# Patient Record
Sex: FEMALE | Race: WHITE | NOT HISPANIC OR LATINO | Employment: OTHER | URBAN - METROPOLITAN AREA
[De-identification: names, ages, dates, MRNs, and addresses within clinical notes are randomized per-mention and may not be internally consistent; named-entity substitution may affect disease eponyms.]

---

## 2019-06-19 ENCOUNTER — HOSPITAL ENCOUNTER (OUTPATIENT)
Facility: MEDICAL CENTER | Age: 84
End: 2019-06-19

## 2019-06-20 ENCOUNTER — HOSPITAL ENCOUNTER (INPATIENT)
Facility: MEDICAL CENTER | Age: 84
LOS: 29 days | DRG: 243 | End: 2019-07-19
Attending: FAMILY MEDICINE | Admitting: FAMILY MEDICINE
Payer: MEDICARE

## 2019-06-20 ENCOUNTER — HOSPITAL ENCOUNTER (OUTPATIENT)
Facility: MEDICAL CENTER | Age: 84
DRG: 243 | End: 2019-06-20
Admitting: FAMILY MEDICINE
Payer: MEDICARE

## 2019-06-20 ENCOUNTER — HOSPITAL ENCOUNTER (OUTPATIENT)
Dept: RADIOLOGY | Facility: MEDICAL CENTER | Age: 84
End: 2019-06-20

## 2019-06-20 VITALS — BODY MASS INDEX: 24.67 KG/M2 | HEIGHT: 62 IN | WEIGHT: 134.04 LBS

## 2019-06-20 DIAGNOSIS — R53.1 GENERALIZED WEAKNESS: ICD-10-CM

## 2019-06-20 DIAGNOSIS — Z95.0 S/P PLACEMENT OF CARDIAC PACEMAKER: ICD-10-CM

## 2019-06-20 DIAGNOSIS — R55 SYNCOPE, UNSPECIFIED SYNCOPE TYPE: ICD-10-CM

## 2019-06-20 PROBLEM — I44.30 AV BLOCK: Status: ACTIVE | Noted: 2019-06-20

## 2019-06-20 PROBLEM — E03.9 HYPOTHYROIDISM: Status: ACTIVE | Noted: 2019-06-20

## 2019-06-20 PROBLEM — N18.30 STAGE 3 CHRONIC KIDNEY DISEASE: Status: ACTIVE | Noted: 2019-06-20

## 2019-06-20 LAB
ALBUMIN SERPL BCP-MCNC: 3.4 G/DL (ref 3.2–4.9)
ALBUMIN/GLOB SERPL: 1.3 G/DL
ALP SERPL-CCNC: 70 U/L (ref 30–99)
ALT SERPL-CCNC: 15 U/L (ref 2–50)
ANION GAP SERPL CALC-SCNC: 9 MMOL/L (ref 0–11.9)
AST SERPL-CCNC: 20 U/L (ref 12–45)
BASOPHILS # BLD AUTO: 1.3 % (ref 0–1.8)
BASOPHILS # BLD: 0.08 K/UL (ref 0–0.12)
BILIRUB SERPL-MCNC: 0.6 MG/DL (ref 0.1–1.5)
BUN SERPL-MCNC: 18 MG/DL (ref 8–22)
CALCIUM SERPL-MCNC: 9.1 MG/DL (ref 8.5–10.5)
CHLORIDE SERPL-SCNC: 103 MMOL/L (ref 96–112)
CO2 SERPL-SCNC: 26 MMOL/L (ref 20–33)
CREAT SERPL-MCNC: 1.46 MG/DL (ref 0.5–1.4)
EOSINOPHIL # BLD AUTO: 0.06 K/UL (ref 0–0.51)
EOSINOPHIL NFR BLD: 1 % (ref 0–6.9)
ERYTHROCYTE [DISTWIDTH] IN BLOOD BY AUTOMATED COUNT: 45 FL (ref 35.9–50)
GLOBULIN SER CALC-MCNC: 2.7 G/DL (ref 1.9–3.5)
GLUCOSE SERPL-MCNC: 88 MG/DL (ref 65–99)
HCT VFR BLD AUTO: 39.6 % (ref 37–47)
HGB BLD-MCNC: 13.1 G/DL (ref 12–16)
IMM GRANULOCYTES # BLD AUTO: 0.02 K/UL (ref 0–0.11)
IMM GRANULOCYTES NFR BLD AUTO: 0.3 % (ref 0–0.9)
INR PPP: 1.01 (ref 0.87–1.13)
LYMPHOCYTES # BLD AUTO: 1.19 K/UL (ref 1–4.8)
LYMPHOCYTES NFR BLD: 19.2 % (ref 22–41)
MAGNESIUM SERPL-MCNC: 2 MG/DL (ref 1.5–2.5)
MCH RBC QN AUTO: 30.5 PG (ref 27–33)
MCHC RBC AUTO-ENTMCNC: 33.1 G/DL (ref 33.6–35)
MCV RBC AUTO: 92.1 FL (ref 81.4–97.8)
MONOCYTES # BLD AUTO: 0.83 K/UL (ref 0–0.85)
MONOCYTES NFR BLD AUTO: 13.4 % (ref 0–13.4)
NEUTROPHILS # BLD AUTO: 4.03 K/UL (ref 2–7.15)
NEUTROPHILS NFR BLD: 64.8 % (ref 44–72)
NRBC # BLD AUTO: 0 K/UL
NRBC BLD-RTO: 0 /100 WBC
PLATELET # BLD AUTO: 267 K/UL (ref 164–446)
PMV BLD AUTO: 9.2 FL (ref 9–12.9)
POTASSIUM SERPL-SCNC: 4 MMOL/L (ref 3.6–5.5)
PROT SERPL-MCNC: 6.1 G/DL (ref 6–8.2)
PROTHROMBIN TIME: 13.5 SEC (ref 12–14.6)
RBC # BLD AUTO: 4.3 M/UL (ref 4.2–5.4)
SODIUM SERPL-SCNC: 138 MMOL/L (ref 135–145)
TSH SERPL DL<=0.005 MIU/L-ACNC: 0.13 UIU/ML (ref 0.38–5.33)
WBC # BLD AUTO: 6.2 K/UL (ref 4.8–10.8)

## 2019-06-20 PROCEDURE — 85610 PROTHROMBIN TIME: CPT

## 2019-06-20 PROCEDURE — 85025 COMPLETE CBC W/AUTO DIFF WBC: CPT

## 2019-06-20 PROCEDURE — 80053 COMPREHEN METABOLIC PANEL: CPT

## 2019-06-20 PROCEDURE — 36415 COLL VENOUS BLD VENIPUNCTURE: CPT

## 2019-06-20 PROCEDURE — 99223 1ST HOSP IP/OBS HIGH 75: CPT | Mod: AI | Performed by: INTERNAL MEDICINE

## 2019-06-20 PROCEDURE — 84443 ASSAY THYROID STIM HORMONE: CPT

## 2019-06-20 PROCEDURE — 93005 ELECTROCARDIOGRAM TRACING: CPT | Performed by: NURSE PRACTITIONER

## 2019-06-20 PROCEDURE — 700105 HCHG RX REV CODE 258: Performed by: INTERNAL MEDICINE

## 2019-06-20 PROCEDURE — 83735 ASSAY OF MAGNESIUM: CPT

## 2019-06-20 PROCEDURE — 770020 HCHG ROOM/CARE - TELE (206)

## 2019-06-20 PROCEDURE — 99221 1ST HOSP IP/OBS SF/LOW 40: CPT | Performed by: INTERNAL MEDICINE

## 2019-06-20 RX ORDER — CITALOPRAM 20 MG/1
20 TABLET ORAL DAILY
Status: DISCONTINUED | OUTPATIENT
Start: 2019-06-21 | End: 2019-07-19 | Stop reason: HOSPADM

## 2019-06-20 RX ORDER — HEPARIN SODIUM 5000 [USP'U]/ML
5000 INJECTION, SOLUTION INTRAVENOUS; SUBCUTANEOUS EVERY 8 HOURS
Status: DISCONTINUED | OUTPATIENT
Start: 2019-06-21 | End: 2019-07-19 | Stop reason: HOSPADM

## 2019-06-20 RX ORDER — AMOXICILLIN 250 MG
2 CAPSULE ORAL 2 TIMES DAILY
Status: DISCONTINUED | OUTPATIENT
Start: 2019-06-20 | End: 2019-07-19 | Stop reason: HOSPADM

## 2019-06-20 RX ORDER — SODIUM CHLORIDE 9 MG/ML
1000 INJECTION, SOLUTION INTRAVENOUS CONTINUOUS
Status: DISCONTINUED | OUTPATIENT
Start: 2019-06-20 | End: 2019-06-24

## 2019-06-20 RX ORDER — POLYETHYLENE GLYCOL 3350 17 G/17G
1 POWDER, FOR SOLUTION ORAL
Status: DISCONTINUED | OUTPATIENT
Start: 2019-06-20 | End: 2019-07-19 | Stop reason: HOSPADM

## 2019-06-20 RX ORDER — ASPIRIN 81 MG/1
81 TABLET, CHEWABLE ORAL DAILY
Status: DISCONTINUED | OUTPATIENT
Start: 2019-06-21 | End: 2019-07-19 | Stop reason: HOSPADM

## 2019-06-20 RX ORDER — RISPERIDONE 0.5 MG/1
0.25 TABLET ORAL 2 TIMES DAILY
Status: DISCONTINUED | OUTPATIENT
Start: 2019-06-20 | End: 2019-06-20

## 2019-06-20 RX ORDER — TRAZODONE HYDROCHLORIDE 50 MG/1
50 TABLET ORAL NIGHTLY
Status: ON HOLD | COMMUNITY
End: 2019-07-19

## 2019-06-20 RX ORDER — RISPERIDONE 0.5 MG/1
0.25 TABLET ORAL EVERY EVENING
Status: DISCONTINUED | OUTPATIENT
Start: 2019-06-21 | End: 2019-06-23

## 2019-06-20 RX ORDER — ACETAMINOPHEN 325 MG/1
650 TABLET ORAL EVERY 6 HOURS PRN
Status: DISCONTINUED | OUTPATIENT
Start: 2019-06-20 | End: 2019-07-19 | Stop reason: HOSPADM

## 2019-06-20 RX ORDER — LEVOTHYROXINE SODIUM 175 UG/1
175 TABLET ORAL
Status: ON HOLD | COMMUNITY
End: 2019-07-19

## 2019-06-20 RX ORDER — CITALOPRAM 20 MG/1
20 TABLET ORAL DAILY
COMMUNITY
End: 2019-07-30

## 2019-06-20 RX ORDER — BISACODYL 10 MG
10 SUPPOSITORY, RECTAL RECTAL
Status: DISCONTINUED | OUTPATIENT
Start: 2019-06-20 | End: 2019-07-19 | Stop reason: HOSPADM

## 2019-06-20 RX ORDER — TRAZODONE HYDROCHLORIDE 50 MG/1
50 TABLET ORAL NIGHTLY
Status: DISCONTINUED | OUTPATIENT
Start: 2019-06-20 | End: 2019-06-20

## 2019-06-20 RX ORDER — TRAZODONE HYDROCHLORIDE 50 MG/1
50 TABLET ORAL NIGHTLY PRN
Status: DISCONTINUED | OUTPATIENT
Start: 2019-06-20 | End: 2019-07-19 | Stop reason: HOSPADM

## 2019-06-20 RX ORDER — ASPIRIN 81 MG/1
81 TABLET, CHEWABLE ORAL DAILY
COMMUNITY

## 2019-06-20 RX ORDER — RISPERIDONE 0.5 MG/1
0.25 TABLET ORAL DAILY
Status: ON HOLD | COMMUNITY
End: 2019-07-19

## 2019-06-20 RX ADMIN — SODIUM CHLORIDE 1000 ML: 9 INJECTION, SOLUTION INTRAVENOUS at 23:46

## 2019-06-20 ASSESSMENT — COPD QUESTIONNAIRES
HAVE YOU SMOKED AT LEAST 100 CIGARETTES IN YOUR ENTIRE LIFE: YES
COPD SCREENING SCORE: 7
DO YOU EVER COUGH UP ANY MUCUS OR PHLEGM?: YES, A FEW DAYS A WEEK OR MONTH
IN THE PAST 12 MONTHS DO YOU DO LESS THAN YOU USED TO BECAUSE OF YOUR BREATHING PROBLEMS: AGREE
DURING THE PAST 4 WEEKS HOW MUCH DID YOU FEEL SHORT OF BREATH: SOME OF THE TIME

## 2019-06-20 ASSESSMENT — COGNITIVE AND FUNCTIONAL STATUS - GENERAL
STANDING UP FROM CHAIR USING ARMS: A LITTLE
MOBILITY SCORE: 17
SUGGESTED CMS G CODE MODIFIER DAILY ACTIVITY: CK
DRESSING REGULAR LOWER BODY CLOTHING: A LITTLE
HELP NEEDED FOR BATHING: A LITTLE
MOVING TO AND FROM BED TO CHAIR: A LITTLE
DAILY ACTIVITIY SCORE: 18
PERSONAL GROOMING: A LITTLE
TOILETING: A LITTLE
EATING MEALS: A LITTLE
DRESSING REGULAR UPPER BODY CLOTHING: A LITTLE
WALKING IN HOSPITAL ROOM: A LITTLE
CLIMB 3 TO 5 STEPS WITH RAILING: A LITTLE
TURNING FROM BACK TO SIDE WHILE IN FLAT BAD: A LITTLE
MOVING FROM LYING ON BACK TO SITTING ON SIDE OF FLAT BED: A LOT
SUGGESTED CMS G CODE MODIFIER MOBILITY: CK

## 2019-06-20 ASSESSMENT — LIFESTYLE VARIABLES
ALCOHOL_USE: YES
ON A TYPICAL DAY WHEN YOU DRINK ALCOHOL HOW MANY DRINKS DO YOU HAVE: 0
AVERAGE NUMBER OF DAYS PER WEEK YOU HAVE A DRINK CONTAINING ALCOHOL: 0
HOW MANY TIMES IN THE PAST YEAR HAVE YOU HAD 5 OR MORE DRINKS IN A DAY: 0
EVER_SMOKED: YES
EVER FELT BAD OR GUILTY ABOUT YOUR DRINKING: NO
TOTAL SCORE: 0
TOTAL SCORE: 0
HAVE PEOPLE ANNOYED YOU BY CRITICIZING YOUR DRINKING: NO
TOTAL SCORE: 0
HAVE YOU EVER FELT YOU SHOULD CUT DOWN ON YOUR DRINKING: NO
EVER HAD A DRINK FIRST THING IN THE MORNING TO STEADY YOUR NERVES TO GET RID OF A HANGOVER: NO
CONSUMPTION TOTAL: NEGATIVE

## 2019-06-20 ASSESSMENT — PATIENT HEALTH QUESTIONNAIRE - PHQ9
2. FEELING DOWN, DEPRESSED, IRRITABLE, OR HOPELESS: NOT AT ALL
SUM OF ALL RESPONSES TO PHQ9 QUESTIONS 1 AND 2: 0
1. LITTLE INTEREST OR PLEASURE IN DOING THINGS: NOT AT ALL

## 2019-06-20 NOTE — CONSULTS
DATE OF SERVICE:  06/20/2019    CHIEF COMPLAINT:  Syncope.    HISTORY OF PRESENT ILLNESS:  The patient is a very pleasant 89-year-old female   seen in consultation at the request of Dr. Scott, hospitalist at [facility] ([facility]  The patient lives in extended care facility and yesterday   was found on the floor.  She had a syncopal event and suffered a small scalp   laceration, which was sutured in the ER.  On the monitor, she has had   persistent 2:1 heart block with effective ventricular rate of 40 beats per   minute.  The QRS is narrow.  It is not clear if this is Mobitz 1 or Mobitz II   based on the tracings.    The patient is extremely hard of hearing.  History is obtained primarily from   her granddaughter who is by her side.  The patient has never had a stroke.    She has chronic kidney disease.  No history of heart attack or chest pain.  No   history of breathing difficulties.  She has had periodic edema.  The   granddaughter states her quality of life is quite good.  They would like to   pursue placing a pacemaker.  This was also discussed by phone between the   granddaughter and the patient's son, who is the patient's power of .    The patient is a DNR and this was discussed with family and reiterated.    ALLERGIES:  SULFONAMIDES.    ILLNESSES:  History of hypothyroidism, history of chronic kidney disease.    FAMILY HISTORY:  Not obtainable due to her deafness.  Interestingly,   granddaughter has a history of alpha 1 antitrypsin deficiency.    SOCIAL HISTORY:  Patient is a nonsmoker and lives in an extended care   facility.    MEDICATIONS ON ADMISSION:  Trazodone; Aldactone daily; Risperdal, dose   unknown; Synthroid, dose unknown; furosemide, dose unknown; Advair inhaler;   amlodipine, and aspirin.    REVIEW OF SYSTEMS:  Not obtainable because of her deafness.    PHYSICAL EXAMINATION:  GENERAL:  Reveals a pleasant and alert nonagenarian.  VITAL SIGNS:  On the monitor, she is in sinus rhythm  at 80 with 2:1 heart   block.  HEENT:  Pupils are equal, gaze conjugate, sclerae nonicteric.  NECK:  There is no JVD.  I do not hear any carotid bruit.  LUNGS:  Clear to auscultation.  BACK:  Without significant deformity.  HEART:  Regular rate and rhythm, with slow rate and I/VI systolic murmur.  ABDOMEN:  Mildly protuberant and soft to palpation.  No masses palpable.  EXTREMITIES:  There is trace edema.  Posterior tibial pulses are not palpable.  SKIN:  Warm and dry.    PERTINENT LABORATORY:  Hemoglobin is 11.8, hematocrit is 36%, and platelet   count is 248,000.  BUN and creatinine are 17 and 1.4, respectively.  GFR is   32.  Calcium is 8.3.  Potassium is 4.1.  TSH is 0.14, free T4 is 2.06.    IMPRESSION:  1. Syncope.  The patient had episode of syncope and has had persistent 2:1   heart block with an effective ventricular response rate of 40 beats per   minute.  This may be a Mobitz type 1, but difficult to tell because of a 2:1   conduction.  In any event, she has symptomatic bradycardia and has a class 1   indication for a permanent pacemaker.  The situation was discussed with the   granddaughter who, as noted above previously, discussed the situation with her   uncle (the patient's son), who has power of  and he concurs that the   pacemaker should be placed.  She will therefore be transferred to [facility] today   for placement of permanent pacemaker.  2. History of chronic kidney disease, followed by Dr. Capps in [place].  3. History of hypothyroidism, which may be overtreated based on lab.  4. Severe deafness.         ____________________________________     ANA TORRES MD    RPS / NTS    DD:  06/20/2019 09:43:17  DT:  06/20/2019 10:43:05    D#:  [id number]  Job#:  [id number]

## 2019-06-20 NOTE — PROGRESS NOTES
Transfer from [place], admitted for syncope, found to have second-degree AV block, seen by cardiology - Judy, recommended transfer for pacemaker placement, cardiology - Brien aware of transfer.    written material

## 2019-06-20 NOTE — PROGRESS NOTES
Direct admit from [facility], Dr. Scott, [phone].  Accepted by Dr. Champion for Type 2 Second Degree AVB.  Dr. Robles of Cardiology to consult.  Pt coming by ground EMS.

## 2019-06-20 NOTE — PROGRESS NOTES
Medical records received from [facility]:  H&P, ER report, Labs, EKG, Radiology reports, and Demographics.  Scanned into Media tab.

## 2019-06-21 ENCOUNTER — ANESTHESIA (OUTPATIENT)
Dept: CARDIOLOGY | Facility: MEDICAL CENTER | Age: 84
DRG: 243 | End: 2019-06-21
Payer: MEDICARE

## 2019-06-21 ENCOUNTER — APPOINTMENT (OUTPATIENT)
Dept: CARDIOLOGY | Facility: MEDICAL CENTER | Age: 84
DRG: 243 | End: 2019-06-21
Attending: NURSE PRACTITIONER
Payer: MEDICARE

## 2019-06-21 ENCOUNTER — ANESTHESIA EVENT (OUTPATIENT)
Dept: CARDIOLOGY | Facility: MEDICAL CENTER | Age: 84
DRG: 243 | End: 2019-06-21
Payer: MEDICARE

## 2019-06-21 ENCOUNTER — APPOINTMENT (OUTPATIENT)
Dept: RADIOLOGY | Facility: MEDICAL CENTER | Age: 84
DRG: 243 | End: 2019-06-21
Attending: INTERNAL MEDICINE
Payer: MEDICARE

## 2019-06-21 PROBLEM — D64.9 NORMOCYTIC ANEMIA: Status: ACTIVE | Noted: 2019-06-21

## 2019-06-21 LAB
ALBUMIN SERPL BCP-MCNC: 2.9 G/DL (ref 3.2–4.9)
ALBUMIN/GLOB SERPL: 1.3 G/DL
ALP SERPL-CCNC: 59 U/L (ref 30–99)
ALT SERPL-CCNC: 13 U/L (ref 2–50)
ANION GAP SERPL CALC-SCNC: 6 MMOL/L (ref 0–11.9)
AST SERPL-CCNC: 17 U/L (ref 12–45)
BASOPHILS # BLD AUTO: 1.1 % (ref 0–1.8)
BASOPHILS # BLD: 0.07 K/UL (ref 0–0.12)
BILIRUB SERPL-MCNC: 0.5 MG/DL (ref 0.1–1.5)
BUN SERPL-MCNC: 17 MG/DL (ref 8–22)
CALCIUM SERPL-MCNC: 8.5 MG/DL (ref 8.5–10.5)
CHLORIDE SERPL-SCNC: 105 MMOL/L (ref 96–112)
CO2 SERPL-SCNC: 27 MMOL/L (ref 20–33)
CREAT SERPL-MCNC: 1.23 MG/DL (ref 0.5–1.4)
EKG IMPRESSION: NORMAL
EKG IMPRESSION: NORMAL
EOSINOPHIL # BLD AUTO: 0.05 K/UL (ref 0–0.51)
EOSINOPHIL NFR BLD: 0.8 % (ref 0–6.9)
ERYTHROCYTE [DISTWIDTH] IN BLOOD BY AUTOMATED COUNT: 45.3 FL (ref 35.9–50)
GLOBULIN SER CALC-MCNC: 2.2 G/DL (ref 1.9–3.5)
GLUCOSE SERPL-MCNC: 86 MG/DL (ref 65–99)
HCT VFR BLD AUTO: 36.8 % (ref 37–47)
HGB BLD-MCNC: 11.9 G/DL (ref 12–16)
IMM GRANULOCYTES # BLD AUTO: 0.03 K/UL (ref 0–0.11)
IMM GRANULOCYTES NFR BLD AUTO: 0.5 % (ref 0–0.9)
LV EJECT FRACT  99904: 65
LV EJECT FRACT MOD 2C 99903: 73.62
LV EJECT FRACT MOD 4C 99902: 74.07
LV EJECT FRACT MOD BP 99901: 73.68
LYMPHOCYTES # BLD AUTO: 1.1 K/UL (ref 1–4.8)
LYMPHOCYTES NFR BLD: 17.1 % (ref 22–41)
MAGNESIUM SERPL-MCNC: 2.2 MG/DL (ref 1.5–2.5)
MCH RBC QN AUTO: 30.4 PG (ref 27–33)
MCHC RBC AUTO-ENTMCNC: 32.3 G/DL (ref 33.6–35)
MCV RBC AUTO: 93.9 FL (ref 81.4–97.8)
MONOCYTES # BLD AUTO: 0.77 K/UL (ref 0–0.85)
MONOCYTES NFR BLD AUTO: 12 % (ref 0–13.4)
NEUTROPHILS # BLD AUTO: 4.4 K/UL (ref 2–7.15)
NEUTROPHILS NFR BLD: 68.5 % (ref 44–72)
NRBC # BLD AUTO: 0 K/UL
NRBC BLD-RTO: 0 /100 WBC
PLATELET # BLD AUTO: 252 K/UL (ref 164–446)
PMV BLD AUTO: 9.4 FL (ref 9–12.9)
POTASSIUM SERPL-SCNC: 3.9 MMOL/L (ref 3.6–5.5)
PROT SERPL-MCNC: 5.1 G/DL (ref 6–8.2)
RBC # BLD AUTO: 3.92 M/UL (ref 4.2–5.4)
SODIUM SERPL-SCNC: 138 MMOL/L (ref 135–145)
T4 FREE SERPL-MCNC: 2.1 NG/DL (ref 0.53–1.43)
WBC # BLD AUTO: 6.4 K/UL (ref 4.8–10.8)

## 2019-06-21 PROCEDURE — 770020 HCHG ROOM/CARE - TELE (206)

## 2019-06-21 PROCEDURE — 93306 TTE W/DOPPLER COMPLETE: CPT | Mod: 26 | Performed by: INTERNAL MEDICINE

## 2019-06-21 PROCEDURE — 93005 ELECTROCARDIOGRAM TRACING: CPT | Performed by: INTERNAL MEDICINE

## 2019-06-21 PROCEDURE — 700101 HCHG RX REV CODE 250: Performed by: ANESTHESIOLOGY

## 2019-06-21 PROCEDURE — 0JH604Z INSERTION OF PACEMAKER, SINGLE CHAMBER INTO CHEST SUBCUTANEOUS TISSUE AND FASCIA, OPEN APPROACH: ICD-10-PCS | Performed by: INTERNAL MEDICINE

## 2019-06-21 PROCEDURE — 160002 HCHG RECOVERY MINUTES (STAT)

## 2019-06-21 PROCEDURE — 84439 ASSAY OF FREE THYROXINE: CPT

## 2019-06-21 PROCEDURE — 93010 ELECTROCARDIOGRAM REPORT: CPT | Mod: 77 | Performed by: INTERNAL MEDICINE

## 2019-06-21 PROCEDURE — 71045 X-RAY EXAM CHEST 1 VIEW: CPT

## 2019-06-21 PROCEDURE — 85025 COMPLETE CBC W/AUTO DIFF WBC: CPT

## 2019-06-21 PROCEDURE — 36415 COLL VENOUS BLD VENIPUNCTURE: CPT

## 2019-06-21 PROCEDURE — 93010 ELECTROCARDIOGRAM REPORT: CPT | Performed by: INTERNAL MEDICINE

## 2019-06-21 PROCEDURE — 700111 HCHG RX REV CODE 636 W/ 250 OVERRIDE (IP): Performed by: ANESTHESIOLOGY

## 2019-06-21 PROCEDURE — 02HK3JZ INSERTION OF PACEMAKER LEAD INTO RIGHT VENTRICLE, PERCUTANEOUS APPROACH: ICD-10-PCS | Performed by: INTERNAL MEDICINE

## 2019-06-21 PROCEDURE — 700102 HCHG RX REV CODE 250 W/ 637 OVERRIDE(OP): Performed by: INTERNAL MEDICINE

## 2019-06-21 PROCEDURE — B214YZZ FLUOROSCOPY OF RIGHT HEART USING OTHER CONTRAST: ICD-10-PCS | Performed by: INTERNAL MEDICINE

## 2019-06-21 PROCEDURE — 700105 HCHG RX REV CODE 258: Performed by: ANESTHESIOLOGY

## 2019-06-21 PROCEDURE — 305387 CL-PERMANENT PACEMAKER INSERTION

## 2019-06-21 PROCEDURE — 700111 HCHG RX REV CODE 636 W/ 250 OVERRIDE (IP): Performed by: INTERNAL MEDICINE

## 2019-06-21 PROCEDURE — 33207 INSERT HEART PM VENTRICULAR: CPT | Mod: KX | Performed by: INTERNAL MEDICINE

## 2019-06-21 PROCEDURE — 99233 SBSQ HOSP IP/OBS HIGH 50: CPT | Performed by: INTERNAL MEDICINE

## 2019-06-21 PROCEDURE — 83735 ASSAY OF MAGNESIUM: CPT

## 2019-06-21 PROCEDURE — 80053 COMPREHEN METABOLIC PANEL: CPT

## 2019-06-21 PROCEDURE — A9270 NON-COVERED ITEM OR SERVICE: HCPCS | Performed by: INTERNAL MEDICINE

## 2019-06-21 PROCEDURE — 93306 TTE W/DOPPLER COMPLETE: CPT

## 2019-06-21 PROCEDURE — 99232 SBSQ HOSP IP/OBS MODERATE 35: CPT | Performed by: INTERNAL MEDICINE

## 2019-06-21 RX ORDER — HALOPERIDOL 5 MG/ML
1 INJECTION INTRAMUSCULAR
Status: DISCONTINUED | OUTPATIENT
Start: 2019-06-21 | End: 2019-06-21 | Stop reason: HOSPADM

## 2019-06-21 RX ORDER — LEVOTHYROXINE SODIUM 0.07 MG/1
150 TABLET ORAL
Status: DISCONTINUED | OUTPATIENT
Start: 2019-06-22 | End: 2019-07-19 | Stop reason: HOSPADM

## 2019-06-21 RX ORDER — LIDOCAINE HYDROCHLORIDE 20 MG/ML
INJECTION, SOLUTION INFILTRATION; PERINEURAL
Status: DISPENSED
Start: 2019-06-21 | End: 2019-06-22

## 2019-06-21 RX ORDER — DIPHENHYDRAMINE HYDROCHLORIDE 50 MG/ML
12.5 INJECTION INTRAMUSCULAR; INTRAVENOUS
Status: DISCONTINUED | OUTPATIENT
Start: 2019-06-21 | End: 2019-06-21 | Stop reason: HOSPADM

## 2019-06-21 RX ORDER — SODIUM CHLORIDE, SODIUM LACTATE, POTASSIUM CHLORIDE, CALCIUM CHLORIDE 600; 310; 30; 20 MG/100ML; MG/100ML; MG/100ML; MG/100ML
INJECTION, SOLUTION INTRAVENOUS CONTINUOUS
Status: DISCONTINUED | OUTPATIENT
Start: 2019-06-21 | End: 2019-06-24

## 2019-06-21 RX ORDER — LABETALOL HYDROCHLORIDE 5 MG/ML
5 INJECTION, SOLUTION INTRAVENOUS
Status: DISCONTINUED | OUTPATIENT
Start: 2019-06-21 | End: 2019-06-21 | Stop reason: HOSPADM

## 2019-06-21 RX ORDER — SODIUM CHLORIDE, SODIUM LACTATE, POTASSIUM CHLORIDE, CALCIUM CHLORIDE 600; 310; 30; 20 MG/100ML; MG/100ML; MG/100ML; MG/100ML
INJECTION, SOLUTION INTRAVENOUS
Status: DISCONTINUED | OUTPATIENT
Start: 2019-06-21 | End: 2019-06-21 | Stop reason: SURG

## 2019-06-21 RX ORDER — VANCOMYCIN HYDROCHLORIDE 1 G/20ML
INJECTION, POWDER, LYOPHILIZED, FOR SOLUTION INTRAVENOUS
Status: DISPENSED
Start: 2019-06-21 | End: 2019-06-22

## 2019-06-21 RX ORDER — ONDANSETRON 2 MG/ML
INJECTION INTRAMUSCULAR; INTRAVENOUS PRN
Status: DISCONTINUED | OUTPATIENT
Start: 2019-06-21 | End: 2019-06-21 | Stop reason: SURG

## 2019-06-21 RX ORDER — HYDRALAZINE HYDROCHLORIDE 20 MG/ML
5 INJECTION INTRAMUSCULAR; INTRAVENOUS
Status: DISCONTINUED | OUTPATIENT
Start: 2019-06-21 | End: 2019-06-21 | Stop reason: HOSPADM

## 2019-06-21 RX ADMIN — SENNOSIDES AND DOCUSATE SODIUM 2 TABLET: 8.6; 5 TABLET ORAL at 06:15

## 2019-06-21 RX ADMIN — SENNOSIDES AND DOCUSATE SODIUM 2 TABLET: 8.6; 5 TABLET ORAL at 19:23

## 2019-06-21 RX ADMIN — ONDANSETRON 4 MG: 2 INJECTION INTRAMUSCULAR; INTRAVENOUS at 16:31

## 2019-06-21 RX ADMIN — ROCURONIUM BROMIDE 30 MG: 10 INJECTION, SOLUTION INTRAVENOUS at 16:20

## 2019-06-21 RX ADMIN — SODIUM CHLORIDE, POTASSIUM CHLORIDE, SODIUM LACTATE AND CALCIUM CHLORIDE: 600; 310; 30; 20 INJECTION, SOLUTION INTRAVENOUS at 16:02

## 2019-06-21 RX ADMIN — CITALOPRAM HYDROBROMIDE 20 MG: 20 TABLET ORAL at 06:15

## 2019-06-21 RX ADMIN — RISPERIDONE 0.25 MG: 0.5 TABLET ORAL at 19:23

## 2019-06-21 RX ADMIN — SUGAMMADEX 120 MG: 100 INJECTION, SOLUTION INTRAVENOUS at 16:55

## 2019-06-21 RX ADMIN — TRAZODONE HYDROCHLORIDE 50 MG: 50 TABLET ORAL at 21:34

## 2019-06-21 RX ADMIN — ASPIRIN 81 MG 81 MG: 81 TABLET ORAL at 06:14

## 2019-06-21 RX ADMIN — VANCOMYCIN HYDROCHLORIDE 1 G: 1 INJECTION, POWDER, LYOPHILIZED, FOR SOLUTION INTRAVENOUS at 16:02

## 2019-06-21 RX ADMIN — FENTANYL CITRATE 50 MCG: 50 INJECTION, SOLUTION INTRAMUSCULAR; INTRAVENOUS at 16:20

## 2019-06-21 RX ADMIN — PROPOFOL 80 MG: 10 INJECTION, EMULSION INTRAVENOUS at 16:20

## 2019-06-21 RX ADMIN — LEVOTHYROXINE SODIUM 175 MCG: 125 TABLET ORAL at 06:14

## 2019-06-21 RX ADMIN — HEPARIN SODIUM 5000 UNITS: 5000 INJECTION, SOLUTION INTRAVENOUS; SUBCUTANEOUS at 21:34

## 2019-06-21 ASSESSMENT — ENCOUNTER SYMPTOMS
HEADACHES: 0
MYALGIAS: 0
DIZZINESS: 0
SHORTNESS OF BREATH: 0
PALPITATIONS: 0
CHEST TIGHTNESS: 0

## 2019-06-21 ASSESSMENT — PAIN SCALES - GENERAL: PAIN_LEVEL: 0

## 2019-06-21 NOTE — ANESTHESIA PREPROCEDURE EVALUATION
Relevant Problems   (+) Hypothyroidism       Physical Exam    Airway   Mallampati: II  TM distance: >3 FB  Neck ROM: full       Cardiovascular - normal exam  Rhythm: regular  Rate: normal  (-) murmur     Dental - normal exam         Pulmonary - normal exam  Breath sounds clear to auscultation     Abdominal    Neurological - normal exam                 Anesthesia Plan    ASA 3   ASA physical status 3 criteria: implanted pacemaker    Plan - general       Airway plan will be LMA        Induction: intravenous    Postoperative Plan: Postoperative administration of opioids is intended.    Pertinent diagnostic labs and testing reviewed    Informed Consent:    Anesthetic plan and risks discussed with patient.    Use of blood products discussed with: patient whom consented to blood products.

## 2019-06-21 NOTE — PROGRESS NOTES
Hospital Medicine Daily Progress Note    Date of Service  6/21/2019    Chief Complaint  89 y.o. 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 Cardiology consulted. Echocardiogram unremarkable. Discussion was done with her son who is power of  and agreeable for pacemaker placement.        Interval Problem Update  No event. Pt sleepy. Hard of hearing. She denies pain when I asked for pain.     Consultants/Specialty  Cardiology     Code Status  Full code     Disposition  inpatient     Review of Systems  Review of Systems   Unable to perform ROS: Dementia        Physical Exam  Temp:  [36.2 °C (97.1 °F)-36.8 °C (98.2 °F)] 36.3 °C (97.4 °F)  Pulse:  [39-53] 39  Resp:  [16-18] 16  BP: (124-154)/(46-66) 146/59  SpO2:  [90 %-98 %] 98 %    Physical Exam   Constitutional: She is oriented to person, place, and time.   Frail, hard of hearing.    HENT:   Head: Normocephalic and atraumatic.   Neck: Normal range of motion.   Cardiovascular: Normal rate and regular rhythm.    Murmur heard.   Systolic murmur is present with a grade of 3/6   Pulmonary/Chest: Effort normal. No respiratory distress. She has no wheezes. She has no rales.   Abdominal: Soft. Bowel sounds are normal. She exhibits no distension. There is no tenderness. There is no rebound.   Musculoskeletal: Normal range of motion. She exhibits no edema.   Lymphadenopathy:     She has no cervical adenopathy.   Neurological: She is alert and oriented to person, place, and time.   Hard of hearing. Cranial nerve grossly intact    Skin: Skin is warm.   Multiple bruises       Fluids    Intake/Output Summary (Last 24 hours) at 06/21/19 1503  Last data filed at 06/21/19 1300   Gross per 24 hour   Intake                0 ml   Output                 0 ml   Net                0 ml       Laboratory  Recent Labs      06/20/19   1658  06/21/19   0221   WBC  6.2  6.4   RBC  4.30  3.92*   HEMOGLOBIN  13.1  11.9*   HEMATOCRIT  39.6  36.8*   MCV  92.1  93.9   MCH  30.5  30.4   MCHC  33.1*  32.3*   RDW  45.0  45.3   PLATELETCT  267  252   MPV  9.2  9.4     Recent Labs      06/20/19   1711  06/21/19   0221   SODIUM  138  138   POTASSIUM  4.0  3.9   CHLORIDE  103  105   CO2  26  27   GLUCOSE  88  86   BUN  18  17   CREATININE  1.46*  1.23   CALCIUM  9.1  8.5     Recent Labs      06/20/19   1658   INR  1.01               Imaging  EC-ECHOCARDIOGRAM COMPLETE W/O CONT   Final Result      DX-CHEST-PORTABLE (1 VIEW)   Final Result      No acute cardiac or pulmonary abnormality is noted.      CL-PERMANENT PACEMAKER INSERTION    (Results Pending)   CL-PERMANENT PACEMAKER INSERTION    (Results Pending)   CL-PERMANENT PACEMAKER INSERTION    (Results Pending)        Assessment/Plan  AV block   Assessment & Plan    She will have pacemaker placement today   Echo unremarkable   Syncope on admission        Normocytic anemia   Assessment & Plan    No acute bleeding  Iron panel/b12/ferritin/folic acid to follow      Syncopal episodes   Assessment & Plan    Seems to cardiogenic reason. Continue tele monitoring at this time      Stage 3 chronic kidney disease (HCC)   Assessment & Plan    She has history of chronic kidney disease.  Avoid nephrotoxins per  Medication doses as per renal function.  Continue to monitor       Hypothyroidism   Assessment & Plan    TSH low/free t4 elevated  I did decrease levothyroxine 175---> 150 mcg daily. High risk of over correction of her thyroid on her age   Continue to monitor           VTE prophylaxis: heparin

## 2019-06-21 NOTE — RESPIRATORY CARE
COPD EDUCATION by COPD CLINICAL EDUCATOR  6/21/2019 at 7:59 AM by Rissa Ellis     Patient's chart reviewed by COPD education team. Patient has dementia, therefore she is not able to complete the COPD program.

## 2019-06-21 NOTE — H&P
Hospital Medicine History & Physical Note    Date of Service  6/20/2019    Primary Care Physician  No primary care provider on file.    Consultants  Cardiology    Code Status  DNR/DNI (per patient's granddaughter)    Chief Complaint  Transfer from outside facility for evaluation of 2: 1 AV block    History of Presenting Illness    I obtained below history from chart review and talking to RN at the bedside.  Patient is unable to provide history at this time.    89 y.o. female with past medical history of hypothyroidism hypertension and dementia who presented to the hospital 6/20/2019 as a transfer from outside facility for evaluation of 2:1 AV block.  Patient currently living in extended care facility and she found to have a fall yesterday.  For her syncopal episode she presented to the outside facility and on monitor she found to have persistent 2:1 AV block.  I evaluated and examined this patient at bedside she denies any acute symptoms and she is very hard of hearing.      I reviewed outside medical record which include CT cervical spine on June 19, 2019 which showed moderate cervical spondylosis without evidence of fracture.    Her CT head without contrast on June 19,019 did not show any acute intracranial abnormality at the outside facility.    History is limited as patient has history of dementia and she is unable to provide information at this time.      Review of Systems  Review of Systems   Unable to perform ROS: Dementia       Past Medical History   has a past medical history of Arrhythmia; COPD (chronic obstructive pulmonary disease) (HCC); Dementia; Disorder of thyroid; Emphysema lung (HCC); and Glaucoma.    Surgical History   has a past surgical history that includes hysterectomy laparoscopy; appendectomy; general lung surgery (1972); thyroidectomy; and appendectomy.     Family History  family history includes Diabetes in her daughter, mother, son, and son; Hypertension in her daughter, son, and son;  Lung Disease in her father; Paranoid behavior in her son.     Social History   reports that she quit smoking about 47 years ago. She started smoking about 69 years ago. She has never used smokeless tobacco. She reports that she drinks alcohol. She reports that she does not use drugs.    Allergies  Allergies   Allergen Reactions   • Sulfa Drugs Itching       Medications  Prior to Admission Medications   Prescriptions Last Dose Informant Patient Reported? Taking?   aspirin (ASA) 81 MG Chew Tab chewable tablet 6/20/2019 at 0834  Yes Yes   Sig: Take 81 mg by mouth every day.   citalopram (CELEXA) 20 MG Tab 6/20/2019 at 0834  Yes Yes   Sig: Take 20 mg by mouth every day.   enoxaparin (LOVENOX) 30 MG/0.3ML Solution inj 6/19/2019 at 2031  Yes Yes   Sig: Inject 1 Syringe as instructed every 24 hours.   levothyroxine (SYNTHROID) 175 MCG Tab 6/20/2019 at 0605  Yes Yes   Sig: Take 175 mcg by mouth Every morning on an empty stomach.   risperiDONE (RISPERDAL) 0.5 MG Tab 6/20/2019 at 0834  Yes Yes   Sig: Take 0.25 mg by mouth every day.   traZODone (DESYREL) 50 MG Tab 6/19/2019 at 2031  Yes Yes   Sig: Take 50 mg by mouth every evening.      Facility-Administered Medications: None       Physical Exam  Temp:  [36.8 °C (98.2 °F)] 36.8 °C (98.2 °F)  Pulse:  [48] 48  Resp:  [18] 18  BP: (154)/(54) 154/54  SpO2:  [90 %] 90 %    Physical Exam   Constitutional:   She is laying in bed without any acute distress.   HENT:   Head: Normocephalic and atraumatic.   Eyes: Pupils are equal, round, and reactive to light. Right eye exhibits no discharge. Left eye exhibits no discharge.   Neck: Normal range of motion. Neck supple.   Cardiovascular: Regular rhythm and normal heart sounds.  Exam reveals no friction rub.    No murmur heard.  Bradycardia   Pulmonary/Chest: Effort normal and breath sounds normal. No respiratory distress. She has no wheezes. She has no rales.   Abdominal: Soft. Bowel sounds are normal. She exhibits no distension. There is  no tenderness. There is no rebound.   Musculoskeletal: Normal range of motion. She exhibits no edema or tenderness.   Neurological: She is alert. No cranial nerve deficit.   Skin: Skin is warm and dry. No rash noted. She is not diaphoretic. No erythema.   She has multiple bruises on her extremities  Laceration on her right side of scalp       Laboratory:  Recent Labs      06/20/19   1658   WBC  6.2   RBC  4.30   HEMOGLOBIN  13.1   HEMATOCRIT  39.6   MCV  92.1   MCH  30.5   MCHC  33.1*   RDW  45.0   PLATELETCT  267   MPV  9.2     Recent Labs      06/20/19   1711   SODIUM  138   POTASSIUM  4.0   CHLORIDE  103   CO2  26   GLUCOSE  88   BUN  18   CREATININE  1.46*   CALCIUM  9.1     Recent Labs      06/20/19   1711   ALTSGPT  15   ASTSGOT  20   ALKPHOSPHAT  70   TBILIRUBIN  0.6   GLUCOSE  88     Recent Labs      06/20/19   1658   INR  1.01             No results for input(s): TROPONINI in the last 72 hours.    Urinalysis:    No results found     Imaging:  EC-ECHOCARDIOGRAM COMPLETE W/O CONT    (Results Pending)         Assessment/Plan:  I anticipate this patient will require at least two midnights for appropriate medical management, necessitating inpatient admission.    AV block   Assessment & Plan    Patient found to have syncopal episode and on monitoring outside facility she found to have AV block.  She transferred to [facility] for further medical management.  Admitted to telemetry for close monitoring.  Cardiology consulted and made recommendations per  I made her n.p.o. from midnight for possible pacemaker placement.  Appreciate cardiology recommendations.  Echocardiogram ordered  Continue to monitor electrolytes and replace as needed.       Syncopal episodes   Assessment & Plan    She presented to the outside facility with syncopal episode.  Syncopal episode could be related to AV block  Admitted to telemetry for close monitoring.  Cardiology consulted.  Appreciate recommendations.     Stage 3 chronic kidney disease  (HCC)   Assessment & Plan    She has history of chronic kidney disease.  Avoid nephrotoxins per  Medication doses as per renal function.  Continue to monitor       Hypothyroidism   Assessment & Plan    As outpatient.  Continue levothyroxine at this time.  I ordered free T4 for tomorrow morning to evaluate further for her hypothyroidism before make any changes in her outpatient medications.         VTE prophylaxis: Heparin

## 2019-06-21 NOTE — ASSESSMENT & PLAN NOTE
-TSH low/free t4 elevated  -synthroid decreased from 175 to 150 on 6/22  -TSH in 4-6 weeks, outpatient follow up

## 2019-06-21 NOTE — PROGRESS NOTES
Patient NPO for permanent pacemaker for Second degree HB Type I and Type II with GLF . She did strike her head. CT in [place] unremarkable.  She has dementia and is very [place]. Lives in ECF in [place].  I spoke with nurses they will get consent from POA this AM.  Labs reviewed.  Heparin has been held this AM.  Procedure briefly explained to patient.  She verbalized some understanding of procedure.  She has recently extracted her IV another will be placed in the left arm prior to procedure.  Anticipate home tomorrow after CXR and interrogation back to ECF in [place].  Consent on chart and orders placed for dual chamber pacemaker for 2nd degree HB.

## 2019-06-21 NOTE — PROGRESS NOTES
2 RN skin check completed at bedside with MARIBELL OSORIO  No wounds found and skin intact behind ears, back, sacrum and both heels.

## 2019-06-21 NOTE — DISCHARGE PLANNING
Care Transition Team Assessment    Information Source  Orientation : (P) Disoriented to Event, Disoriented to Person, Disoriented to Place, Disoriented to Time  Information Given By: Relative  Informant's Name: Sarah Davila         Elopement Risk  Legal Hold: (P) No  Ambulatory or Self Mobile in Wheelchair: (P) No-Not an Elopement Risk  Elopement Risk: (P) Not at Risk for Elopement    Interdisciplinary Discharge Planning  Does Admitting Nurse Feel This Could be a Complex Discharge?: Yes  Patient or legal guardian wants to designate a caregiver (see row info): No  Support Systems:  / , Family Member(s), Children  Housing / Facility: Assisted Living Residence  Name of Care Facility: [place]  Do You Take your Prescribed Medications Regularly: Yes  Able to Return to Previous ADL's: No  Mobility Issues: No  Assistance Needed: Yes  Durable Medical Equipment: Not Applicable    Discharge Preparedness  What is your plan after discharge?: Other (comment) ([facility])  What are your discharge supports?: Child         Finances  Financial Barriers to Discharge: Yes    Vision / Hearing Impairment  Vision Impairment : Yes  Right Eye Vision: (P) Impaired, Wears Glasses  Left Eye Vision: (P) Impaired, Wears Glasses  Hearing Impairment : Yes  Hearing Impairment: (P) Both Ears, Hearing Device(s) Available  Does Pt Need Special Equipment for the Hearing Impaired?: No              Domestic Abuse  Have you ever been the victim of abuse or violence?: No  Physical Abuse or Sexual Abuse: No  Verbal Abuse or Emotional Abuse: No  Possible Abuse Reported to:: Not Applicable              Anticipated Discharge Information  Anticipated discharge disposition:  (Back to [facility])

## 2019-06-21 NOTE — PROGRESS NOTES
Per patient's grand-daughter Sarah patient is a DNR/DNI and has paperwork. Writer requested that grand daughter or Uncle Jarrod please Fax that paperwork over to the floor. Along with [facility]  Paperwork which states that Uncle (Jarrod) is primary and grand-daughter (Sarah) secondary.  Pending all this paperwork, however Dr. Poe is aware.

## 2019-06-21 NOTE — PROGRESS NOTES
Cardiology Follow Up Progress Note    Date of Service  6/21/2019    Attending Physician  Nichole Heller M.D.    Chief Complaint   Fall    HPI  Charmaine Cordova is a 89 y.o. female admitted 6/20/2019 with a history of dementia, hypertension, congestive heart failure of   undetermined etiology, renal insufficiency, hypothyroidism, and impaired   hearing, who was transferred from [facility] to   [facility] for evaluation of a 2:1 AV block with consideration of a   need for a permanent pacemaker.    Interim Events  6/21: Denies any headache, chest pain or shortness of breath.  No events per RN.    Review of Systems  Review of Systems   HENT: Positive for hearing loss.    Respiratory: Negative for chest tightness and shortness of breath.    Cardiovascular: Negative for chest pain, palpitations and leg swelling.   Musculoskeletal: Negative for myalgias.   Neurological: Negative for dizziness and headaches.       Vital signs in last 24 hours  Temp:  [36.6 °C (97.8 °F)-36.8 °C (98.2 °F)] 36.6 °C (97.8 °F)  Pulse:  [48-53] 53  Resp:  [18] 18  BP: (144-154)/(54-66) 144/66  SpO2:  [90 %-93 %] 93 %    Physical Exam  Physical Exam   Constitutional: She is oriented to person, place, and time. No distress.   Petite.  Elderly.  No respiratory distress.   HENT:   Head: Normocephalic and atraumatic.   Eyes: EOM are normal. No scleral icterus.   Neck: No JVD present.       Cardiovascular: Normal rate, regular rhythm, S1 normal, S2 normal and intact distal pulses.  Exam reveals no gallop and no friction rub.    Murmur heard.  Pulmonary/Chest: Effort normal and breath sounds normal. She has no wheezes. She has no rhonchi. She has no rales.   Musculoskeletal: She exhibits no edema.   Neurological: She is alert and oriented to person, place, and time.   Skin: Skin is warm and dry.   Psychiatric: She has a normal mood and affect. Her behavior is normal.       Lab Review  Lab Results   Component Value  Date/Time    WBC 6.4 06/21/2019 02:21 AM    RBC 3.92 (L) 06/21/2019 02:21 AM    HEMOGLOBIN 11.9 (L) 06/21/2019 02:21 AM    HEMATOCRIT 36.8 (L) 06/21/2019 02:21 AM    MCV 93.9 06/21/2019 02:21 AM    MCH 30.4 06/21/2019 02:21 AM    MCHC 32.3 (L) 06/21/2019 02:21 AM    MPV 9.4 06/21/2019 02:21 AM      Lab Results   Component Value Date/Time    SODIUM 138 06/21/2019 02:21 AM    POTASSIUM 3.9 06/21/2019 02:21 AM    CHLORIDE 105 06/21/2019 02:21 AM    CO2 27 06/21/2019 02:21 AM    GLUCOSE 86 06/21/2019 02:21 AM    BUN 17 06/21/2019 02:21 AM    CREATININE 1.23 06/21/2019 02:21 AM    CREATININE 1.2 05/31/2005 10:52 AM      Lab Results   Component Value Date/Time    ASTSGOT 17 06/21/2019 02:21 AM    ALTSGPT 13 06/21/2019 02:21 AM     No results found for: CHOLSTRLTOT, LDL, HDL, TRIGLYCERIDE, TROPONINI          Cardiac Imaging and Procedures Review  EKG:  My personal interpretation of the EKG dated 6/20/2019 is AV block, rate 41, nonspecific ST-T wave abnormality    Imaging  Chest X-Ray: Pending.    Assessment/Plan  1.  Conduction abnormality with 2:1 atrioventricular block with narrow QRS.  2.  Ground-level fall mechanism unclear either mechanical from tripping or syncope.  3.  Hypertension - currently controlled.  4.  Hypothyroidism, on thyroid supplementation with slightly elevated free T4.  5.  Systolic murmur.  6.  Dementia.  7.  Reported history of congestive heart failure, type unknown.  8.  General debility.  9.  Dementia.  10.  Renal insufficiency.     Recommendation Discussion  1.  I spoke with the patient's son Jarrod who has DURABLE POWER OF  explaining him to him the nature of his mother's cardiac condition and the recommendation that she undergo permanent pacemaker explaining the benefits, risks and he was agreeable to having us proceed to perform a permanent pacemaker on her his mother.  2.  CXR.  3.  Echocardiogram pending to rule out valvular disease and assess right left ventricular function.  4.   Reviewed with bedside RN.    Thank you for allowing me to participate in the care of this patient.    Please contact me with any questions.    Jay Robles M.D.   Cardiologist, [facility]  [phone]

## 2019-06-21 NOTE — ANESTHESIA PROCEDURE NOTES
Airway  Date/Time: 6/21/2019 4:21 PM  Performed by: VALDEMAR CARTWRIGHT  Authorized by: VALDEMAR CARTWRIGHT     Location:  OR  Urgency:  Elective  Indications for Airway Management:  Anesthesia  Spontaneous Ventilation: absent    Sedation Level:  Deep  Preoxygenated: Yes    Final Airway Type:  Supraglottic airway  Final Supraglottic Airway:  Standard LMA  SGA Size:  4  Cuff Pressure (cm H2O):  30  Number of Attempts at Approach:  1

## 2019-06-21 NOTE — CONSULTS
"DATE OF SERVICE:  06/20/2019    REQUESTING PHYSICIAN:  Antony Champion MD    REASON FOR CONSULTATION:  AV block.    HISTORY OF PRESENT ILLNESS:  The patient is a 89-year-old  female   with a history of dementia, hypertension, congestive heart failure of   undetermined etiology, renal insufficiency, hypothyroidism, and impaired   hearing, who was transferred from [facility] to   [facility] for evaluation of a 2:1 AV block with consideration of a   need for a permanent pacemaker.    The patient has poor memory recall and cannot provide specific details as to   the events leading up to her hospitalization.  Medical information is obtained from   the medical records of her hospitalization at [facility].  The patient resides in a nursing home and was taken to [facility] in [place], [place] on 06/19/2019 after reportedly tripping and   suffering a ground level fall.  She sustained a head laceration to the occipital area.    She denied any loss of consciousness, chest pain or shortness of breath.  The   certainty of the patient's history is unclear, however and it was not known as to   whether or not the patient tripped or had a syncopal episode resulting in her fall.    Currently, the patient denies any chest pain, headache, shortness of breath.    She denies any knowledge of prior heart disease despite records indicating a   history of \"congestive heart failure, not otherwise specified.\"    ALLERGIES:  TO SULFA.    MEDICATIONS:  Prior to her initial admission included as listed in the medical record:  1.  Trazodone.  2.  Aldactone.  3.  Risperdal.  4.  Synthroid.  5.  Lasix.  6.  Advair Diskus.  7.  Escitalopram.  8.  Aspirin.  9.  Amlodipine.    CURRENT MEDICATIONS:  1.  Aspirin 81 mg daily.  2.  Citalopram 20 mg daily.  3.  Levothyroxine 175 mcg daily.  4.  Risperdal 0.25 mg each evening.  5.  Heparin 5000 units subcu every " 8 hours.    PAST MEDICAL HISTORY:  1.  Hypertension.  2.  Hypothyroidism.  3.  Congestive heart failure, not otherwise specified.  4.  Dementia.  5.  History of depression and/or anxiety.    FAMILY HISTORY:  Unclear to the patient, but no specified family history of   premature heart disease.    PAST SURGICAL HISTORY:  Unknown to the patient.    SOCIAL HISTORY:  No reported history of smoking or alcohol use.  She currently   resides in [facility].    REVIEW OF SYSTEMS:  Limited to that which is listed above due to the patient's   poor memory recall.    PHYSICAL EXAMINATION:  VITAL SIGNS:  Blood pressure 144/62, pulse 41, temperature afebrile.  GENERAL:  Pleasant elderly female who has some difficulty hearing.  No   respiratory distress.  HEENT:  No facial asymmetry.  Bilateral hearing aids.  NECK:  Elevated jugular venous pressure.  Carotid pulses not palpated.  No   appreciable bruits.  CHEST:  Increased AP diameter.  LUNGS:  Diminished breath sounds with no distinct wheezes, rales, or rhonchi.  CARDIAC:  Regular rate and rhythm with a faint harsh systolic murmur.  No   diastolic murmurs, gallops, or rubs appreciated.  ABDOMEN:  Protuberant, soft, nontender.  No guarding.  EXTREMITIES:  No clubbing, cyanosis, or edema.  Pulses 1+ radial, femoral, and   trace pedal pulses.  NEUROLOGIC:  Can move all extremities, follows commands.  Not fully oriented,   but alert.    EKG outside facility 06/19/2019:  2:1 AV block, narrow QRS 88 msec.  Rate 51.    Head CT scan 06/19/2019:  1.  No acute intracranial abnormalities identified.  2.  Moderate diffuse cerebral volume loss.  3.  Moderate nonspecific decreased attenuation of periventricular and   subcortical white matter, which is often secondary to microangiopathic   ischemia.    LABORATORY DATA:  06/20/2019, WBC 5900, hemoglobin 11.8, MCV 92, platelets   248,000.  Glucose 73, BUN 17, creatinine 1.4, sodium 141, potassium 4.1,   magnesium 2.1, albumin 3.2.   Urinalysis unremarkable.  TSH 0.143, which is   normal.  TSH slightly elevated 2.06.  Troponin level less than 0.02.    Cervical spine x-ray 06/19/2019, moderate cervical spondylosis.    ASSESSMENT:  1.  Conduction abnormality with 2:1 atrioventricular block with narrow QRS.  2.  Ground-level fall mechanism unclear either mechanical from tripping or syncope.  3.  Hypertension - currently controlled.  4.  Hypothyroidism, on thyroid supplementation with slightly elevated free T4.  5.  Systolic murmur.  6.  Dementia.  7.  Reported history of congestive heart failure, type unknown.  8.  General debility.  9.  Dementia.  10.  Renal insufficiency.    PLAN:  1.  Admit to telemetry.  2.  Repeat EKG.  3.  Continue telemetry monitoring.  4.  Echocardiogram to rule out significant valvular disease and to assess left   ventricular function.  5.  Laboratory evaluation.  6.  Chest x-ray.  7.  Continue preadmission medications.  8.  Based on the current EKG the level of the AV block cannot be determined as to   whether or not this is a Mobitz 1 at the level of the AV node or Mobitz 2 below the   His bundle, statistically most likely the former rather than the latter however in view   of the uncertainty of circumstances resulting in the patient's fall would probably air   on the side of safety and proceed with a permanent pacemaker.  We will review   these considerations with EP consult.  9.  Will try to contact any family members or the individual with durable power of    to obtain more medical information..    Thank you for allowing me to see this lady in consultation.       ____________________________________     MD MAURISIO MOSHER / ANUJA    DD:  06/20/2019 16:53:23  DT:  06/20/2019 17:55:24    D#:  [id number]  Job#:  [id number]

## 2019-06-22 ENCOUNTER — APPOINTMENT (OUTPATIENT)
Dept: RADIOLOGY | Facility: MEDICAL CENTER | Age: 84
DRG: 243 | End: 2019-06-22
Attending: INTERNAL MEDICINE
Payer: MEDICARE

## 2019-06-22 LAB
ALBUMIN SERPL BCP-MCNC: 3.3 G/DL (ref 3.2–4.9)
ALBUMIN/GLOB SERPL: 1.4 G/DL
ALP SERPL-CCNC: 69 U/L (ref 30–99)
ALT SERPL-CCNC: 12 U/L (ref 2–50)
ANION GAP SERPL CALC-SCNC: 10 MMOL/L (ref 0–11.9)
AST SERPL-CCNC: 19 U/L (ref 12–45)
BASOPHILS # BLD AUTO: 0.3 % (ref 0–1.8)
BASOPHILS # BLD: 0.02 K/UL (ref 0–0.12)
BILIRUB SERPL-MCNC: 0.6 MG/DL (ref 0.1–1.5)
BUN SERPL-MCNC: 16 MG/DL (ref 8–22)
CALCIUM SERPL-MCNC: 8.6 MG/DL (ref 8.5–10.5)
CHLORIDE SERPL-SCNC: 104 MMOL/L (ref 96–112)
CO2 SERPL-SCNC: 22 MMOL/L (ref 20–33)
CREAT SERPL-MCNC: 0.94 MG/DL (ref 0.5–1.4)
EKG IMPRESSION: NORMAL
EOSINOPHIL # BLD AUTO: 0 K/UL (ref 0–0.51)
EOSINOPHIL NFR BLD: 0 % (ref 0–6.9)
ERYTHROCYTE [DISTWIDTH] IN BLOOD BY AUTOMATED COUNT: 45.7 FL (ref 35.9–50)
FOLATE SERPL-MCNC: 19.6 NG/ML
GLOBULIN SER CALC-MCNC: 2.3 G/DL (ref 1.9–3.5)
GLUCOSE SERPL-MCNC: 100 MG/DL (ref 65–99)
HCT VFR BLD AUTO: 44.2 % (ref 37–47)
HGB BLD-MCNC: 13.5 G/DL (ref 12–16)
IMM GRANULOCYTES # BLD AUTO: 0.02 K/UL (ref 0–0.11)
IMM GRANULOCYTES NFR BLD AUTO: 0.3 % (ref 0–0.9)
IRON SATN MFR SERPL: 15 % (ref 15–55)
IRON SERPL-MCNC: 40 UG/DL (ref 40–170)
LYMPHOCYTES # BLD AUTO: 0.6 K/UL (ref 1–4.8)
LYMPHOCYTES NFR BLD: 10.5 % (ref 22–41)
MCH RBC QN AUTO: 29.5 PG (ref 27–33)
MCHC RBC AUTO-ENTMCNC: 30.5 G/DL (ref 33.6–35)
MCV RBC AUTO: 96.5 FL (ref 81.4–97.8)
MONOCYTES # BLD AUTO: 0.21 K/UL (ref 0–0.85)
MONOCYTES NFR BLD AUTO: 3.7 % (ref 0–13.4)
NEUTROPHILS # BLD AUTO: 4.89 K/UL (ref 2–7.15)
NEUTROPHILS NFR BLD: 85.2 % (ref 44–72)
NRBC # BLD AUTO: 0 K/UL
NRBC BLD-RTO: 0 /100 WBC
PLATELET # BLD AUTO: 208 K/UL (ref 164–446)
PMV BLD AUTO: 9.3 FL (ref 9–12.9)
POTASSIUM SERPL-SCNC: 4.9 MMOL/L (ref 3.6–5.5)
PROT SERPL-MCNC: 5.6 G/DL (ref 6–8.2)
RBC # BLD AUTO: 4.58 M/UL (ref 4.2–5.4)
SODIUM SERPL-SCNC: 136 MMOL/L (ref 135–145)
TIBC SERPL-MCNC: 263 UG/DL (ref 250–450)
VIT B12 SERPL-MCNC: 483 PG/ML (ref 211–911)
WBC # BLD AUTO: 5.7 K/UL (ref 4.8–10.8)

## 2019-06-22 PROCEDURE — 770020 HCHG ROOM/CARE - TELE (206)

## 2019-06-22 PROCEDURE — 83540 ASSAY OF IRON: CPT

## 2019-06-22 PROCEDURE — 83550 IRON BINDING TEST: CPT

## 2019-06-22 PROCEDURE — A9270 NON-COVERED ITEM OR SERVICE: HCPCS | Performed by: INTERNAL MEDICINE

## 2019-06-22 PROCEDURE — 99024 POSTOP FOLLOW-UP VISIT: CPT | Performed by: NURSE PRACTITIONER

## 2019-06-22 PROCEDURE — 80053 COMPREHEN METABOLIC PANEL: CPT

## 2019-06-22 PROCEDURE — 700111 HCHG RX REV CODE 636 W/ 250 OVERRIDE (IP): Performed by: INTERNAL MEDICINE

## 2019-06-22 PROCEDURE — 93005 ELECTROCARDIOGRAM TRACING: CPT | Performed by: INTERNAL MEDICINE

## 2019-06-22 PROCEDURE — 99024 POSTOP FOLLOW-UP VISIT: CPT | Performed by: INTERNAL MEDICINE

## 2019-06-22 PROCEDURE — 71045 X-RAY EXAM CHEST 1 VIEW: CPT

## 2019-06-22 PROCEDURE — 99232 SBSQ HOSP IP/OBS MODERATE 35: CPT | Performed by: INTERNAL MEDICINE

## 2019-06-22 PROCEDURE — 85025 COMPLETE CBC W/AUTO DIFF WBC: CPT

## 2019-06-22 PROCEDURE — 36415 COLL VENOUS BLD VENIPUNCTURE: CPT

## 2019-06-22 PROCEDURE — 700102 HCHG RX REV CODE 250 W/ 637 OVERRIDE(OP): Performed by: INTERNAL MEDICINE

## 2019-06-22 PROCEDURE — 82746 ASSAY OF FOLIC ACID SERUM: CPT

## 2019-06-22 PROCEDURE — 93010 ELECTROCARDIOGRAM REPORT: CPT | Performed by: INTERNAL MEDICINE

## 2019-06-22 PROCEDURE — 306565 RIGID MIT RESTRAINT(PAIR): Performed by: HOSPITALIST

## 2019-06-22 PROCEDURE — 82607 VITAMIN B-12: CPT

## 2019-06-22 PROCEDURE — 97165 OT EVAL LOW COMPLEX 30 MIN: CPT

## 2019-06-22 RX ADMIN — SENNOSIDES AND DOCUSATE SODIUM 2 TABLET: 8.6; 5 TABLET ORAL at 05:43

## 2019-06-22 RX ADMIN — LEVOTHYROXINE SODIUM 150 MCG: 75 TABLET ORAL at 05:43

## 2019-06-22 RX ADMIN — HEPARIN SODIUM 5000 UNITS: 5000 INJECTION, SOLUTION INTRAVENOUS; SUBCUTANEOUS at 15:16

## 2019-06-22 RX ADMIN — SENNOSIDES AND DOCUSATE SODIUM 2 TABLET: 8.6; 5 TABLET ORAL at 17:16

## 2019-06-22 RX ADMIN — ASPIRIN 81 MG 81 MG: 81 TABLET ORAL at 05:43

## 2019-06-22 RX ADMIN — RISPERIDONE 0.25 MG: 0.5 TABLET ORAL at 17:16

## 2019-06-22 RX ADMIN — ACETAMINOPHEN 650 MG: 325 TABLET, FILM COATED ORAL at 08:37

## 2019-06-22 RX ADMIN — ACETAMINOPHEN 650 MG: 325 TABLET, FILM COATED ORAL at 17:17

## 2019-06-22 RX ADMIN — TRAZODONE HYDROCHLORIDE 50 MG: 50 TABLET ORAL at 20:55

## 2019-06-22 RX ADMIN — HEPARIN SODIUM 5000 UNITS: 5000 INJECTION, SOLUTION INTRAVENOUS; SUBCUTANEOUS at 20:54

## 2019-06-22 RX ADMIN — HEPARIN SODIUM 5000 UNITS: 5000 INJECTION, SOLUTION INTRAVENOUS; SUBCUTANEOUS at 05:43

## 2019-06-22 RX ADMIN — CITALOPRAM HYDROBROMIDE 20 MG: 20 TABLET ORAL at 05:43

## 2019-06-22 ASSESSMENT — PAIN SCALES - PAIN ASSESSMENT IN ADVANCED DEMENTIA (PAINAD)
TOTALSCORE: 0
BREATHING: NORMAL
CONSOLABILITY: NO NEED TO CONSOLE
FACIALEXPRESSION: SMILING OR INEXPRESSIVE
CONSOLABILITY: NO NEED TO CONSOLE
TOTALSCORE: 0
FACIALEXPRESSION: SMILING OR INEXPRESSIVE
BODYLANGUAGE: RELAXED
BODYLANGUAGE: RELAXED
BREATHING: NORMAL

## 2019-06-22 ASSESSMENT — COGNITIVE AND FUNCTIONAL STATUS - GENERAL
EATING MEALS: A LITTLE
DRESSING REGULAR UPPER BODY CLOTHING: A LOT
TOILETING: A LOT
DAILY ACTIVITIY SCORE: 13
HELP NEEDED FOR BATHING: A LOT
DRESSING REGULAR LOWER BODY CLOTHING: A LOT
SUGGESTED CMS G CODE MODIFIER DAILY ACTIVITY: CL
PERSONAL GROOMING: A LOT

## 2019-06-22 ASSESSMENT — ACTIVITIES OF DAILY LIVING (ADL): TOILETING: REQUIRES ASSIST

## 2019-06-22 ASSESSMENT — ENCOUNTER SYMPTOMS
MYALGIAS: 0
HEADACHES: 0
PALPITATIONS: 0
CHEST TIGHTNESS: 0
DIZZINESS: 0
CONFUSION: 1
SHORTNESS OF BREATH: 0

## 2019-06-22 NOTE — PROGRESS NOTES
Patient pulled out her ultrasound guided IV, yelling out. Uncooperative. Patient refusing skin check and any movement at this time. Confused, agitated.

## 2019-06-22 NOTE — PROGRESS NOTES
2 RN skin check complete with Dacia RN  Devices in place Silicone NC, R wrist restraint, brace across chest, glasses, hearing aids. .  Skin assessed under devices yes.  Confirmed pressure ulcers found on n/a.  New potential pressure ulcers noted on n/a.   The following interventions in place: Q2 turns, O2 tubing offloaded, freq bed changes for incontinence, heels floated on a pillow, waffle mattress, moisturizer.    Head has a wound with 2 stables, c/d/i  BL ears pink/blanching  BL UE severe bruising  BL elbows pink/blanching  Sacrum pink/blanching  BL LE dry/flaky/dusky  BL heels red/slow to paty    Chest is very bruised/fragile/blisters. Pictures taken and uploaded. Incision under dressing is c/d/i. Changed by cardiology. Using cover-roll and dry gauze.

## 2019-06-22 NOTE — PROGRESS NOTES
2 RN skin check completed with Max, RN  Devices in place left arm sling, glasses, oxygen, right arm soft wrist restraint  Skin assessed under devices on  Confirmed pressure ulcers found on N/A.  New potential pressure ulcers noted on N/A.  The following interventions in place; turn Q 2, barrier cream, barrier wipes, float heels, waffle mattress.      Skin was assessed fpr abnormalities head to toe and noted in detail skin note.       FACE/NECK: intact     BACK of HEAD: 2 staples on right side of the crown. Well approximated. CDI.      EARS: pink and blanching     CHEST: scattered bruising. Large bruise mid to left chest. Pacemaker dressing intact.      ABDOMEN: scattered bruising     BACK: scattered bruising. Large bruise and small skin tear on left upper back    BUTTOCKS/SACRUM: red and blanching     ARMS: scattered bruising. Skin tear left arm below sling    LEGS: discolored, dry, flaking.      FEET: heels red and slow to paty.    This completes the 2-RN skin assessment.

## 2019-06-22 NOTE — PROGRESS NOTES
Hospital Medicine Daily Progress Note    Date of Service  6/22/2019    Chief Complaint  89 y.o. 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 Cardiology consulted. Echocardiogram unremarkable. Discussion was done with her son who is power of  and agreeable for pacemaker placement.        Interval Problem Update  6/22. p patient has been pleasantly confused.  Patient has dementia at baseline.  Patient overall has no significant acute event.  Remains stable and complains of general fatigue and body achiness. Patient's pain is general, 2-3/10, intermittent and does not radiate to other location, sharp and with some tingling. Can be controlled by pain meds.     Consultants/Specialty  Cardiology     Code Status  Full code     Disposition  Back to patient assisted living when bed available    Review of Systems  Review of Systems   Unable to perform ROS: Dementia        Physical Exam  Temp:  [36.2 °C (97.1 °F)-36.7 °C (98 °F)] 36.6 °C (97.8 °F)  Pulse:  [60-64] 61  Resp:  [14-20] 18  BP: (105-135)/(46-66) 110/46  SpO2:  [92 %-98 %] 94 %    Physical Exam   Constitutional: She appears well-nourished.   Frail, hard of hearing.    HENT:   Head: Normocephalic and atraumatic.   Nose: Nose normal.   Mouth/Throat: No oropharyngeal exudate.   Eyes: Conjunctivae and EOM are normal.   Neck: Normal range of motion. Neck supple. No JVD present. No thyromegaly present.   Cardiovascular: Normal rate and regular rhythm.  Exam reveals no gallop and no friction rub.    Murmur heard.   Systolic murmur is present with a grade of 3/6   Pulmonary/Chest: Effort normal and breath sounds normal. No respiratory distress. She has no wheezes. She has no rales. She exhibits no tenderness.   Abdominal: Soft.  Bowel sounds are normal. She exhibits no distension and no mass. There is no tenderness. There is no rebound and no guarding.   Musculoskeletal: Normal range of motion. She exhibits no edema or tenderness.   Lymphadenopathy:     She has no cervical adenopathy.   Neurological: She is alert. No cranial nerve deficit.   Hard of hearing. Cranial nerve grossly intact    Skin: Skin is warm. No rash noted.   Multiple bruises   Psychiatric: Her behavior is normal.       Fluids    Intake/Output Summary (Last 24 hours) at 06/22/19 1609  Last data filed at 06/22/19 0825   Gross per 24 hour   Intake             1150 ml   Output              705 ml   Net              445 ml       Laboratory  Recent Labs      06/20/19   1658  06/21/19   0221  06/22/19   0623   WBC  6.2  6.4  5.7   RBC  4.30  3.92*  4.58   HEMOGLOBIN  13.1  11.9*  13.5   HEMATOCRIT  39.6  36.8*  44.2   MCV  92.1  93.9  96.5   MCH  30.5  30.4  29.5   MCHC  33.1*  32.3*  30.5*   RDW  45.0  45.3  45.7   PLATELETCT  267  252  208   MPV  9.2  9.4  9.3     Recent Labs      06/20/19   1711  06/21/19   0221  06/22/19   0623   SODIUM  138  138  136   POTASSIUM  4.0  3.9  4.9   CHLORIDE  103  105  104   CO2  26  27  22   GLUCOSE  88  86  100*   BUN  18  17  16   CREATININE  1.46*  1.23  0.94   CALCIUM  9.1  8.5  8.6     Recent Labs      06/20/19   1658   INR  1.01               Imaging  DX-CHEST-PORTABLE (1 VIEW)   Final Result         1.  Pulmonary edema and/or infiltrates are identified, which appear somewhat increased since the prior exam.   2.  Atherosclerosis      DX-CHEST-PORTABLE (1 VIEW)   Final Result      Mild worsening reticular opacification may indicate edema      New left pacer with no evidence of pneumothorax      EC-ECHOCARDIOGRAM COMPLETE W/O CONT   Final Result      DX-CHEST-PORTABLE (1 VIEW)   Final Result      No acute cardiac or pulmonary abnormality is noted.      CL-PERMANENT PACEMAKER INSERTION    (Results Pending)        Assessment/Plan  AV block-  (present on admission)   Assessment & Plan    Pacer implantation 6/21  Echo unremarkable   Syncope on admission currently resolved  Follow-up as outpatient as needed in pacer clinic.     Normocytic anemia- (present on admission)   Assessment & Plan    No acute bleeding  Iron panel/b12/ferritin/folic acid to follow  HH stable      Syncopal episodes- (present on admission)   Assessment & Plan    Seems to cardiogenic reason. Continue tele monitoring at this time   resolved     Stage 3 chronic kidney disease (HCC)- (present on admission)   Assessment & Plan    She has history of chronic kidney disease.  Avoid nephrotoxins per  Medication doses as per renal function.  Continue to monitor  Patient's kidney function has been stable, creatinine 0.94   Hypothyroidism- (present on admission)   Assessment & Plan    TSH low/free t4 elevated  I did decrease levothyroxine 175---> 150 mcg daily. High risk of over correction of her thyroid on her age   Continue to monitor           VTE prophylaxis: heparin       Current Facility-Administered Medications:   •  levothyroxine (SYNTHROID) tablet 150 mcg, 150 mcg, Oral, AM ES, Nichole Heller M.D., 150 mcg at 06/22/19 0543  •  lactated ringers infusion, , Intravenous, Continuous, Aly Price D.YESICA, Stopped at 06/22/19 0500  •  iohexol (OMNIPAQUE) 350 mg/mL, 1-50 mL, Intravenous, Once, Kade Phelps M.D., Stopped at 06/21/19 1700  •  aspirin (ASA) chewable tab 81 mg, 81 mg, Oral, DAILY, Leanne Poe M.D., 81 mg at 06/22/19 0543  •  citalopram (CELEXA) tablet 20 mg, 20 mg, Oral, DAILY, Leanne Poe M.D., 20 mg at 06/22/19 0543  •  traZODone (DESYREL) tablet 50 mg, 50 mg, Oral, HS PRN, Leanne Poe M.D., 50 mg at 06/21/19 2134  •  risperiDONE (RISPERDAL) tablet 0.25 mg, 0.25 mg, Oral, Q EVENING, Leanne Poe M.D., 0.25 mg at 06/21/19 1923  •  senna-docusate (PERICOLACE or SENOKOT S) 8.6-50 MG per tablet 2 Tab, 2 Tab, Oral, BID, 2 Tab at 06/22/19 0511  **AND** polyethylene glycol/lytes (MIRALAX) PACKET 1 Packet, 1 Packet, Oral, QDAY PRN **AND** magnesium hydroxide (MILK OF MAGNESIA) suspension 30 mL, 30 mL, Oral, QDAY PRN **AND** bisacodyl (DULCOLAX) suppository 10 mg, 10 mg, Rectal, QDAY PRN, Leanne Poe M.D.  •  Respiratory Care per Protocol, , Nebulization, Continuous RT, Leanne Poe M.D.  •  NS infusion 1,000 mL, 1,000 mL, Intravenous, Continuous, Leanne Poe M.D., Last Rate: 75 mL/hr at 06/20/19 2346, 1,000 mL at 06/20/19 2346  •  heparin injection 5,000 Units, 5,000 Units, Subcutaneous, Q8HRS, Leanne Poe M.D., 5,000 Units at 06/22/19 1516  •  acetaminophen (TYLENOL) tablet 650 mg, 650 mg, Oral, Q6HRS PRN, Leanne Poe M.D., 650 mg at 06/22/19 0852

## 2019-06-22 NOTE — PROGRESS NOTES
Cardiology Follow Up Progress Note    Date of Service  6/22/2019    Attending Physician  Lizzy Carcamo M.D.    Chief Complaint   Fall     HPI  Charmaine Cordova is a 89 y.o. female admitted 6/20/2019 with fall.  Past medical history of dementia, hypertension, congestive heart failure, renal insufficiency, hypothyroidism, and impaired hearing.  Patient was noted to have 2-1 AV block and underwent successful pacemaker placement with Dr. jacques on 2 6/21/2019 with [employer] [place] model.    IMPLANTED DEVICE INFORMATION:  Pulse generator is a SJM model [id number]  Serial # [id number]     LEAD INFORMATION:  Right ventricular lead is a SJM model #[id number]/[id number] , serial #[id number] ,R wave 6.0 millivolts, threshold 0.75 Volts at 0.5 milliseconds, pacing impedance 990 Ohms.  Interim Events  6/22/19: patient resting in bed, [place]. Some confusion with time and situation but oriented to self and place. Paced overnight and this morning. Vitals sign     Review of Systems  Review of Systems   Unable to perform ROS: Dementia   HENT: Positive for hearing loss.    Respiratory: Negative for chest tightness and shortness of breath.    Neurological: Negative for dizziness.   Psychiatric/Behavioral: Positive for confusion.       Vital signs in last 24 hours  Temp:  [36.2 °C (97.1 °F)-36.6 °C (97.9 °F)] 36.6 °C (97.8 °F)  Pulse:  [39-64] 60  Resp:  [14-20] 18  BP: (105-146)/(46-66) 135/52  SpO2:  [92 %-98 %] 95 %    Physical Exam  Physical Exam   Constitutional: No distress.   Cardiovascular: Normal rate and regular rhythm.    Pacemaker present on the left upper chest with significant amount of bruising over the chest.    Pulmonary/Chest: Effort normal.   Musculoskeletal: She exhibits no edema.   Neurological: She is alert.   Skin: Ecchymosis noted. She is not diaphoretic. No erythema.            Lab Review  Lab Results   Component Value Date/Time    WBC 5.7 06/22/2019 06:23 AM    RBC 4.58 06/22/2019 06:23 AM    HEMOGLOBIN 13.5 06/22/2019 06:23 AM     HEMATOCRIT 44.2 06/22/2019 06:23 AM    MCV 96.5 06/22/2019 06:23 AM    MCH 29.5 06/22/2019 06:23 AM    MCHC 30.5 (L) 06/22/2019 06:23 AM    MPV 9.3 06/22/2019 06:23 AM      Lab Results   Component Value Date/Time    SODIUM 136 06/22/2019 06:23 AM    POTASSIUM 4.9 06/22/2019 06:23 AM    CHLORIDE 104 06/22/2019 06:23 AM    CO2 22 06/22/2019 06:23 AM    GLUCOSE 100 (H) 06/22/2019 06:23 AM    BUN 16 06/22/2019 06:23 AM    CREATININE 0.94 06/22/2019 06:23 AM    CREATININE 1.2 05/31/2005 10:52 AM      Lab Results   Component Value Date/Time    ASTSGOT 19 06/22/2019 06:23 AM    ALTSGPT 12 06/22/2019 06:23 AM     No results found for: CHOLSTRLTOT, LDL, HDL, TRIGLYCERIDE, TROPONINI          Cardiac Imaging and Procedures Review  EKG:    SINUS RHYTHM   NONSPECIFIC IVCD WITH LAD   LVH WITH SECONDARY REPOLARIZATION ABNORMALITY   INFERIOR INFARCT, ACUTE (RCA)   ANTERIOR INFARCT, OLD   LATERAL LEADS ARE ALSO INVOLVED   PROBABLE RV INVOLVEMENT, SUGGEST RECORDING RIGHT PRECORDIAL LEADS   ARTIFACT IN LEAD(S) III,aVL,V1,V2,V3,V4,V5,V6   Compared to ECG 06/21/2019 17:31:36   Intraventricular conduction delay now present   Left ventricular hypertrophy now present   Early repolarization now present   Myocardial infarct finding now present   Ventricular-paced complex(es) or rhythm no longer present       Imaging  Chest X-Ray:   6/22/19  1.  Pulmonary edema and/or infiltrates are identified, which appear somewhat increased since the prior exam.  2.  Atherosclerosis      Assessment/Plan  No new Assessment & Plan notes have been filed under this hospital service since the last note was generated.  Service: Cardiology    1. 2: 1 AV block:  - Right ventricular lead is a SJM PPM with normal function.  - Device site is uncomplicated.    - CXR without PTX.  - EP will sign off, cleared to discharge from our standpoint.  Follow up is arranged in device clinic. Please call with any questions.     Pacer restrictions reviewed with patient. Do not  raise affected arm above head or behind back for six weeks. May remove arm sling after 24 hrs, please wear if trouble remembering arm limitation and prn at night. No heavy lifting/pushing with L arm for six weeks. No driving for first week. No showers first week. Keep dressing on, clean, and dry until seen follow up. Wound care reviewed. Instructed to report s/s of infection such as warmth/redness/drainage/swelling at site or fever/chills.  Patient verbalizes understanding.    Future Appointments  Date Time Provider Department Center   6/28/2019 9:15 AM PACER CHECK-[facility] B 2 [facility] None         Please contact me with any questions.    PAUL Castro   [facility]

## 2019-06-22 NOTE — PROGRESS NOTES
Tear noted in patient's skin from surgical drape around pacemaker insertion site.  Dermabond used to seal skin tear

## 2019-06-22 NOTE — OR NURSING
1719: Patient arrived from cath lab s/p permanent pacemaker placement with anesthesiologist and RN. Left upper chest access site; clean, dry, and soft. Patient is arousable upon calling. Shoulder immobilizer applied per order.    1745: Criteria met to transfer patient to Telemetry. Handoff report called to MARIBELL Lara. All questions answered.    1805: Patient transported to Telemetry via hospital bed with RN under cardiac monitoring. Receiving RN at bedside. Pacer site dressing reviewed with RN. No signs of distress. Handoff complete to MARIBELL Lara.

## 2019-06-22 NOTE — ANESTHESIA QCDR
2019 [facility] Clinical Data Registry (for Quality Improvement)     Postoperative nausea/vomiting risk protocol (Adult = 18 yrs and Pediatric 3-17 yrs)- (430 and 463)  General inhalation anesthetic (NOT TIVA) with PONV risk factors: Yes  Provision of anti-emetic therapy with at least 2 different classes of agents: Yes   Patient DID NOT receive anti-emetic therapy and reason is documented in Medical Record:  N/A    Multimodal Pain Management- (AQI59)  Patient undergoing Elective Surgery (i.e. Outpatient, or ASC, or Prescheduled Surgery prior to Hospital Admission): No  Use of Multimodal Pain Management, two or more drugs and/or interventions, NOT including systemic opioids: N/A  Exception: Documented allergy to multiple classes of analgesics: N/A    PACU assessment of acute postoperative pain prior to Anesthesia Care End- Applies to Patients Age = 18- (ABG7)  Initial PACU pain score is which of the following: < 7/10  Patient unable to report pain score: N/A    Post-anesthetic transfer of care checklist/protocol to PACU/ICU- (426 and 427)  Upon conclusion of case, patient transferred to which of the following locations: PACU/Non-ICU  Use of transfer checklist/protocol: Yes  Exclusion: Service Performed in Patient Hospital Room (and thus did not require transfer): N/A    PACU Reintubation- (AQI31)  General anesthesia requiring endotracheal intubation (ETT) along with subsequent extubation in OR or PACU: No  Required reintubation in the PACU: N/A  Extubation was a planned trial documented in the medical record prior to removal of the original airway device: N/A    Unplanned admission to ICU related to anesthesia service up through end of PACU care- (MD51)  Unplanned admission to ICU (not initially anticipated at anesthesia start time): No

## 2019-06-22 NOTE — PROGRESS NOTES
Shoulder immobilizer was delivered to PPU.   For questions or assistance applying immobilizer contact traction at ext. [phone].

## 2019-06-22 NOTE — THERAPY
"Occupational Therapy Evaluation completed.   Functional Status:  Moderate assist with mobility to EOB with sling; assist with sitting balance at EOB; min to max assist with self-care today which could improve with decreasing sling over next few days   Plan of Care: Will benefit from Occupational Therapy 3 times per week  Discharge Recommendations:  Equipment: Will Continue to Assess for Equipment Needs. Post-acute therapy Will return to previous 24 hour care facility and would benefit from continued skilled occupational therapy services.    See \"Rehab Therapy-Acute\" Patient Summary Report for complete documentation.    "

## 2019-06-22 NOTE — CARE PLAN
Problem: Communication  Goal: The ability to communicate needs accurately and effectively will improve  Outcome: PROGRESSING SLOWER THAN EXPECTED  Patient remains able to verbalize basic wants and needs. Remains confused.     Problem: Safety  Goal: Will remain free from injury  Outcome: PROGRESSING AS EXPECTED  Remains free from injury. Soft wrist restraint in place on right wrist.       Problem: Knowledge Deficit  Goal: Knowledge of disease process/condition, treatment plan, diagnostic tests, and medications will improve  Outcome: PROGRESSING SLOWER THAN EXPECTED  Patient shows no evidence of learning. Will continue to reassess.

## 2019-06-22 NOTE — PROGRESS NOTES
Patient alert to person only, calls out, forgetful, unable to follow simple commands. Pulled out her IV, needed ultrasound guidance to place new line. IV fluids infusing as ordered. Denies pain. Voiding on bedpan. No BM today. NPO for pacemaker placement, patient on monitor. Using call bell approp, bed locked, frequent rounding from nursing staff for safetym alarms on.

## 2019-06-22 NOTE — PROGRESS NOTES
Patient back from PPU at this time, bedside report taken with PPU RN. Dressing to left chest wall C/D/I. Patient lethargic, awakens to voice then immediately falls back asleep. Unable to take PO at this time. On monitor, Paced at 60 bpm. No distress, bed alarm on.

## 2019-06-22 NOTE — PROGRESS NOTES
Assumed care of pt, she is alert and orientedx4/denies pain. Discussed OPC with NOC RN. She is complaining of itching on her head. Scratched and lotion applied. Her R hand is in a restraint and L is in sling from pacer placement. Discussed safety with pt. Bed is locked in lowest position and call light/personal belongings are within reach.

## 2019-06-22 NOTE — ANESTHESIA TIME REPORT
Anesthesia Start and Stop Event Times     Date Time Event    6/21/2019 1602 Anesthesia Start     1720 Anesthesia Stop        Responsible Staff  06/21/19    Name Role Begin End    Aly Price D.O. Anesth 1602 1720        Preop Diagnosis (Free Text):  Pre-op Diagnosis             Preop Diagnosis (Codes):    Post op Diagnosis  Heart block      Premium Reason  A. 3PM - 7AM    Comments:

## 2019-06-22 NOTE — DISCHARGE PLANNING
Medical Social Work    Per charge RN, pt medically clear. LSW called [facility].  states they need to check with their supervisor to see if they can accept pt back today. They typically like to wait until Monday when management team is in. Awaiting call back.

## 2019-06-22 NOTE — PROGRESS NOTES
Cardiology Follow Up Progress Note    Date of Service  6/22/2019    Attending Physician  Nichole Heller M.D.    Chief Complaint   Fall    HPI  Charmaine Cordova is a 89 y.o. female admitted 6/20/2019 with a history of dementia, hypertension, congestive heart failure of   undetermined etiology, renal insufficiency, hypothyroidism, and impaired   hearing, who was transferred from [facility] to   [facility] for evaluation of a 2:1 AV block with consideration of a   need for a permanent pacemaker.    Interim Events  6/21: Denies any headache, chest pain or shortness of breath.  No events per RN.    Review of Systems  Review of Systems   HENT: Positive for hearing loss.    Respiratory: Negative for chest tightness and shortness of breath.    Cardiovascular: Negative for chest pain, palpitations and leg swelling.   Musculoskeletal: Negative for myalgias.   Neurological: Negative for dizziness and headaches.       Vital signs in last 24 hours  Temp:  [36.2 °C (97.1 °F)-36.6 °C (97.9 °F)] 36.6 °C (97.8 °F)  Pulse:  [39-64] 60  Resp:  [14-20] 18  BP: (105-146)/(46-66) 135/52  SpO2:  [92 %-98 %] 95 %    Physical Exam  Physical Exam   Constitutional: She is oriented to person, place, and time. No distress.   Petite.  Elderly.  No respiratory distress.   Neck: No JVD present.       Cardiovascular: Normal rate, regular rhythm, S1 normal, S2 normal and intact distal pulses.  Exam reveals no gallop and no friction rub.    Murmur heard.  Pulmonary/Chest: Effort normal and breath sounds normal. She has no wheezes. She has no rhonchi. She has no rales.   Left subclavian pacemaker generator site without hematoma.  Mild ecchymosis.  Nontender.   Musculoskeletal: She exhibits no edema.   Neurological: She is alert and oriented to person, place, and time.   Skin: Skin is warm and dry.   Psychiatric: She has a normal mood and affect. Her behavior is normal.       Lab Review  Lab Results   Component Value  Date/Time    WBC 5.7 06/22/2019 06:23 AM    RBC 4.58 06/22/2019 06:23 AM    HEMOGLOBIN 13.5 06/22/2019 06:23 AM    HEMATOCRIT 44.2 06/22/2019 06:23 AM    MCV 96.5 06/22/2019 06:23 AM    MCH 29.5 06/22/2019 06:23 AM    MCHC 30.5 (L) 06/22/2019 06:23 AM    MPV 9.3 06/22/2019 06:23 AM      Lab Results   Component Value Date/Time    SODIUM 136 06/22/2019 06:23 AM    POTASSIUM 4.9 06/22/2019 06:23 AM    CHLORIDE 104 06/22/2019 06:23 AM    CO2 22 06/22/2019 06:23 AM    GLUCOSE 100 (H) 06/22/2019 06:23 AM    BUN 16 06/22/2019 06:23 AM    CREATININE 0.94 06/22/2019 06:23 AM    CREATININE 1.2 05/31/2005 10:52 AM      Lab Results   Component Value Date/Time    ASTSGOT 19 06/22/2019 06:23 AM    ALTSGPT 12 06/22/2019 06:23 AM     No results found for: CHOLSTRLTOT, LDL, HDL, TRIGLYCERIDE, TROPONINI          Cardiac Imaging and Procedures Review  EKG:  My personal interpretation of the EKG dated 6/20/2019 is AV block, rate 41, nonspecific ST-T wave abnormality    Rhythm: 6/22: Ventricular paced rhythm, rate 60, reviewed by myself.    Imaging  Chest X-Ray: 6/22: No evidence of pneumothorax, pulmonary edema, reviewed by myself    ECHOCARDIOGRAM 06/21/2019  Normal left ventricular systolic function.  Left ventricular ejection fraction is visually estimated to be 65%.  Mild aortic stenosis.  Vmax is 2.6 m/s. Transvalvular gradients are - Peak: 27 mmHg, Mean: 11   mmHg.  Mild tricuspid regurgitation.  Estimated right ventricular systolic pressure is 45 mmHg.    Assessment/Plan  1.    Permanent pacemaker, single-chamber, 6/21/2019 for conduction abnormality with 2:1 atrioventricular block with narrow QRS and possible syncope.  2.  Ground-level fall mechanism unclear either mechanical from tripping or syncope.  3.  Hypertension - currently controlled.  4.  Hypothyroidism, on thyroid supplementation with slightly elevated free T4.  5.    Mild aortic stenosis  6.  Dementia.  7.  Reported history of congestive heart failure, type  unknown.  8.  General debility.  9.  Dementia.  10.  Renal insufficiency.     Recommendation Discussion  1.  Normal functioning single-chamber ventricular pacemaker.  2.  No additional cardiac recommendations.  3.  Follow-up arrangements made for pacemaker clinic in EP clinic appointment  4.  Will sign off    Thank you for allowing me to participate in the care of this patient.    Please contact me with any questions.    Jay Robles M.D.   Cardiologist, [facility]  [phone]

## 2019-06-22 NOTE — PROGRESS NOTES
Patient woke up and pulled off her pacemaker dressing. New dry dressing placed and this RN spoke with MD Heller, patient placed in one soft wrist restraint on the right arm, as the left arm is in the sling. Post procedure vital signs underway

## 2019-06-22 NOTE — ANESTHESIA POSTPROCEDURE EVALUATION
Patient: Charmaine Cordova    Procedure Summary     Date:  06/21/19 Room / Location:  [facility] IMAGING - CATH LAB [facility]    Anesthesia Start:  1602 Anesthesia Stop:  1720    Procedure:  CL-PERMANENT PACEMAKER INSERTION Diagnosis:  (See Assoicated Dx)    Scheduled Providers:  Kade Phelps M.D.; Aly Price D.O. Responsible Provider:  Aly Price D.O.    Anesthesia Type:  general ASA Status:  3          Final Anesthesia Type: general  Last vitals  BP   Blood Pressure : 146/59    Temp   36.3 °C (97.4 °F)    Pulse   Pulse: (!) 39   Resp   16    SpO2   98 %      Anesthesia Post Evaluation    Patient location during evaluation: PACU  Patient participation: complete - patient participated  Level of consciousness: awake and alert  Pain score: 0    Airway patency: patent  Anesthetic complications: no  Cardiovascular status: hemodynamically stable  Respiratory status: acceptable  Hydration status: euvolemic    PONV: none           Nurse Pain Score: 0 (NPRS)

## 2019-06-22 NOTE — CARE PLAN
Problem: Knowledge Deficit  Goal: Knowledge of disease process/condition, treatment plan, diagnostic tests, and medications will improve  Outcome: NOT MET  Educated patient on pacemaker placement and procedure for today, patient has dementia and unable to comprehend

## 2019-06-22 NOTE — PROGRESS NOTES
MONITOR SUMMARY    SECOND DEGREE TYPE 2 HR 35-43     DOWN TO 29 NONSUSTAINING     BACK FROM CATH LAB AT 1810, PATIENT IS PACED HR 60     .20/.08/.48

## 2019-06-22 NOTE — PROGRESS NOTES
Assumed care and completed bedside report from MARIBELL Billy. Patient stable. Patient voices no complaints or needs. Telemetry box in place. Bed alarm on. 2/4 bed rails up. Bed in lowest position. Call light within reach.

## 2019-06-23 PROCEDURE — A9270 NON-COVERED ITEM OR SERVICE: HCPCS | Performed by: INTERNAL MEDICINE

## 2019-06-23 PROCEDURE — 700102 HCHG RX REV CODE 250 W/ 637 OVERRIDE(OP): Performed by: INTERNAL MEDICINE

## 2019-06-23 PROCEDURE — 770020 HCHG ROOM/CARE - TELE (206)

## 2019-06-23 PROCEDURE — 99232 SBSQ HOSP IP/OBS MODERATE 35: CPT | Performed by: INTERNAL MEDICINE

## 2019-06-23 PROCEDURE — 700111 HCHG RX REV CODE 636 W/ 250 OVERRIDE (IP): Performed by: INTERNAL MEDICINE

## 2019-06-23 RX ORDER — RISPERIDONE 0.5 MG/1
0.25 TABLET ORAL EVERY EVENING
Status: DISCONTINUED | OUTPATIENT
Start: 2019-06-23 | End: 2019-07-07

## 2019-06-23 RX ORDER — HALOPERIDOL 5 MG/ML
1 INJECTION INTRAMUSCULAR 2 TIMES DAILY PRN
Status: DISCONTINUED | OUTPATIENT
Start: 2019-06-23 | End: 2019-07-19 | Stop reason: HOSPADM

## 2019-06-23 RX ORDER — HALOPERIDOL 1 MG/1
1 TABLET ORAL ONCE
Status: DISCONTINUED | OUTPATIENT
Start: 2019-06-23 | End: 2019-06-23

## 2019-06-23 RX ORDER — HALOPERIDOL 5 MG/1
5 TABLET ORAL ONCE
Status: DISCONTINUED | OUTPATIENT
Start: 2019-06-23 | End: 2019-06-23

## 2019-06-23 RX ORDER — RISPERIDONE 0.5 MG/1
0.25 TABLET ORAL 2 TIMES DAILY
Status: DISCONTINUED | OUTPATIENT
Start: 2019-06-23 | End: 2019-06-23

## 2019-06-23 RX ORDER — HALOPERIDOL 2 MG/ML
1 SOLUTION ORAL ONCE
Status: DISCONTINUED | OUTPATIENT
Start: 2019-06-23 | End: 2019-06-23

## 2019-06-23 RX ADMIN — HEPARIN SODIUM 5000 UNITS: 5000 INJECTION, SOLUTION INTRAVENOUS; SUBCUTANEOUS at 14:18

## 2019-06-23 RX ADMIN — HEPARIN SODIUM 5000 UNITS: 5000 INJECTION, SOLUTION INTRAVENOUS; SUBCUTANEOUS at 05:45

## 2019-06-23 RX ADMIN — LEVOTHYROXINE SODIUM 150 MCG: 75 TABLET ORAL at 05:46

## 2019-06-23 RX ADMIN — ACETAMINOPHEN 650 MG: 325 TABLET, FILM COATED ORAL at 17:08

## 2019-06-23 RX ADMIN — TRAZODONE HYDROCHLORIDE 50 MG: 50 TABLET ORAL at 22:48

## 2019-06-23 RX ADMIN — SENNOSIDES AND DOCUSATE SODIUM 2 TABLET: 8.6; 5 TABLET ORAL at 05:46

## 2019-06-23 RX ADMIN — ACETAMINOPHEN 650 MG: 325 TABLET, FILM COATED ORAL at 22:48

## 2019-06-23 RX ADMIN — RISPERIDONE 0.25 MG: 0.5 TABLET ORAL at 17:09

## 2019-06-23 RX ADMIN — ACETAMINOPHEN 650 MG: 325 TABLET, FILM COATED ORAL at 11:15

## 2019-06-23 RX ADMIN — CITALOPRAM HYDROBROMIDE 20 MG: 20 TABLET ORAL at 05:45

## 2019-06-23 RX ADMIN — SENNOSIDES AND DOCUSATE SODIUM 2 TABLET: 8.6; 5 TABLET ORAL at 17:08

## 2019-06-23 RX ADMIN — ASPIRIN 81 MG 81 MG: 81 TABLET ORAL at 05:46

## 2019-06-23 RX ADMIN — HALOPERIDOL LACTATE 1 MG: 5 INJECTION, SOLUTION INTRAMUSCULAR at 18:13

## 2019-06-23 NOTE — PROGRESS NOTES
2 RN skin check completed with MARIBELL Merrill  Devices in place left arm sling, glasses, oxygen, right arm soft wrist restraint  Skin assessed under devices on  Confirmed pressure ulcers found on N/A.  New potential pressure ulcers noted on N/A.  The following interventions in place; turn Q 2, barrier cream, barrier wipes, float heels, waffle mattress.      Skin was assessed fpr abnormalities head to toe and noted in detail skin note.       FACE/NECK: intact     BACK of HEAD: 2 staples on right side of the crown. Well approximated. CDI.      EARS: pink and blanching      CHEST: scattered bruising. Large bruise mid to left chest. Pacemaker dressing intact. Skin irritated around incision due to Freq removal of dressing by patient     ABDOMEN: scattered bruising     BACK: scattered bruising. Large bruise and small skin tear on left upper back     BUTTOCKS/SACRUM: red and blanching     ARMS: scattered bruising. Skin tear left arm below sling     LEGS: discolored, dry, flaking.      FEET: heels red and slow to paty.

## 2019-06-23 NOTE — PROGRESS NOTES
Assumed care of pt, she is alert and orientedx4/denies pain. She is increasingly agitated. Discussed POC with NOC RN. BL hands in restraints and L is in sling from pacer placement. Discussed safety with pt. Bed is locked in lowest position and call light/personal belongings are within reach.

## 2019-06-23 NOTE — CARE PLAN
Problem: Urinary Elimination:  Goal: Ability to reestablish a normal urinary elimination pattern will improve  Outcome: PROGRESSING SLOWER THAN EXPECTED      Problem: Psychosocial Needs:  Goal: Level of anxiety will decrease  Outcome: PROGRESSING AS EXPECTED

## 2019-06-23 NOTE — PROGRESS NOTES
2 RN skin check complete with Piero RN  Devices in place Silicone NC, brace across chest, glasses, hearing aids, lap belt .  Skin assessed under devices yes.  Confirmed pressure ulcers found on n/a.  New potential pressure ulcers noted on n/a.   The following interventions in place: Q2 turns, O2 tubing offloaded, freq bed changes for incontinence, heels floated on a pillow, waffle mattress, moisturizer, foam dressing.     Head has a wound with 2 stables, c/d/i  BL ears pink/blanching  BL UE severe bruising/ 1 heeling skin tear on each  BL elbows pink/blanching  Sacrum pink/blanching  BL LE dry/flaky/dusky  BL heels red/slow to paty     Chest is very bruised/fragile/blisters. Pictures taken and uploaded 6/22/2019.      Using cover-roll and dry gauze.  Per NOC RN report, pt continued to take off dressing. As of today, no new dressing is needed.

## 2019-06-24 ENCOUNTER — APPOINTMENT (OUTPATIENT)
Dept: RADIOLOGY | Facility: MEDICAL CENTER | Age: 84
DRG: 243 | End: 2019-06-24
Attending: INTERNAL MEDICINE
Payer: MEDICARE

## 2019-06-24 ENCOUNTER — PATIENT OUTREACH (OUTPATIENT)
Dept: HEALTH INFORMATION MANAGEMENT | Facility: OTHER | Age: 84
End: 2019-06-24

## 2019-06-24 LAB
ANION GAP SERPL CALC-SCNC: 5 MMOL/L (ref 0–11.9)
BASOPHILS # BLD AUTO: 0.9 % (ref 0–1.8)
BASOPHILS # BLD: 0.05 K/UL (ref 0–0.12)
BUN SERPL-MCNC: 18 MG/DL (ref 8–22)
CALCIUM SERPL-MCNC: 8.9 MG/DL (ref 8.5–10.5)
CHLORIDE SERPL-SCNC: 103 MMOL/L (ref 96–112)
CO2 SERPL-SCNC: 28 MMOL/L (ref 20–33)
CREAT SERPL-MCNC: 0.84 MG/DL (ref 0.5–1.4)
EOSINOPHIL # BLD AUTO: 0.08 K/UL (ref 0–0.51)
EOSINOPHIL NFR BLD: 1.5 % (ref 0–6.9)
ERYTHROCYTE [DISTWIDTH] IN BLOOD BY AUTOMATED COUNT: 45 FL (ref 35.9–50)
GLUCOSE SERPL-MCNC: 94 MG/DL (ref 65–99)
HCT VFR BLD AUTO: 37.6 % (ref 37–47)
HGB BLD-MCNC: 12.1 G/DL (ref 12–16)
IMM GRANULOCYTES # BLD AUTO: 0.04 K/UL (ref 0–0.11)
IMM GRANULOCYTES NFR BLD AUTO: 0.8 % (ref 0–0.9)
LYMPHOCYTES # BLD AUTO: 1.01 K/UL (ref 1–4.8)
LYMPHOCYTES NFR BLD: 19.1 % (ref 22–41)
MCH RBC QN AUTO: 30 PG (ref 27–33)
MCHC RBC AUTO-ENTMCNC: 32.2 G/DL (ref 33.6–35)
MCV RBC AUTO: 93.1 FL (ref 81.4–97.8)
MONOCYTES # BLD AUTO: 0.66 K/UL (ref 0–0.85)
MONOCYTES NFR BLD AUTO: 12.5 % (ref 0–13.4)
NEUTROPHILS # BLD AUTO: 3.46 K/UL (ref 2–7.15)
NEUTROPHILS NFR BLD: 65.2 % (ref 44–72)
NRBC # BLD AUTO: 0 K/UL
NRBC BLD-RTO: 0 /100 WBC
PLATELET # BLD AUTO: 241 K/UL (ref 164–446)
PMV BLD AUTO: 9.7 FL (ref 9–12.9)
POTASSIUM SERPL-SCNC: 4.4 MMOL/L (ref 3.6–5.5)
RBC # BLD AUTO: 4.04 M/UL (ref 4.2–5.4)
SODIUM SERPL-SCNC: 136 MMOL/L (ref 135–145)
WBC # BLD AUTO: 5.3 K/UL (ref 4.8–10.8)

## 2019-06-24 PROCEDURE — 85025 COMPLETE CBC W/AUTO DIFF WBC: CPT

## 2019-06-24 PROCEDURE — 770020 HCHG ROOM/CARE - TELE (206)

## 2019-06-24 PROCEDURE — 700102 HCHG RX REV CODE 250 W/ 637 OVERRIDE(OP): Performed by: INTERNAL MEDICINE

## 2019-06-24 PROCEDURE — 97162 PT EVAL MOD COMPLEX 30 MIN: CPT

## 2019-06-24 PROCEDURE — 36415 COLL VENOUS BLD VENIPUNCTURE: CPT

## 2019-06-24 PROCEDURE — A9270 NON-COVERED ITEM OR SERVICE: HCPCS | Performed by: INTERNAL MEDICINE

## 2019-06-24 PROCEDURE — 700111 HCHG RX REV CODE 636 W/ 250 OVERRIDE (IP): Performed by: INTERNAL MEDICINE

## 2019-06-24 PROCEDURE — 71045 X-RAY EXAM CHEST 1 VIEW: CPT

## 2019-06-24 PROCEDURE — 92610 EVALUATE SWALLOWING FUNCTION: CPT

## 2019-06-24 PROCEDURE — 97535 SELF CARE MNGMENT TRAINING: CPT

## 2019-06-24 PROCEDURE — 80048 BASIC METABOLIC PNL TOTAL CA: CPT

## 2019-06-24 PROCEDURE — 99232 SBSQ HOSP IP/OBS MODERATE 35: CPT | Performed by: INTERNAL MEDICINE

## 2019-06-24 RX ADMIN — RISPERIDONE 0.25 MG: 0.5 TABLET ORAL at 18:00

## 2019-06-24 RX ADMIN — ACETAMINOPHEN 650 MG: 325 TABLET, FILM COATED ORAL at 05:44

## 2019-06-24 RX ADMIN — HALOPERIDOL LACTATE 1 MG: 5 INJECTION, SOLUTION INTRAMUSCULAR at 06:11

## 2019-06-24 RX ADMIN — HEPARIN SODIUM 5000 UNITS: 5000 INJECTION, SOLUTION INTRAVENOUS; SUBCUTANEOUS at 13:54

## 2019-06-24 RX ADMIN — SENNOSIDES AND DOCUSATE SODIUM 2 TABLET: 8.6; 5 TABLET ORAL at 18:01

## 2019-06-24 RX ADMIN — TRAZODONE HYDROCHLORIDE 50 MG: 50 TABLET ORAL at 22:57

## 2019-06-24 RX ADMIN — CITALOPRAM HYDROBROMIDE 20 MG: 20 TABLET ORAL at 05:41

## 2019-06-24 RX ADMIN — ACETAMINOPHEN 650 MG: 325 TABLET, FILM COATED ORAL at 12:23

## 2019-06-24 RX ADMIN — LEVOTHYROXINE SODIUM 150 MCG: 75 TABLET ORAL at 05:47

## 2019-06-24 RX ADMIN — ASPIRIN 81 MG 81 MG: 81 TABLET ORAL at 06:00

## 2019-06-24 ASSESSMENT — COGNITIVE AND FUNCTIONAL STATUS - GENERAL
MOVING FROM LYING ON BACK TO SITTING ON SIDE OF FLAT BED: A LITTLE
SUGGESTED CMS G CODE MODIFIER MOBILITY: CK
CLIMB 3 TO 5 STEPS WITH RAILING: A LITTLE
MOBILITY SCORE: 18
TURNING FROM BACK TO SIDE WHILE IN FLAT BAD: A LITTLE
MOVING TO AND FROM BED TO CHAIR: A LITTLE
WALKING IN HOSPITAL ROOM: A LITTLE
STANDING UP FROM CHAIR USING ARMS: A LITTLE

## 2019-06-24 ASSESSMENT — GAIT ASSESSMENTS
DISTANCE (FEET): 15
ASSISTIVE DEVICE: FRONT WHEEL WALKER
GAIT LEVEL OF ASSIST: CONTACT GUARD ASSIST
DEVIATION: BRADYKINETIC;SHUFFLED GAIT;DECREASED BASE OF SUPPORT

## 2019-06-24 NOTE — PROGRESS NOTES
Pt is increasingly confused. She has ripped off her dressing for her pacemaker causing a skin tear. Granddaughter is currently at bedside and pt is still agitated and confused. Paged Dr. Carcamo to initiate restraint orders. BL wrist restraints applied and discussed daughter at beside.

## 2019-06-24 NOTE — PROGRESS NOTES
.2 RN skin check complete Bianca RN.   Devices in place oxygen, bilat wrist restraints.  Skin assessed under devices yes.  Confirmed pressure ulcers found on N/A.  New potential pressure ulcers noted on N/A. Wound consult placed N/A.  The following interventions in place pt on waffle cushion, q2hr turns, foam pads on elbows, grey foam on oxygen tubing, heels floating on pillow    Staples to back of head, CDI  BL ears red and blanching, grey foam in place  Bruising to BL UE, skin tears to each  Bilat elbows red and blanching, foam applied  Sacrum pink and blanching  Bilat LE dry, flaky, dusky  Bilat heels red and slow to paty, heels floating on pillow  Large bruise to chest/neck, pacemaker put in place *

## 2019-06-24 NOTE — DISCHARGE PLANNING
"Spoke with Criss at [facility] [phone]. Criss stated that they are not comfortable accepting the patient back due to her fresh wound. Criss stated that the patient is a \"\" and they do not use restraints in their facility. MD notified.  "

## 2019-06-24 NOTE — PROGRESS NOTES
Assumed care of pt, she is alert and oriented to self, and place/denies pain. She is increasingly agitated. Discussed POC with NOC RN. BL hands in restraints and L is in sling from pacer placement. Discussed safety with pt. Bed is locked in lowest position and call light/personal belongings are within reach

## 2019-06-24 NOTE — PROGRESS NOTES
[facility] Medicine Daily Progress Note    Date of Service  6/24/2019    Chief Complaint  89 y.o. 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 Cardiology consulted. Echocardiogram unremarkable. Discussion was done with her son who is power of  and agreeable for pacemaker placement.        Interval Problem Update  6/22. p patient has been pleasantly confused.  Patient has dementia at baseline.  Patient overall has no significant acute event.  Remains stable and complains of general fatigue and body achiness. Patient's pain is general, 2-3/10, intermittent and does not radiate to other location, sharp and with some tingling. Can be controlled by pain meds.   6/23. Patient became more more agitated during the day.  Patient required Haldol and for agitation.  Patient otherwise is very confused and unable to finish review of system.  Patient has no fever, chills, nausea vomiting diarrhea observed overnight.  6/24. Patient is still very delirious.  Patient is still confused.  Speech evaluation showing patient is able to take p.o.  Patient complains of general body achiness. Patient's pain is general 2-3/10, intermittent and does not radiate to other location, sharp and with some tingling. Can be controlled by pain meds.     Consultants/Specialty  Cardiology     Code Status  Full code     Disposition  Back to patient assisted living when bed available    Review of Systems  Review of Systems   Unable to perform ROS: Dementia        Physical Exam  Temp:  [35.9 °C (96.6 °F)-36.5 °C (97.7 °F)] 36.5 °C (97.7 °F)  Pulse:  [60-61] 60  Resp:  [16-20] 20  BP: (126-143)/(50-84) 133/52  SpO2:  [95 %-98 %] 97 %    Physical Exam   Constitutional: No distress.   Frail, hard of hearing.    HENT:    Head: Normocephalic and atraumatic.   Nose: Nose normal.   Mouth/Throat: No oropharyngeal exudate.   Eyes: Conjunctivae and EOM are normal. Right eye exhibits no discharge. Left eye exhibits no discharge.   Neck: Normal range of motion. Neck supple. No JVD present. No thyromegaly present.   Cardiovascular: Normal rate and regular rhythm.  Exam reveals no gallop and no friction rub.    Murmur heard.   Systolic murmur is present with a grade of 3/6   Pulmonary/Chest: Effort normal and breath sounds normal. No respiratory distress. She has no wheezes. She exhibits no tenderness.   Abdominal: Soft. Bowel sounds are normal. She exhibits no distension and no mass. There is no tenderness. There is no guarding.   Musculoskeletal: Normal range of motion. She exhibits no edema or tenderness.   Lymphadenopathy:     She has no cervical adenopathy.   Neurological: She is alert. No cranial nerve deficit. Coordination normal.   Hard of hearing. Cranial nerve grossly intact    Skin: Skin is warm. No rash noted. She is not diaphoretic. No erythema.   Multiple bruises   Psychiatric: Her behavior is normal.       Fluids    Intake/Output Summary (Last 24 hours) at 06/24/19 1641  Last data filed at 06/24/19 0502   Gross per 24 hour   Intake                0 ml   Output              500 ml   Net             -500 ml       Laboratory  Recent Labs      06/22/19   0623  06/24/19   0014   WBC  5.7  5.3   RBC  4.58  4.04*   HEMOGLOBIN  13.5  12.1   HEMATOCRIT  44.2  37.6   MCV  96.5  93.1   MCH  29.5  30.0   MCHC  30.5*  32.2*   RDW  45.7  45.0   PLATELETCT  208  241   MPV  9.3  9.7     Recent Labs      06/22/19   0623  06/24/19   0014   SODIUM  136  136   POTASSIUM  4.9  4.4   CHLORIDE  104  103   CO2  22  28   GLUCOSE  100*  94   BUN  16  18   CREATININE  0.94  0.84   CALCIUM  8.6  8.9                   Imaging  DX-CHEST-LIMITED (1 VIEW)   Final Result      1.  There is slight improvement in changes of interstitial edema with trace amount  of pleural fluid.   2.  Pacemaker lead projects over the cardiac silhouette. There is no visible pneumothorax.      DX-CHEST-PORTABLE (1 VIEW)   Final Result         1.  Pulmonary edema and/or infiltrates are identified, which appear somewhat increased since the prior exam.   2.  Atherosclerosis      DX-CHEST-PORTABLE (1 VIEW)   Final Result      Mild worsening reticular opacification may indicate edema      New left pacer with no evidence of pneumothorax      EC-ECHOCARDIOGRAM COMPLETE W/O CONT   Final Result      DX-CHEST-PORTABLE (1 VIEW)   Final Result      No acute cardiac or pulmonary abnormality is noted.      CL-PERMANENT PACEMAKER INSERTION    (Results Pending)        Assessment/Plan  AV block- (present on admission)   Assessment & Plan    Pacer implantation 6/21  Echo unremarkable   Syncope on admission currently resolved  Follow-up as outpatient as needed in pacer clinic.  Patient wound showing ecchymosis, continue to do wound care     Normocytic anemia- (present on admission)   Assessment & Plan    No acute bleeding  Iron panel/b12/ferritin/folic acid to follow  HH stable      Syncopal episodes- (present on admission)   Assessment & Plan    Seems to cardiogenic reason. Continue tele monitoring at this time   resolved     Stage 3 chronic kidney disease (HCC)- (present on admission)   Assessment & Plan    She has history of chronic kidney disease.  Avoid nephrotoxins per  Medication doses as per renal function.  Continue to monitor  Patient's kidney function has been stable, creatinine 0.94  Close monitor able to take p.o.   Hypothyroidism- (present on admission)   Assessment & Plan    TSH low/free t4 elevated  I did decrease levothyroxine 175---> 150 mcg daily. High risk of over correction of her thyroid on her age   Continue to monitor      Dementia:  -Patient has severe underlying dementia  Patient become very delirious during the day.  Very agitated during the daytime, require Im Haldol 1 mg as  needed  One-to-one sitter  Difficult discharge,,  to help    VTE prophylaxis: heparin       Current Facility-Administered Medications:   •  haloperidol lactate (HALDOL) injection 1 mg, 1 mg, Intramuscular, BID PRN, Lizzy Carcamo M.D., 1 mg at 06/24/19 0611  •  risperiDONE (RISPERDAL) tablet 0.25 mg, 0.25 mg, Oral, Q EVENING, Lizzy Carcamo M.D., 0.25 mg at 06/23/19 1709  •  levothyroxine (SYNTHROID) tablet 150 mcg, 150 mcg, Oral, AM ES, Nichole Heller M.D., 150 mcg at 06/24/19 0547  •  aspirin (ASA) chewable tab 81 mg, 81 mg, Oral, DAILY, Leanne Poe M.D., 81 mg at 06/24/19 0600  •  citalopram (CELEXA) tablet 20 mg, 20 mg, Oral, DAILY, Leanne Poe M.D., 20 mg at 06/24/19 0541  •  traZODone (DESYREL) tablet 50 mg, 50 mg, Oral, HS PRN, Leanne Poe M.D., 50 mg at 06/23/19 2248  •  senna-docusate (PERICOLACE or SENOKOT S) 8.6-50 MG per tablet 2 Tab, 2 Tab, Oral, BID, 2 Tab at 06/23/19 1708 **AND** polyethylene glycol/lytes (MIRALAX) PACKET 1 Packet, 1 Packet, Oral, QDAY PRN **AND** magnesium hydroxide (MILK OF MAGNESIA) suspension 30 mL, 30 mL, Oral, QDAY PRN **AND** bisacodyl (DULCOLAX) suppository 10 mg, 10 mg, Rectal, QDAY PRN, Leanne Poe M.D.  •  Respiratory Care per Protocol, , Nebulization, Continuous RT, Leanne Poe M.D.  •  heparin injection 5,000 Units, 5,000 Units, Subcutaneous, Q8HRS, Leanne Poe M.D., 5,000 Units at 06/24/19 1354  •  acetaminophen (TYLENOL) tablet 650 mg, 650 mg, Oral, Q6HRS PRN, Leanne Poe M.D., 650 mg at 06/24/19 0919

## 2019-06-24 NOTE — THERAPY
"Speech Language Therapy Clinical Swallow Evaluation completed.    Functional Status: Patient was seen on this date for a clinical swallow evaluation. Per RN, patient tolerated medications whole with liquid wash without difficulty. Patient sitting out of bed in a chair. Patient very [place] and AAO to self only. Spoke with daughter earlier in the day who reported that patient generally eats without difficulty but will frequently display trouble with rice. Oral motor examination revealed no gross asymmetry or weakness. PO trials were administered and consisted of single ice chips, NTL, puree, yogurt, soft solids, and thin liquids. Swallow trigger timely and laryngeal elevation age appropriate. No overt s/sx of aspiration appreciated across all PO trials tested. Mastication slightly prolonged but appeared functional for soft textures. Patient required min A with feeding but moderate encouragement to consume PO trials.     Recommendations - Diet: At this time, patient appears at the level for a D2/thin liquid diet given direct supervision/assistance with feeding as needed. SLP following closely.                             Strategies: Direct supervision during meals and Head of Bed at 90 Degrees                            Medication Administration: Whole with Liquid Wash (or as tolerated)    Plan of Care: Will benefit from Speech Therapy 3 times per week    Post-Acute Therapy: Recommend inpatient transitional care services for continued speech therapy services.      See \"Rehab Therapy-Acute\" Patient Summary Report for complete documentation. Thank you for the consult.           "

## 2019-06-24 NOTE — PROGRESS NOTES
Hospital Medicine Daily Progress Note    Date of Service  6/23/2019    Chief Complaint  89 y.o. 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 Cardiology consulted. Echocardiogram unremarkable. Discussion was done with her son who is power of  and agreeable for pacemaker placement.        Interval Problem Update  6/22. p patient has been pleasantly confused.  Patient has dementia at baseline.  Patient overall has no significant acute event.  Remains stable and complains of general fatigue and body achiness. Patient's pain is general, 2-3/10, intermittent and does not radiate to other location, sharp and with some tingling. Can be controlled by pain meds.   6/23. Patient became more more agitated during the day.  Patient required Haldol and for agitation.  Patient otherwise is very confused and unable to finish review of system.  Patient has no fever, chills, nausea vomiting diarrhea observed overnight.    Consultants/Specialty  Cardiology     Code Status  Full code     Disposition  Back to patient assisted living when bed available    Review of Systems  Review of Systems   Unable to perform ROS: Dementia        Physical Exam  Temp:  [36.1 °C (97 °F)-36.4 °C (97.6 °F)] 36.4 °C (97.6 °F)  Pulse:  [60-62] 62  Resp:  [16-20] 16  BP: (100-148)/(53-68) 148/56  SpO2:  [95 %-98 %] 95 %    Physical Exam   Constitutional: She appears well-nourished.   Frail, hard of hearing.    HENT:   Head: Normocephalic and atraumatic.   Nose: Nose normal.   Mouth/Throat: No oropharyngeal exudate.   Eyes: Conjunctivae and EOM are normal. Right eye exhibits no discharge. Left eye exhibits no discharge.   Neck: Normal range of motion. Neck supple. No JVD present. No thyromegaly present.    Cardiovascular: Normal rate and regular rhythm.  Exam reveals no gallop and no friction rub.    Murmur heard.   Systolic murmur is present with a grade of 3/6   Pulmonary/Chest: Effort normal and breath sounds normal. No respiratory distress. She has no wheezes. She exhibits no tenderness.   Abdominal: Soft. Bowel sounds are normal. She exhibits no distension and no mass. There is no rebound and no guarding.   Musculoskeletal: Normal range of motion. She exhibits no edema or tenderness.   Lymphadenopathy:     She has no cervical adenopathy.   Neurological: She is alert. No cranial nerve deficit.   Hard of hearing. Cranial nerve grossly intact    Skin: Skin is warm. No rash noted.   Multiple bruises   Psychiatric: Her behavior is normal.       Fluids    Intake/Output Summary (Last 24 hours) at 06/23/19 1717  Last data filed at 06/23/19 1200   Gross per 24 hour   Intake                0 ml   Output              650 ml   Net             -650 ml       Laboratory  Recent Labs      06/21/19 0221 06/22/19   0623   WBC  6.4  5.7   RBC  3.92*  4.58   HEMOGLOBIN  11.9*  13.5   HEMATOCRIT  36.8*  44.2   MCV  93.9  96.5   MCH  30.4  29.5   MCHC  32.3*  30.5*   RDW  45.3  45.7   PLATELETCT  252  208   MPV  9.4  9.3     Recent Labs      06/21/19   0221 06/22/19   0623   SODIUM  138  136   POTASSIUM  3.9  4.9   CHLORIDE  105  104   CO2  27  22   GLUCOSE  86  100*   BUN  17  16   CREATININE  1.23  0.94   CALCIUM  8.5  8.6                   Imaging  DX-CHEST-PORTABLE (1 VIEW)   Final Result         1.  Pulmonary edema and/or infiltrates are identified, which appear somewhat increased since the prior exam.   2.  Atherosclerosis      DX-CHEST-PORTABLE (1 VIEW)   Final Result      Mild worsening reticular opacification may indicate edema      New left pacer with no evidence of pneumothorax      EC-ECHOCARDIOGRAM COMPLETE W/O CONT   Final Result      DX-CHEST-PORTABLE (1 VIEW)   Final Result      No acute cardiac or pulmonary  abnormality is noted.      CL-PERMANENT PACEMAKER INSERTION    (Results Pending)        Assessment/Plan  AV block- (present on admission)   Assessment & Plan    Pacer implantation 6/21  Echo unremarkable   Syncope on admission currently resolved  Follow-up as outpatient as needed in pacer clinic.     Normocytic anemia- (present on admission)   Assessment & Plan    No acute bleeding  Iron panel/b12/ferritin/folic acid to follow  HH stable      Syncopal episodes- (present on admission)   Assessment & Plan    Seems to cardiogenic reason. Continue tele monitoring at this time   resolved     Stage 3 chronic kidney disease (HCC)- (present on admission)   Assessment & Plan    She has history of chronic kidney disease.  Avoid nephrotoxins per  Medication doses as per renal function.  Continue to monitor  Patient's kidney function has been stable, creatinine 0.94   Hypothyroidism- (present on admission)   Assessment & Plan    TSH low/free t4 elevated  I did decrease levothyroxine 175---> 150 mcg daily. High risk of over correction of her thyroid on her age   Continue to monitor      Dementia:  -Patient has severe underlying dementia  Patient become very delirious during the day.  Very agitated during the daytime, require Im Haldol 1 mg as needed  One-to-one sitter    VTE prophylaxis: heparin       Current Facility-Administered Medications:   •  haloperidol lactate (HALDOL) injection 1 mg, 1 mg, Intramuscular, BID PRN, Lizzy Carcamo M.D.  •  risperiDONE (RISPERDAL) tablet 0.25 mg, 0.25 mg, Oral, Q EVENING, Lizzy Carcamo M.D., 0.25 mg at 06/23/19 1709  •  levothyroxine (SYNTHROID) tablet 150 mcg, 150 mcg, Oral, AM ES, Nichole Heller M.D., 150 mcg at 06/23/19 0546  •  lactated ringers infusion, , Intravenous, Continuous, Aly Price D.O., Stopped at 06/22/19 0500  •  aspirin (ASA) chewable tab 81 mg, 81 mg, Oral, DAILY, Leanne Poe M.D., 81 mg at 06/23/19 0546  •  citalopram (CELEXA) tablet 20 mg, 20 mg, Oral, DAILY,  Leanne Poe M.D., 20 mg at 06/23/19 0545  •  traZODone (DESYREL) tablet 50 mg, 50 mg, Oral, HS PRN, Leanne Poe M.D., 50 mg at 06/22/19 2055  •  senna-docusate (PERICOLACE or SENOKOT S) 8.6-50 MG per tablet 2 Tab, 2 Tab, Oral, BID, 2 Tab at 06/23/19 1708 **AND** polyethylene glycol/lytes (MIRALAX) PACKET 1 Packet, 1 Packet, Oral, QDAY PRN **AND** magnesium hydroxide (MILK OF MAGNESIA) suspension 30 mL, 30 mL, Oral, QDAY PRN **AND** bisacodyl (DULCOLAX) suppository 10 mg, 10 mg, Rectal, QDAY PRN, Leanne Poe M.D.  •  Respiratory Care per Protocol, , Nebulization, Continuous RT, Leanne Poe M.D.  •  NS infusion 1,000 mL, 1,000 mL, Intravenous, Continuous, Leanne Poe M.D., Last Rate: 75 mL/hr at 06/20/19 2346, 1,000 mL at 06/20/19 2346  •  heparin injection 5,000 Units, 5,000 Units, Subcutaneous, Q8HRS, Leanne Poe M.D., 5,000 Units at 06/23/19 1418  •  acetaminophen (TYLENOL) tablet 650 mg, 650 mg, Oral, Q6HRS PRN, Leanne Poe M.D., 650 mg at 06/23/19 1708

## 2019-06-24 NOTE — THERAPY
"Physical Therapy Evaluation completed.   Bed Mobility:  Supine to Sit:  (up in a chair)  Transfers: Sit to Stand: Contact Guard Assist  Gait: Level Of Assist: Contact Guard Assist with Front-Wheel Walker       Plan of Care: Will benefit from Physical Therapy 1 times per week  Discharge Recommendations: Equipment: No Equipment Needed.     See \"Rehab Therapy-Acute\" Patient Summary Report for complete documentation.       Pt is a 89 y/o female presenting to acute setting from outside hospital for A-V block questionable for Mobitz II for pacemaker placement. Pt with dementia at baseline and is extremely [place] despite devices. Pt had difficulty following single step commands due to both of these factors, and continued to cry out \"help\" throughout session. When asked what was wrong, she stated: \"nothing, im ok.\" Anticipate Pt is at or very close to her functional baseline. Per the EMR, she was residing at a 24-hour memory care based facility but that she did ambulate short distances with a FWW. PT to follow Pt on a monitor status only to determine Pts actual PLOF as she is unable to relay this info. Recommend Pt return to a 24/7 care setting   "

## 2019-06-25 LAB
ANION GAP SERPL CALC-SCNC: 4 MMOL/L (ref 0–11.9)
BASOPHILS # BLD AUTO: 0.9 % (ref 0–1.8)
BASOPHILS # BLD: 0.06 K/UL (ref 0–0.12)
BUN SERPL-MCNC: 13 MG/DL (ref 8–22)
CALCIUM SERPL-MCNC: 9.1 MG/DL (ref 8.5–10.5)
CHLORIDE SERPL-SCNC: 104 MMOL/L (ref 96–112)
CO2 SERPL-SCNC: 27 MMOL/L (ref 20–33)
CREAT SERPL-MCNC: 0.75 MG/DL (ref 0.5–1.4)
EOSINOPHIL # BLD AUTO: 0.03 K/UL (ref 0–0.51)
EOSINOPHIL NFR BLD: 0.5 % (ref 0–6.9)
ERYTHROCYTE [DISTWIDTH] IN BLOOD BY AUTOMATED COUNT: 45.4 FL (ref 35.9–50)
GLUCOSE SERPL-MCNC: 88 MG/DL (ref 65–99)
HCT VFR BLD AUTO: 40.7 % (ref 37–47)
HGB BLD-MCNC: 13 G/DL (ref 12–16)
IMM GRANULOCYTES # BLD AUTO: 0.04 K/UL (ref 0–0.11)
IMM GRANULOCYTES NFR BLD AUTO: 0.6 % (ref 0–0.9)
LYMPHOCYTES # BLD AUTO: 0.98 K/UL (ref 1–4.8)
LYMPHOCYTES NFR BLD: 14.8 % (ref 22–41)
MCH RBC QN AUTO: 30 PG (ref 27–33)
MCHC RBC AUTO-ENTMCNC: 31.9 G/DL (ref 33.6–35)
MCV RBC AUTO: 93.8 FL (ref 81.4–97.8)
MONOCYTES # BLD AUTO: 0.87 K/UL (ref 0–0.85)
MONOCYTES NFR BLD AUTO: 13.2 % (ref 0–13.4)
NEUTROPHILS # BLD AUTO: 4.62 K/UL (ref 2–7.15)
NEUTROPHILS NFR BLD: 70 % (ref 44–72)
NRBC # BLD AUTO: 0 K/UL
NRBC BLD-RTO: 0 /100 WBC
PLATELET # BLD AUTO: 236 K/UL (ref 164–446)
PMV BLD AUTO: 9.3 FL (ref 9–12.9)
POTASSIUM SERPL-SCNC: 4.1 MMOL/L (ref 3.6–5.5)
RBC # BLD AUTO: 4.34 M/UL (ref 4.2–5.4)
SODIUM SERPL-SCNC: 135 MMOL/L (ref 135–145)
WBC # BLD AUTO: 6.6 K/UL (ref 4.8–10.8)

## 2019-06-25 PROCEDURE — 99232 SBSQ HOSP IP/OBS MODERATE 35: CPT | Performed by: INTERNAL MEDICINE

## 2019-06-25 PROCEDURE — 700102 HCHG RX REV CODE 250 W/ 637 OVERRIDE(OP): Performed by: INTERNAL MEDICINE

## 2019-06-25 PROCEDURE — A9270 NON-COVERED ITEM OR SERVICE: HCPCS | Performed by: INTERNAL MEDICINE

## 2019-06-25 PROCEDURE — 85025 COMPLETE CBC W/AUTO DIFF WBC: CPT

## 2019-06-25 PROCEDURE — 700111 HCHG RX REV CODE 636 W/ 250 OVERRIDE (IP): Performed by: INTERNAL MEDICINE

## 2019-06-25 PROCEDURE — 770020 HCHG ROOM/CARE - TELE (206)

## 2019-06-25 PROCEDURE — 36415 COLL VENOUS BLD VENIPUNCTURE: CPT

## 2019-06-25 PROCEDURE — 80048 BASIC METABOLIC PNL TOTAL CA: CPT

## 2019-06-25 PROCEDURE — 92526 ORAL FUNCTION THERAPY: CPT

## 2019-06-25 RX ADMIN — ASPIRIN 81 MG 81 MG: 81 TABLET ORAL at 05:10

## 2019-06-25 RX ADMIN — RISPERIDONE 0.25 MG: 0.5 TABLET ORAL at 17:56

## 2019-06-25 RX ADMIN — SENNOSIDES AND DOCUSATE SODIUM 2 TABLET: 8.6; 5 TABLET ORAL at 05:10

## 2019-06-25 RX ADMIN — HEPARIN SODIUM 5000 UNITS: 5000 INJECTION, SOLUTION INTRAVENOUS; SUBCUTANEOUS at 05:13

## 2019-06-25 RX ADMIN — CITALOPRAM HYDROBROMIDE 20 MG: 20 TABLET ORAL at 05:08

## 2019-06-25 RX ADMIN — LEVOTHYROXINE SODIUM 150 MCG: 75 TABLET ORAL at 05:09

## 2019-06-25 ASSESSMENT — PAIN SCALES - PAIN ASSESSMENT IN ADVANCED DEMENTIA (PAINAD)
FACIALEXPRESSION: SMILING OR INEXPRESSIVE
BODYLANGUAGE: RELAXED
BREATHING: NORMAL
TOTALSCORE: 0
CONSOLABILITY: NO NEED TO CONSOLE

## 2019-06-25 NOTE — THERAPY
"Speech Language Therapy dysphagia treatment completed.     Functional Status: Patient was seen on this date for dysphagia treatment. Patient sleeping but awoke easily with verbal and tactile cue x1. Written communication was utilized given significant [place]. Patient sleepy this session and required intermittent verbal and tactile cues to keep eyes open. Patient consumed <50% of items from D2/thin liquid meal tray (scrambled eggs, cream of wheat, and 4 oz thin liquids). Swallow trigger achieved within 2-3 seconds and overt s/sx of aspiration appreciated throughout the course of the meal. Pt required moderate encouragement to consume PO.     Recommendations: continue a D2/thin liquid diet given 1:1 feeding     Plan of Care: Will benefit from Speech Therapy 3 times per week    Post-Acute Therapy: Recommend inpatient transitional care services for continued speech therapy services.      See \"Rehab Therapy-Acute\" Patient Summary Report for complete documentation.     "

## 2019-06-25 NOTE — PROGRESS NOTES
2 RN skin check complete with MARIBELL Valenzuela  Devices in place Silicone NC, BL wrist restraints, glasses, hearing aids  Skin assessed under devices yes.  Confirmed pressure ulcers found on n/a.  New potential pressure ulcers noted on n/a.   The following interventions in place: Q2 turns, O2 tubing offloaded, freq bed changes for incontinence, heels floated on a pillow, waffle mattress, moisturizer, dressings placed on all skin tears.     Head has a wound with 2 stables, c/d/i  Upper back with 1 skin tear  BL ears pink/blanching  BL UE severe bruising/  And scattered skin tears on each  BL elbows pink/blanching  Sacrum pink/blanching  BL LE dry/flaky/dusky  BL heels red/slow to paty     Chest is very bruised/fragile/blisters. Skin tear near pacer incision.   Pictures have been uploaded

## 2019-06-25 NOTE — PROGRESS NOTES
2 RN skin check complete with Jennifer RN  Devices in place Silicone NC, BL wrist restraints, glasses, hearing aids, lap belt .  Skin assessed under devices yes.  Confirmed pressure ulcers found on n/a.  New potential pressure ulcers noted on n/a.   The following interventions in place: Q2 turns, O2 tubing offloaded, freq bed changes for incontinence, heels floated on a pillow, waffle mattress, moisturizer, dressings placed on all skin tears.     Head has a wound with 2 stables, c/d/i  Upper back with 1 skin tear  BL ears pink/blanching  BL UE severe bruising/  And scattered skin tears on each  BL elbows pink/blanching  Sacrum pink/blanching  BL LE dry/flaky/dusky  BL heels red/slow to paty     Chest is very bruised/fragile/blisters. Skin tear near pacer incision.   New pictures taken and uploaded 6/24/2019.        Using cover-roll and dry gauze. Pt continues to get out of restraints and pick at dressing. It was changed on 6/24 by this RN.

## 2019-06-25 NOTE — PROGRESS NOTES
Patient care assumed, bedside report received, POC discussed, verbalizes understanding. Safety measures in place, call light in place. Pt in restraints bilat wrist restraints at this time due to pt continuing to pick at dressing

## 2019-06-26 LAB
ANION GAP SERPL CALC-SCNC: 7 MMOL/L (ref 0–11.9)
BASOPHILS # BLD AUTO: 0.8 % (ref 0–1.8)
BASOPHILS # BLD: 0.06 K/UL (ref 0–0.12)
BUN SERPL-MCNC: 14 MG/DL (ref 8–22)
CALCIUM SERPL-MCNC: 8.9 MG/DL (ref 8.5–10.5)
CHLORIDE SERPL-SCNC: 104 MMOL/L (ref 96–112)
CO2 SERPL-SCNC: 29 MMOL/L (ref 20–33)
CREAT SERPL-MCNC: 0.79 MG/DL (ref 0.5–1.4)
EOSINOPHIL # BLD AUTO: 0.03 K/UL (ref 0–0.51)
EOSINOPHIL NFR BLD: 0.4 % (ref 0–6.9)
ERYTHROCYTE [DISTWIDTH] IN BLOOD BY AUTOMATED COUNT: 44.5 FL (ref 35.9–50)
GLUCOSE SERPL-MCNC: 84 MG/DL (ref 65–99)
HCT VFR BLD AUTO: 39 % (ref 37–47)
HGB BLD-MCNC: 12.6 G/DL (ref 12–16)
IMM GRANULOCYTES # BLD AUTO: 0.05 K/UL (ref 0–0.11)
IMM GRANULOCYTES NFR BLD AUTO: 0.7 % (ref 0–0.9)
LYMPHOCYTES # BLD AUTO: 1.06 K/UL (ref 1–4.8)
LYMPHOCYTES NFR BLD: 14.3 % (ref 22–41)
MCH RBC QN AUTO: 29.7 PG (ref 27–33)
MCHC RBC AUTO-ENTMCNC: 32.3 G/DL (ref 33.6–35)
MCV RBC AUTO: 92 FL (ref 81.4–97.8)
MONOCYTES # BLD AUTO: 0.85 K/UL (ref 0–0.85)
MONOCYTES NFR BLD AUTO: 11.5 % (ref 0–13.4)
NEUTROPHILS # BLD AUTO: 5.37 K/UL (ref 2–7.15)
NEUTROPHILS NFR BLD: 72.3 % (ref 44–72)
NRBC # BLD AUTO: 0 K/UL
NRBC BLD-RTO: 0 /100 WBC
PLATELET # BLD AUTO: 234 K/UL (ref 164–446)
PMV BLD AUTO: 9.6 FL (ref 9–12.9)
POTASSIUM SERPL-SCNC: 4.3 MMOL/L (ref 3.6–5.5)
RBC # BLD AUTO: 4.24 M/UL (ref 4.2–5.4)
SODIUM SERPL-SCNC: 140 MMOL/L (ref 135–145)
WBC # BLD AUTO: 7.4 K/UL (ref 4.8–10.8)

## 2019-06-26 PROCEDURE — 85025 COMPLETE CBC W/AUTO DIFF WBC: CPT

## 2019-06-26 PROCEDURE — 700111 HCHG RX REV CODE 636 W/ 250 OVERRIDE (IP): Performed by: INTERNAL MEDICINE

## 2019-06-26 PROCEDURE — 700102 HCHG RX REV CODE 250 W/ 637 OVERRIDE(OP): Performed by: INTERNAL MEDICINE

## 2019-06-26 PROCEDURE — 99232 SBSQ HOSP IP/OBS MODERATE 35: CPT | Performed by: INTERNAL MEDICINE

## 2019-06-26 PROCEDURE — 80048 BASIC METABOLIC PNL TOTAL CA: CPT

## 2019-06-26 PROCEDURE — 770020 HCHG ROOM/CARE - TELE (206)

## 2019-06-26 PROCEDURE — A9270 NON-COVERED ITEM OR SERVICE: HCPCS | Performed by: INTERNAL MEDICINE

## 2019-06-26 PROCEDURE — 36415 COLL VENOUS BLD VENIPUNCTURE: CPT

## 2019-06-26 RX ADMIN — LEVOTHYROXINE SODIUM 150 MCG: 75 TABLET ORAL at 05:48

## 2019-06-26 RX ADMIN — HEPARIN SODIUM 5000 UNITS: 5000 INJECTION, SOLUTION INTRAVENOUS; SUBCUTANEOUS at 21:22

## 2019-06-26 RX ADMIN — TRAZODONE HYDROCHLORIDE 50 MG: 50 TABLET ORAL at 23:31

## 2019-06-26 RX ADMIN — RISPERIDONE 0.25 MG: 0.5 TABLET ORAL at 16:25

## 2019-06-26 RX ADMIN — ASPIRIN 81 MG 81 MG: 81 TABLET ORAL at 05:49

## 2019-06-26 RX ADMIN — HEPARIN SODIUM 5000 UNITS: 5000 INJECTION, SOLUTION INTRAVENOUS; SUBCUTANEOUS at 13:39

## 2019-06-26 RX ADMIN — HALOPERIDOL LACTATE 1 MG: 5 INJECTION, SOLUTION INTRAMUSCULAR at 13:40

## 2019-06-26 RX ADMIN — CITALOPRAM HYDROBROMIDE 20 MG: 20 TABLET ORAL at 05:49

## 2019-06-26 RX ADMIN — HEPARIN SODIUM 5000 UNITS: 5000 INJECTION, SOLUTION INTRAVENOUS; SUBCUTANEOUS at 05:51

## 2019-06-26 NOTE — CARE PLAN
Problem: Communication  Goal: The ability to communicate needs accurately and effectively will improve  Outcome: PROGRESSING SLOWER THAN EXPECTED      Problem: Safety  Goal: Will remain free from injury  Outcome: PROGRESSING AS EXPECTED      Problem: Infection  Goal: Will remain free from infection  Outcome: PROGRESSING AS EXPECTED

## 2019-06-26 NOTE — PROGRESS NOTES
2 RN skin check complete with MARIBELL Rosas  Devices in place Silicone NC, BL wrist restraints, glasses, hearing aids  Skin assessed under devices yes.  Confirmed pressure ulcers found on n/a.  New potential pressure ulcers noted on n/a.   The following interventions in place: Q2 turns, O2 tubing offloaded, freq bed changes for incontinence, heels floated on a pillow, waffle mattress, moisturizer, dressings placed on all skin tears.     Head has a wound with 2 stables, c/d/i  Upper back with 1 skin tear  BL ears pink/blanching  BL UE severe bruising/  And scattered skin tears on each  BL elbows pink/blanching  Sacrum pink/blanching jarred cream applied  BL LE dry/flaky/dusky  BL heels red/slow to paty     Chest is very bruised/fragile/blisters. Skin tear near pacer incision.   Pictures have been uploaded

## 2019-06-26 NOTE — CARE PLAN
Problem: Infection  Goal: Will remain free from infection  Outcome: PROGRESSING SLOWER THAN EXPECTED  Educated patient on the importance of good hand hygiene for self and staff,no evidence of understanding.

## 2019-06-26 NOTE — PROGRESS NOTES
[facility] Medicine Daily Progress Note    Date of Service  6/25/2019    Chief Complaint  89 y.o. 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 Cardiology consulted. Echocardiogram unremarkable. Discussion was done with her son who is power of  and agreeable for pacemaker placement.        Interval Problem Update  6/22. p patient has been pleasantly confused.  Patient has dementia at baseline.  Patient overall has no significant acute event.  Remains stable and complains of general fatigue and body achiness. Patient's pain is general, 2-3/10, intermittent and does not radiate to other location, sharp and with some tingling. Can be controlled by pain meds.   6/23. Patient became more more agitated during the day.  Patient required Haldol and for agitation.  Patient otherwise is very confused and unable to finish review of system.  Patient has no fever, chills, nausea vomiting diarrhea observed overnight.  6/24. Patient is still very delirious.  Patient is still confused.  Speech evaluation showing patient is able to take p.o.  Patient complains of general body achiness. Patient's pain is general 2-3/10, intermittent and does not radiate to other location, sharp and with some tingling. Can be controlled by pain meds.   6/25.  Patient still confused and patient's left chest pacer wound still not healing well enough.  Patient like to undress the wound by herself without notifying nursing staff.  Concern of wound infection if discharged to assisted living prematurely. Patient otherwise denies fever, chills, nausea, vomiting, adb pain, SOB, CP, headache, constipation, diarrhea, cough, or sputum.      Consultants/Specialty  Cardiology     Code Status  Full code     Disposition  Back  to patient assisted living when pacer wound started healing better    Review of Systems  Review of Systems   Unable to perform ROS: Dementia        Physical Exam  Temp:  [36.1 °C (96.9 °F)-36.7 °C (98 °F)] 36.7 °C (98 °F)  Pulse:  [60-66] 60  Resp:  [16-20] 20  BP: (120-172)/(53-65) 172/57  SpO2:  [95 %-100 %] 95 %    Physical Exam   Constitutional: She appears well-developed. No distress.   Frail, hard of hearing.    HENT:   Head: Normocephalic and atraumatic.   Right Ear: External ear normal.   Left Ear: External ear normal.   Nose: Nose normal.   Mouth/Throat: No oropharyngeal exudate.   Eyes: Conjunctivae and EOM are normal. Right eye exhibits no discharge. Left eye exhibits no discharge.   Neck: Normal range of motion. Neck supple. No thyromegaly present.   Cardiovascular: Normal rate and regular rhythm.  Exam reveals no gallop.    Murmur heard.   Systolic murmur is present with a grade of 3/6   Pulmonary/Chest: Effort normal and breath sounds normal. She has no wheezes. She exhibits no tenderness.   Abdominal: Soft. Bowel sounds are normal. She exhibits no distension and no mass. There is no tenderness. There is no guarding.   Musculoskeletal: Normal range of motion. She exhibits no edema or tenderness.   Lymphadenopathy:     She has no cervical adenopathy.   Neurological: She is alert. Coordination normal.   Hard of hearing. Cranial nerve grossly intact    Skin: Skin is warm. No rash noted. No erythema.   Multiple bruises   Psychiatric: Her behavior is normal.       Fluids  No intake or output data in the 24 hours ending 06/25/19 1722    Laboratory  Recent Labs      06/24/19 0014 06/25/19 0043   WBC  5.3  6.6   RBC  4.04*  4.34   HEMOGLOBIN  12.1  13.0   HEMATOCRIT  37.6  40.7   MCV  93.1  93.8   MCH  30.0  30.0   MCHC  32.2*  31.9*   RDW  45.0  45.4   PLATELETCT  241  236   MPV  9.7  9.3     Recent Labs      06/24/19   0014  06/25/19   0043   SODIUM  136  135   POTASSIUM  4.4  4.1   CHLORIDE  103  104    CO2  28  27   GLUCOSE  94  88   BUN  18  13   CREATININE  0.84  0.75   CALCIUM  8.9  9.1                   Imaging  DX-CHEST-LIMITED (1 VIEW)   Final Result      1.  There is slight improvement in changes of interstitial edema with trace amount of pleural fluid.   2.  Pacemaker lead projects over the cardiac silhouette. There is no visible pneumothorax.      DX-CHEST-PORTABLE (1 VIEW)   Final Result         1.  Pulmonary edema and/or infiltrates are identified, which appear somewhat increased since the prior exam.   2.  Atherosclerosis      DX-CHEST-PORTABLE (1 VIEW)   Final Result      Mild worsening reticular opacification may indicate edema      New left pacer with no evidence of pneumothorax      EC-ECHOCARDIOGRAM COMPLETE W/O CONT   Final Result      DX-CHEST-PORTABLE (1 VIEW)   Final Result      No acute cardiac or pulmonary abnormality is noted.      CL-PERMANENT PACEMAKER INSERTION    (Results Pending)        Assessment/Plan  AV block- (present on admission)   Assessment & Plan    Pacer implantation 6/21  Echo unremarkable   Syncope on admission currently resolved  Follow-up as outpatient as needed in pacer clinic.  Patient wound showing ecchymosis, continue to do wound care  Patient need to be on restrain for further time to prevent self injury to the wound.  Patient may need to stay in the hospital for couple more days with restraint     Normocytic anemia- (present on admission)   Assessment & Plan    No acute bleeding  Iron panel/b12/ferritin/folic acid to follow  HH stable      Syncopal episodes- (present on admission)   Assessment & Plan    Seems to cardiogenic reason. Continue tele monitoring at this time   Resolved  Pacer in place and working appropriately     Stage 3 chronic kidney disease (HCC)- (present on admission)   Assessment & Plan    She has history of chronic kidney disease.  Avoid nephrotoxins per  Medication doses as per renal function.  Continue to monitor  Patient's kidney function has  been stable, creatinine 0.94  Close monitor able to take p.o.     Hypothyroidism- (present on admission)   Assessment & Plan    TSH low/free t4 elevated  I did decrease levothyroxine 175---> 150 mcg daily. High risk of over correction of her thyroid on her age   Continue to monitor      Dementia:  -Patient has severe underlying dementia  Patient become very delirious during the day.  Very agitated during the daytime, require Im Haldol 1 mg as needed  One-to-one sitter  Difficult discharge,,  to help  Patient remains on restrain to prevent wound infection.    VTE prophylaxis: heparin       Current Facility-Administered Medications:   •  haloperidol lactate (HALDOL) injection 1 mg, 1 mg, Intramuscular, BID PRN, Lizzy Carcamo M.D., 1 mg at 06/24/19 0611  •  risperiDONE (RISPERDAL) tablet 0.25 mg, 0.25 mg, Oral, Q EVENING, Lizzy Carcamo M.D., 0.25 mg at 06/24/19 1800  •  levothyroxine (SYNTHROID) tablet 150 mcg, 150 mcg, Oral, AM ES, Nichole Heller M.D., 150 mcg at 06/25/19 0509  •  aspirin (ASA) chewable tab 81 mg, 81 mg, Oral, DAILY, Leanne Poe M.D., 81 mg at 06/25/19 0510  •  citalopram (CELEXA) tablet 20 mg, 20 mg, Oral, DAILY, Leanne Poe M.D., 20 mg at 06/25/19 0508  •  traZODone (DESYREL) tablet 50 mg, 50 mg, Oral, HS PRN, Leanne Poe M.D., 50 mg at 06/24/19 2257  •  senna-docusate (PERICOLACE or SENOKOT S) 8.6-50 MG per tablet 2 Tab, 2 Tab, Oral, BID, Stopped at 06/25/19 1800 **AND** polyethylene glycol/lytes (MIRALAX) PACKET 1 Packet, 1 Packet, Oral, QDAY PRN **AND** magnesium hydroxide (MILK OF MAGNESIA) suspension 30 mL, 30 mL, Oral, QDAY PRN **AND** bisacodyl (DULCOLAX) suppository 10 mg, 10 mg, Rectal, QDAY PRN, Leanne Poe M.D.  •  Respiratory Care per Protocol, , Nebulization, Continuous RT, Leanne Poe M.D.  •  heparin injection 5,000 Units, 5,000 Units, Subcutaneous, Q8HRS, Leanne Poe M.D., Stopped at 06/25/19 1400  •  acetaminophen  (TYLENOL) tablet 650 mg, 650 mg, Oral, Q6HRS PRN, Leanne Poe M.D., 650 mg at 06/24/19 6552

## 2019-06-26 NOTE — PROGRESS NOTES
[facility] Medicine Daily Progress Note    Date of Service  6/26/2019    Chief Complaint  89 y.o. 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 Cardiology consulted. Echocardiogram unremarkable. Discussion was done with her son who is power of  and agreeable for pacemaker placement.        Interval Problem Update  6/22. p patient has been pleasantly confused.  Patient has dementia at baseline.  Patient overall has no significant acute event.  Remains stable and complains of general fatigue and body achiness. Patient's pain is general, 2-3/10, intermittent and does not radiate to other location, sharp and with some tingling. Can be controlled by pain meds.   6/23. Patient became more more agitated during the day.  Patient required Haldol and for agitation.  Patient otherwise is very confused and unable to finish review of system.  Patient has no fever, chills, nausea vomiting diarrhea observed overnight.  6/24. Patient is still very delirious.  Patient is still confused.  Speech evaluation showing patient is able to take p.o.  Patient complains of general body achiness. Patient's pain is general 2-3/10, intermittent and does not radiate to other location, sharp and with some tingling. Can be controlled by pain meds.   6/25.  Patient still confused and patient's left chest pacer wound still not healing well enough.  Patient like to undress the wound by herself without notifying nursing staff.  Concern of wound infection if discharged to assisted living prematurely. Patient otherwise denies fever, chills, nausea, vomiting, adb pain, SOB, CP, headache, constipation, diarrhea, cough, or sputum.  6/26. Patient still very confused and still require restraint.. Patient remained  afebrile and no signs of wound infection.  Patient still complains of general fatigue and body achiness.  Patient has left chest wound and no significant pain. Patient's pain is general, 2-3/10, intermittent and does not radiate to other location, sharp and with some tingling. Can be controlled by pain meds. Dressing in place.        Consultants/Specialty  Cardiology     Code Status  Full code     Disposition  Back to patient assisted living when pacer wound started healing better    Review of Systems  Review of Systems   Unable to perform ROS: Dementia        Physical Exam  Temp:  [36.2 °C (97.2 °F)-36.8 °C (98.2 °F)] 36.6 °C (97.8 °F)  Pulse:  [60-68] 64  Resp:  [16-20] 20  BP: (128-172)/(54-73) 151/73  SpO2:  [95 %-99 %] 96 %    Physical Exam   Constitutional: No distress.   Frail, hard of hearing.    HENT:   Head: Normocephalic and atraumatic.   Nose: Nose normal.   Mouth/Throat: No oropharyngeal exudate.   Eyes: Conjunctivae and EOM are normal. Right eye exhibits no discharge. Left eye exhibits no discharge.   Neck: Normal range of motion. Neck supple. No tracheal deviation present. No thyromegaly present.   Cardiovascular: Normal rate and regular rhythm.  Exam reveals no gallop.    Murmur heard.   Systolic murmur is present with a grade of 3/6   Pulmonary/Chest: Effort normal and breath sounds normal. She has no wheezes. She has no rales. She exhibits no tenderness.   Abdominal: Soft. Bowel sounds are normal. She exhibits no distension and no mass. There is no tenderness.   Musculoskeletal: Normal range of motion. She exhibits no edema or tenderness.   Lymphadenopathy:     She has no cervical adenopathy.   Neurological: She is alert. Coordination normal.   Hard of hearing. Cranial nerve grossly intact    Skin: Skin is warm. No rash noted. She is not diaphoretic. No erythema.   Multiple bruises   Psychiatric: Her behavior is normal.       Fluids    Intake/Output Summary (Last 24 hours) at 06/26/19 0305  Last  data filed at 06/26/19 0847   Gross per 24 hour   Intake              120 ml   Output               50 ml   Net               70 ml       Laboratory  Recent Labs      06/24/19   0014  06/25/19   0043  06/26/19   0156   WBC  5.3  6.6  7.4   RBC  4.04*  4.34  4.24   HEMOGLOBIN  12.1  13.0  12.6   HEMATOCRIT  37.6  40.7  39.0   MCV  93.1  93.8  92.0   MCH  30.0  30.0  29.7   MCHC  32.2*  31.9*  32.3*   RDW  45.0  45.4  44.5   PLATELETCT  241  236  234   MPV  9.7  9.3  9.6     Recent Labs      06/24/19   0014  06/25/19   0043  06/26/19   0156   SODIUM  136  135  140   POTASSIUM  4.4  4.1  4.3   CHLORIDE  103  104  104   CO2  28  27  29   GLUCOSE  94  88  84   BUN  18  13  14   CREATININE  0.84  0.75  0.79   CALCIUM  8.9  9.1  8.9                   Imaging  DX-CHEST-LIMITED (1 VIEW)   Final Result      1.  There is slight improvement in changes of interstitial edema with trace amount of pleural fluid.   2.  Pacemaker lead projects over the cardiac silhouette. There is no visible pneumothorax.      DX-CHEST-PORTABLE (1 VIEW)   Final Result         1.  Pulmonary edema and/or infiltrates are identified, which appear somewhat increased since the prior exam.   2.  Atherosclerosis      DX-CHEST-PORTABLE (1 VIEW)   Final Result      Mild worsening reticular opacification may indicate edema      New left pacer with no evidence of pneumothorax      EC-ECHOCARDIOGRAM COMPLETE W/O CONT   Final Result      DX-CHEST-PORTABLE (1 VIEW)   Final Result      No acute cardiac or pulmonary abnormality is noted.      CL-PERMANENT PACEMAKER INSERTION    (Results Pending)        Assessment/Plan  AV block- (present on admission)   Assessment & Plan    Pacer implantation 6/21  Echo unremarkable   Syncope on admission currently resolved  Follow-up as outpatient as needed in pacer clinic.  Patient wound showing ecchymosis, continue to do wound care  Patient need to be on restrain for further time to prevent self injury to the wound.  Patient may  need to stay in the hospital for couple more days with restraint  Patient currently has no significant signs of pacer pocket infection or wound infection     Normocytic anemia- (present on admission)   Assessment & Plan    No acute bleeding  Iron panel/b12/ferritin/folic acid to follow  HH stable      Syncopal episodes- (present on admission)   Assessment & Plan    Seems to cardiogenic reason. Continue tele monitoring at this time   Resolved  Pacer in place and working appropriately     Stage 3 chronic kidney disease (HCC)- (present on admission)   Assessment & Plan    She has history of chronic kidney disease.  Avoid nephrotoxins per  Medication doses as per renal function.  Continue to monitor  Close monitor able to take p.o.  Kidney function remained relatively stable     Hypothyroidism- (present on admission)   Assessment & Plan    TSH low/free t4 elevated  I did decrease levothyroxine 175---> 150 mcg daily. High risk of over correction of her thyroid on her age   Continue to monitor      Dementia:  -Patient has severe underlying dementia  Patient become very delirious during the day.  Very agitated during the daytime, require Im Haldol 1 mg as needed  One-to-one sitter  Difficult discharge,,  to help  Patient remains on restrain to prevent wound infection.  Currently cannot be accepted back to assisted living facility due to being on restrain    VTE prophylaxis: heparin       Current Facility-Administered Medications:   •  haloperidol lactate (HALDOL) injection 1 mg, 1 mg, Intramuscular, BID PRN, Lizzy Carcamo M.D., 1 mg at 06/26/19 1340  •  risperiDONE (RISPERDAL) tablet 0.25 mg, 0.25 mg, Oral, Q EVENING, Lizzy Carcamo M.D., 0.25 mg at 06/25/19 1756  •  levothyroxine (SYNTHROID) tablet 150 mcg, 150 mcg, Oral, AM ES, Nichole Heller M.D., 150 mcg at 06/26/19 0548  •  aspirin (ASA) chewable tab 81 mg, 81 mg, Oral, DAILY, Leanne Poe M.D., 81 mg at 06/26/19 0549  •  citalopram (CELEXA) tablet 20 mg,  20 mg, Oral, DAILY, Leanne Poe M.D., 20 mg at 06/26/19 0529  •  traZODone (DESYREL) tablet 50 mg, 50 mg, Oral, HS PRN, Leanne Poe M.D., 50 mg at 06/24/19 2777  •  senna-docusate (PERICOLACE or SENOKOT S) 8.6-50 MG per tablet 2 Tab, 2 Tab, Oral, BID, Stopped at 06/25/19 1800 **AND** polyethylene glycol/lytes (MIRALAX) PACKET 1 Packet, 1 Packet, Oral, QDAY PRN **AND** magnesium hydroxide (MILK OF MAGNESIA) suspension 30 mL, 30 mL, Oral, QDAY PRN **AND** bisacodyl (DULCOLAX) suppository 10 mg, 10 mg, Rectal, QDAY PRN, Leanne Poe M.D.  •  Respiratory Care per Protocol, , Nebulization, Continuous RT, Leanne Poe M.D.  •  heparin injection 5,000 Units, 5,000 Units, Subcutaneous, Q8HRS, Leanne Poe M.D., 5,000 Units at 06/26/19 1339  •  acetaminophen (TYLENOL) tablet 650 mg, 650 mg, Oral, Q6HRS PRN, Leanne Poe M.D., 650 mg at 06/24/19 1223

## 2019-06-27 PROCEDURE — 700102 HCHG RX REV CODE 250 W/ 637 OVERRIDE(OP): Performed by: INTERNAL MEDICINE

## 2019-06-27 PROCEDURE — 99232 SBSQ HOSP IP/OBS MODERATE 35: CPT | Performed by: INTERNAL MEDICINE

## 2019-06-27 PROCEDURE — 700111 HCHG RX REV CODE 636 W/ 250 OVERRIDE (IP): Performed by: INTERNAL MEDICINE

## 2019-06-27 PROCEDURE — 770020 HCHG ROOM/CARE - TELE (206)

## 2019-06-27 PROCEDURE — A9270 NON-COVERED ITEM OR SERVICE: HCPCS | Performed by: INTERNAL MEDICINE

## 2019-06-27 PROCEDURE — 97535 SELF CARE MNGMENT TRAINING: CPT

## 2019-06-27 PROCEDURE — 97530 THERAPEUTIC ACTIVITIES: CPT

## 2019-06-27 RX ADMIN — HEPARIN SODIUM 5000 UNITS: 5000 INJECTION, SOLUTION INTRAVENOUS; SUBCUTANEOUS at 16:44

## 2019-06-27 RX ADMIN — RISPERIDONE 0.25 MG: 0.5 TABLET ORAL at 16:44

## 2019-06-27 RX ADMIN — TRAZODONE HYDROCHLORIDE 50 MG: 50 TABLET ORAL at 21:39

## 2019-06-27 RX ADMIN — SENNOSIDES AND DOCUSATE SODIUM 2 TABLET: 8.6; 5 TABLET ORAL at 05:09

## 2019-06-27 RX ADMIN — CITALOPRAM HYDROBROMIDE 20 MG: 20 TABLET ORAL at 05:07

## 2019-06-27 RX ADMIN — HEPARIN SODIUM 5000 UNITS: 5000 INJECTION, SOLUTION INTRAVENOUS; SUBCUTANEOUS at 05:10

## 2019-06-27 RX ADMIN — ASPIRIN 81 MG 81 MG: 81 TABLET ORAL at 05:09

## 2019-06-27 RX ADMIN — HEPARIN SODIUM 5000 UNITS: 5000 INJECTION, SOLUTION INTRAVENOUS; SUBCUTANEOUS at 21:39

## 2019-06-27 RX ADMIN — LEVOTHYROXINE SODIUM 150 MCG: 75 TABLET ORAL at 05:08

## 2019-06-27 ASSESSMENT — COGNITIVE AND FUNCTIONAL STATUS - GENERAL
EATING MEALS: A LITTLE
DRESSING REGULAR UPPER BODY CLOTHING: A LITTLE
SUGGESTED CMS G CODE MODIFIER DAILY ACTIVITY: CK
PERSONAL GROOMING: A LOT
DAILY ACTIVITIY SCORE: 14
TOILETING: A LOT
DRESSING REGULAR LOWER BODY CLOTHING: A LOT
HELP NEEDED FOR BATHING: A LOT

## 2019-06-27 NOTE — PROGRESS NOTES
Bedside report received. POC discussed with pt; Pt is confused, but is resting in bed at this time.

## 2019-06-27 NOTE — PROGRESS NOTES
Patient managed to get out of her restraints for a second time tonight. Pt tore off her heart monitor and tore off her wound dressings and continued to pick at her pacemaker. Pt remained safe in bed and was put back in her restraints.

## 2019-06-27 NOTE — CARE PLAN
Problem: Safety  Goal: Will remain free from falls    Intervention: Assess risk factors for falls  Fall precautions in place. Bed in lowest position. Non-skid socks in place. Personal possessions within reach. Mobility sign on door. Bed-alarm on. Call light within reach. Pt educated regarding fall prevention, needs learning reinforcement.      Problem: Knowledge Deficit  Goal: Knowledge of disease process/condition, treatment plan, diagnostic tests, and medications will improve    Intervention: Explain information regarding disease process/condition, treatment plan, diagnostic tests, and medications and document in education  Pt educated regarding plan of care and medications. All questions answered.

## 2019-06-27 NOTE — PROGRESS NOTES
[facility] Medicine Daily Progress Note    Date of Service  6/27/2019    Chief Complaint  89 y.o. 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 Cardiology consulted. Echocardiogram unremarkable. Discussion was done with her son who is power of  and agreeable for pacemaker placement.        Interval Problem Update  6/22. p patient has been pleasantly confused.  Patient has dementia at baseline.  Patient overall has no significant acute event.  Remains stable and complains of general fatigue and body achiness. Patient's pain is general, 2-3/10, intermittent and does not radiate to other location, sharp and with some tingling. Can be controlled by pain meds.   6/23. Patient became more more agitated during the day.  Patient required Haldol and for agitation.  Patient otherwise is very confused and unable to finish review of system.  Patient has no fever, chills, nausea vomiting diarrhea observed overnight.  6/24. Patient is still very delirious.  Patient is still confused.  Speech evaluation showing patient is able to take p.o.  Patient complains of general body achiness. Patient's pain is general 2-3/10, intermittent and does not radiate to other location, sharp and with some tingling. Can be controlled by pain meds.   6/25.  Patient still confused and patient's left chest pacer wound still not healing well enough.  Patient like to undress the wound by herself without notifying nursing staff.  Concern of wound infection if discharged to assisted living prematurely. Patient otherwise denies fever, chills, nausea, vomiting, adb pain, SOB, CP, headache, constipation, diarrhea, cough, or sputum.  6/26. Patient still very confused and still require restraint.. Patient remained  afebrile and no signs of wound infection.  Patient still complains of general fatigue and body achiness.  Patient has left chest wound and no significant pain. Patient's pain is general, 2-3/10, intermittent and does not radiate to other location, sharp and with some tingling. Can be controlled by pain meds. Dressing in place.  6/27.  Patient is calmer than yesterday but still require restraint to prevent damage her own pacer pocket incision.  Patient still very confused. Patient otherwise denies fever, chills, nausea, vomiting, adb pain, SOB, CP, headache, constipation, diarrhea, cough, or sputum.    Consultants/Specialty  Cardiology     Code Status  Full code     Disposition  Back to patient assisted living when pacer wound started healing better    Review of Systems  Review of Systems   Unable to perform ROS: Dementia        Physical Exam  Temp:  [35.9 °C (96.7 °F)-36.6 °C (97.8 °F)] 36.2 °C (97.2 °F)  Pulse:  [60-65] 65  Resp:  [18] 18  BP: (128-160)/(58-78) 128/72  SpO2:  [96 %-98 %] 97 %    Physical Exam   Constitutional: She appears well-developed and well-nourished.   Frail, hard of hearing.    HENT:   Head: Normocephalic and atraumatic.   Nose: Nose normal.   Mouth/Throat: No oropharyngeal exudate.   Eyes: Pupils are equal, round, and reactive to light. EOM are normal.   Neck: Normal range of motion. Neck supple. No tracheal deviation present.   Cardiovascular: Normal rate and regular rhythm.  Exam reveals no gallop and no friction rub.    Murmur heard.   Systolic murmur is present with a grade of 3/6   Pulmonary/Chest: Effort normal and breath sounds normal. She has no wheezes. She has no rales. She exhibits no tenderness.   Abdominal: Soft. Bowel sounds are normal. She exhibits no distension and no mass. There is no tenderness.   Musculoskeletal: Normal range of motion. She exhibits no edema or tenderness.   Lymphadenopathy:     She has no cervical adenopathy.   Neurological: She is alert. Coordination  normal.   Hard of hearing. Cranial nerve grossly intact    Skin: Skin is warm. No rash noted. No erythema.   Multiple bruises   Psychiatric: Her behavior is normal.       Fluids    Intake/Output Summary (Last 24 hours) at 06/27/19 1652  Last data filed at 06/26/19 2000   Gross per 24 hour   Intake               50 ml   Output                0 ml   Net               50 ml       Laboratory  Recent Labs      06/25/19   0043  06/26/19   0156   WBC  6.6  7.4   RBC  4.34  4.24   HEMOGLOBIN  13.0  12.6   HEMATOCRIT  40.7  39.0   MCV  93.8  92.0   MCH  30.0  29.7   MCHC  31.9*  32.3*   RDW  45.4  44.5   PLATELETCT  236  234   MPV  9.3  9.6     Recent Labs      06/25/19   0043  06/26/19   0156   SODIUM  135  140   POTASSIUM  4.1  4.3   CHLORIDE  104  104   CO2  27  29   GLUCOSE  88  84   BUN  13  14   CREATININE  0.75  0.79   CALCIUM  9.1  8.9                   Imaging  DX-CHEST-LIMITED (1 VIEW)   Final Result      1.  There is slight improvement in changes of interstitial edema with trace amount of pleural fluid.   2.  Pacemaker lead projects over the cardiac silhouette. There is no visible pneumothorax.      DX-CHEST-PORTABLE (1 VIEW)   Final Result         1.  Pulmonary edema and/or infiltrates are identified, which appear somewhat increased since the prior exam.   2.  Atherosclerosis      DX-CHEST-PORTABLE (1 VIEW)   Final Result      Mild worsening reticular opacification may indicate edema      New left pacer with no evidence of pneumothorax      EC-ECHOCARDIOGRAM COMPLETE W/O CONT   Final Result      DX-CHEST-PORTABLE (1 VIEW)   Final Result      No acute cardiac or pulmonary abnormality is noted.      CL-PERMANENT PACEMAKER INSERTION    (Results Pending)        Assessment/Plan  AV block- (present on admission)   Assessment & Plan    Pacer implantation 6/21  Echo unremarkable   Syncope on admission currently resolved  Follow-up as outpatient as needed in pacer clinic.  Patient wound showing ecchymosis, continue to do  wound care  Patient need to be on restrain for further time to prevent self injury to the wound.  Patient may need to stay in the hospital for couple more days with restraint  Patient currently has no significant signs of pacer pocket infection or wound infection , still require restraint in place to prevent self injury     Normocytic anemia- (present on admission)   Assessment & Plan    No acute bleeding  Iron panel/b12/ferritin/folic acid to follow  HH stable      Syncopal episodes- (present on admission)   Assessment & Plan    Seems to cardiogenic reason. Continue tele monitoring at this time   Resolved  Pacer in place and working appropriately     Stage 3 chronic kidney disease (HCC)- (present on admission)   Assessment & Plan    She has history of chronic kidney disease.  Avoid nephrotoxins per  Medication doses as per renal function.  Continue to monitor  Close monitor able to take p.o.  Kidney function remained relatively stable     Hypothyroidism- (present on admission)   Assessment & Plan    TSH low/free t4 elevated  I did decrease levothyroxine 175---> 150 mcg daily. High risk of over correction of her thyroid on her age   Continue to monitor      Dementia:  -Patient has severe underlying dementia  Patient become very delirious during the day.  Very agitated during the daytime, require Im Haldol 1 mg as needed  One-to-one sitter  Difficult discharge,  to help, pending acceptance by assisted-living  Patient remains on restrain to prevent wound infection.  Currently cannot be accepted back to assisted living facility due to being on restrain    VTE prophylaxis: heparin       Current Facility-Administered Medications:   •  haloperidol lactate (HALDOL) injection 1 mg, 1 mg, Intramuscular, BID PRN, Lizzy Carcamo M.D., 1 mg at 06/26/19 1340  •  risperiDONE (RISPERDAL) tablet 0.25 mg, 0.25 mg, Oral, Q EVENING, Lizzy Carcamo M.D., 0.25 mg at 06/27/19 1644  •  levothyroxine (SYNTHROID) tablet 150 mcg, 150 mcg,  Oral, AM ES, Nichole Heller M.D., 150 mcg at 06/27/19 0508  •  aspirin (ASA) chewable tab 81 mg, 81 mg, Oral, DAILY, Leanne Poe M.D., 81 mg at 06/27/19 0509  •  citalopram (CELEXA) tablet 20 mg, 20 mg, Oral, DAILY, Leanne Poe M.D., 20 mg at 06/27/19 0507  •  traZODone (DESYREL) tablet 50 mg, 50 mg, Oral, HS PRN, Leanne Poe M.D., 50 mg at 06/26/19 2331  •  senna-docusate (PERICOLACE or SENOKOT S) 8.6-50 MG per tablet 2 Tab, 2 Tab, Oral, BID, Stopped at 06/27/19 1645 **AND** polyethylene glycol/lytes (MIRALAX) PACKET 1 Packet, 1 Packet, Oral, QDAY PRN **AND** magnesium hydroxide (MILK OF MAGNESIA) suspension 30 mL, 30 mL, Oral, QDAY PRN **AND** bisacodyl (DULCOLAX) suppository 10 mg, 10 mg, Rectal, QDAY PRN, Leanne Poe M.D.  •  Respiratory Care per Protocol, , Nebulization, Continuous RT, Leanne Poe M.D.  •  heparin injection 5,000 Units, 5,000 Units, Subcutaneous, Q8HRS, Leanne Poe M.D., 5,000 Units at 06/27/19 1644  •  acetaminophen (TYLENOL) tablet 650 mg, 650 mg, Oral, Q6HRS PRN, Leanne Poe M.D., 650 mg at 06/24/19 1223

## 2019-06-27 NOTE — THERAPY
"Occupational Therapy Treatment completed with focus on ADLs, ADL transfers, patient education and cognition.  Functional Status: Supine > EOB min A, transfers with FWW min A, standing grooming mod A  Plan of Care: Will benefit from Occupational Therapy 2 times per week  Discharge Recommendations:  Equipment Will Continue to Assess for Equipment Needs. Post-acute therapy: Discharge to a transitional care facility for continued skilled therapy services.    See \"Rehab Therapy-Acute\" Patient Summary Report for complete documentation.     Pt seen for OT tx. Received in bed, wrist restraints in place due to pulling a pacemaker dressing. Pt crying out \"help me!\" When asked what she needed help with she replied \"My gown is pulled up\" (which it was) \"and these things\" (indicating wrist restraints). Pt mobilized to EOB via logroll to R. Mobilized to bathroom using FWW. Completed toilet transfer, but denied need to void or have BM. Pt stood at sink for >5 min to completed hair brushing, face washing and oral care with mouthwash. Pt demos BUE tremor; unknown if baseline. Pt is oriented to \"hospital\" in \"[place]\" and \"summer\" as season. Not oriented to month, year, or reason for admit. OT provided further orientation to date and pacemaker placement. Pt is limited by: balance impairment, generalized weakness, pacemaker precautions, cognitive deficits, activity intolerance. Pt may be close to functional baseline from OT standpoint (difficult to determine), however may benefit from acute OT at lower frequency to facilitate independence with functional ADL, safe transfers  and OOB activity. Will continue to follow.   "

## 2019-06-27 NOTE — CARE PLAN
Problem: Safety  Goal: Will remain free from falls  Outcome: PROGRESSING SLOWER THAN EXPECTED  Bed in lowest position with call light and bedside table within reach. Instructed patient to use call light for assistance,no sign of understanding will continue to monitor.

## 2019-06-27 NOTE — PROGRESS NOTES
Patient pulled off restraints. Pulled off left chest dsg for PM. Skin tear over PM site bleeding. New skin tear under patient neck bleeding. Placed patient back in restraints and placed new dsg on PM and skin tear under neck.

## 2019-06-27 NOTE — PROGRESS NOTES
2 RN skin check complete with MARIBELL Hay.  Devices in place: Bilateral soft wrist restraints, silicone oxygen tubing, bilateral hearing aides.  Skin assessed under devices: Yes.  Confirmed pressure ulcers found on: N/A.  New potential pressure ulcers noted on: N/A. Wound consult placed for skin tears.  The following interventions in place: Q2H turning, waffle cushion in place, pillows in use for positioning, preventative dressings on elbows, barrier cream and wipes in use, changing bedding frequently for incontinence, checking restraints Q2H, dressings in place for wounds    Head has a wound with 2 staples, clean, dry, intact  Bilateral ears pink and blanching, foam in place and silicone oxygen tubing in use  Generalized bruising on trunk and bilateral upper extremities  Skin tear on left upper arm, dressing in place  Skin tear on right lower arm, dressing in place  Skin tear on upper back, dressing in place  Skin tear on neck, new dressing placed, picture taken, wound consult placed  Skin tear over pacemaker site, new dressing in place  Bilateral legs dry, but intact  Bilateral heels are red and blanching  Sacrum is red, intact, and blanching  All skin is noted to be fragile

## 2019-06-28 PROCEDURE — 99024 POSTOP FOLLOW-UP VISIT: CPT | Performed by: INTERNAL MEDICINE

## 2019-06-28 PROCEDURE — 99232 SBSQ HOSP IP/OBS MODERATE 35: CPT | Performed by: INTERNAL MEDICINE

## 2019-06-28 PROCEDURE — 700102 HCHG RX REV CODE 250 W/ 637 OVERRIDE(OP): Performed by: INTERNAL MEDICINE

## 2019-06-28 PROCEDURE — A9270 NON-COVERED ITEM OR SERVICE: HCPCS | Performed by: INTERNAL MEDICINE

## 2019-06-28 PROCEDURE — 700111 HCHG RX REV CODE 636 W/ 250 OVERRIDE (IP): Performed by: INTERNAL MEDICINE

## 2019-06-28 PROCEDURE — 770020 HCHG ROOM/CARE - TELE (206)

## 2019-06-28 RX ADMIN — TRAZODONE HYDROCHLORIDE 50 MG: 50 TABLET ORAL at 21:16

## 2019-06-28 RX ADMIN — SENNOSIDES AND DOCUSATE SODIUM 2 TABLET: 8.6; 5 TABLET ORAL at 05:01

## 2019-06-28 RX ADMIN — CITALOPRAM HYDROBROMIDE 20 MG: 20 TABLET ORAL at 05:01

## 2019-06-28 RX ADMIN — LEVOTHYROXINE SODIUM 150 MCG: 75 TABLET ORAL at 05:01

## 2019-06-28 RX ADMIN — ASPIRIN 81 MG 81 MG: 81 TABLET ORAL at 05:01

## 2019-06-28 RX ADMIN — HEPARIN SODIUM 5000 UNITS: 5000 INJECTION, SOLUTION INTRAVENOUS; SUBCUTANEOUS at 21:16

## 2019-06-28 RX ADMIN — RISPERIDONE 0.25 MG: 0.5 TABLET ORAL at 16:43

## 2019-06-28 RX ADMIN — HEPARIN SODIUM 5000 UNITS: 5000 INJECTION, SOLUTION INTRAVENOUS; SUBCUTANEOUS at 05:01

## 2019-06-28 RX ADMIN — HEPARIN SODIUM 5000 UNITS: 5000 INJECTION, SOLUTION INTRAVENOUS; SUBCUTANEOUS at 13:56

## 2019-06-28 NOTE — PROGRESS NOTES
2 RN skin check complete.   Devices in place BL Soft Wrist Restraints, Silicone Oxygen Tubing.  Skin assessed under devices YES.  Wound consult placed for skin tears, chest wounds.  The following interventions in place Q2hr turns, waffle cushion, pillows for positioning, foam pads on fragile skin, barrier cream utilized    2-RN skin assessment completed this shift with MARIBELL Hay.  Skin was assessed for abnormalities head to toe and noted in detail skin note.      FACE/NECK: Intact    BACK of HEAD: Wound with 2 Staples, Clean, Dry, Intact    EARS: Blanching     CHEST: Upper Chest Bruise, Left Chest Wall Pacer Site with Skin Tear from picking-Dressing In Place, Mid Neck Skin Tear from picking-Dressing In [place]     ABDOMEN: Scattered Bruising     BACK: Mid Back Skin Tear Dressing in Place      BUTTOCKS/SACRUM: Blanching Redness     ARMS: BL Wrist Soft Wrist Restraints, Skin Assessed Under Retraint, notoed to be intact.  Skin Tear on Right Lower Arm-Dressing In Place, Skin Tear on Left Upper Arm-Dressing In Place, BL Arms very fragile skin, generalized bruising.      LEGS: Intact    FEET: Heels boggy & blanching, elevated on pillows.      This completes the 2-RN skin assessment.      Rafael Musa

## 2019-06-28 NOTE — PROGRESS NOTES
Telemetry Summary:    Measurements: V-PACED     Rhythm: V-PACED    Ectopy: PAC    Rate: 60    Rafael Musa

## 2019-06-28 NOTE — PROGRESS NOTES
S/P successful [facility] single chamber pacemaker implantation by Dr. 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     Patient is still in hospital and therefore completed bedside wound check and 1 week device interrogation today. Device interrogation shows normal sensing and function.     Wound check completed at bedside. Left upper chest device site dressing was removed and site has previous skin tear located medial to device site. Device not visualized. No evidence of edema, erythema, or hematoma present. Discussed with Dr. Phelps, he will assess device site.  Wound care referral placed. Patient has dementia, in restraints.     Dicussed with RN at bedside. Patient will need device check in 4 weeks.  Office to coordinate.     Please contact me with any questions or concerns.     Future Appointments  Date Time Provider Department Center   7/22/2019 3:20 PM [facility]. [facility] None   8/1/2019 3:15 PM PACER CHECK-CAM B [facility] None     Thanks,     KEHINDE Avery   [facility]  [phone]

## 2019-06-28 NOTE — CARE PLAN
Problem: Communication  Goal: The ability to communicate needs accurately and effectively will improve  Outcome: PROGRESSING AS EXPECTED  Discussed POC with patient, patient agrees to participate in POC.  Patient encouraged to use call bell to ring for assistance.  Patient oriented to room, call bell, and bed controls.  Open line of communication established with the patient.  Patient confused.      Problem: Safety  Goal: Will remain free from injury  Outcome: PROGRESSING AS EXPECTED  Instructed patient to use call bell and ring for assistance prior to ambulation.  Non-Skid foot ware in place, bed in low locked position, call bell and personal belongings within reach.        Problem: Venous Thromboembolism (VTW)/Deep Vein Thrombosis (DVT) Prevention:  Goal: Patient will participate in Venous Thrombosis (VTE)/Deep Vein Thrombosis (DVT)Prevention Measures  Outcome: PROGRESSING AS EXPECTED  SQ Heparin     Problem: Knowledge Deficit  Goal: Knowledge of disease process/condition, treatment plan, diagnostic tests, and medications will improve  Outcome: PROGRESSING AS EXPECTED  Discussed plan of care and medications with patient.  Patient verbalizes understandings of treatment regimen.        Problem: Skin Integrity  Goal: Risk for impaired skin integrity will decrease  Outcome: PROGRESSING AS EXPECTED  See Skin Note     Problem: Urinary Elimination:  Goal: Ability to reestablish a normal urinary elimination pattern will improve  Outcome: PROGRESSING AS EXPECTED  Incontinence     Problem: Mobility  Goal: Risk for activity intolerance will decrease  Outcome: PROGRESSING AS EXPECTED  Wrist Restrained Turn and Position Q2hrs     Problem: Respiratory:  Goal: Respiratory status will improve  Outcome: PROGRESSING AS EXPECTED  2L Oxygen     Problem: Pain Management  Goal: Pain level will decrease to patient's comfort goal  Outcome: PROGRESSING AS EXPECTED  Patient does not appear to be in pain or distress.  Will continue to  monitor.

## 2019-06-28 NOTE — PROGRESS NOTES
[facility] Medicine Daily Progress Note    Date of Service  6/28/2019    Chief Complaint  89 y.o. 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 Cardiology consulted. Echocardiogram unremarkable. Discussion was done with her son who is power of  and agreeable for pacemaker placement.        Interval Problem Update  6/22. p patient has been pleasantly confused.  Patient has dementia at baseline.  Patient overall has no significant acute event.  Remains stable and complains of general fatigue and body achiness. Patient's pain is general, 2-3/10, intermittent and does not radiate to other location, sharp and with some tingling. Can be controlled by pain meds.   6/23. Patient became more more agitated during the day.  Patient required Haldol and for agitation.  Patient otherwise is very confused and unable to finish review of system.  Patient has no fever, chills, nausea vomiting diarrhea observed overnight.  6/24. Patient is still very delirious.  Patient is still confused.  Speech evaluation showing patient is able to take p.o.  Patient complains of general body achiness. Patient's pain is general 2-3/10, intermittent and does not radiate to other location, sharp and with some tingling. Can be controlled by pain meds.   6/25.  Patient still confused and patient's left chest pacer wound still not healing well enough.  Patient like to undress the wound by herself without notifying nursing staff.  Concern of wound infection if discharged to assisted living prematurely. Patient otherwise denies fever, chills, nausea, vomiting, adb pain, SOB, CP, headache, constipation, diarrhea, cough, or sputum.  6/26. Patient still very confused and still require restraint.. Patient remained  afebrile and no signs of wound infection.  Patient still complains of general fatigue and body achiness.  Patient has left chest wound and no significant pain. Patient's pain is general, 2-3/10, intermittent and does not radiate to other location, sharp and with some tingling. Can be controlled by pain meds. Dressing in place.  6/27.  Patient is calmer than yesterday but still require restraint to prevent damage her own pacer pocket incision.  Patient still very confused. Patient otherwise denies fever, chills, nausea, vomiting, adb pain, SOB, CP, headache, constipation, diarrhea, cough, or sputum.  6/28.  Patient wound has been checked today, does not have signs of hematoma or infection.  Still on restrain to protect damage of the incision.  Pending wound care recommendation.  Due to dementia unable to finish review of system.    Consultants/Specialty  Cardiology     Code Status  Full code     Disposition  Back to patient assisted living when pacer wound started healing better    Review of Systems  Review of Systems   Unable to perform ROS: Dementia        Physical Exam  Temp:  [36 °C (96.8 °F)-36.4 °C (97.6 °F)] 36.4 °C (97.6 °F)  Pulse:  [60-65] 61  Resp:  [14-18] 14  BP: (123-160)/(54-68) 132/54  SpO2:  [95 %-98 %] 98 %    Physical Exam   Constitutional: She appears well-developed.   Frail, hard of hearing.    HENT:   Head: Normocephalic and atraumatic.   Nose: Nose normal.   Mouth/Throat: No oropharyngeal exudate.   Eyes: Pupils are equal, round, and reactive to light. EOM are normal.   Neck: Normal range of motion. Neck supple. No tracheal deviation present.   Cardiovascular: Normal rate and regular rhythm.  Exam reveals no gallop and no friction rub.    Murmur heard.   Systolic murmur is present with a grade of 3/6   Pulmonary/Chest: Effort normal and breath sounds normal. She has no wheezes. She has no rales. She exhibits no tenderness.   Abdominal: Soft. Bowel sounds are normal. She exhibits no distension  and no mass. There is no tenderness.   Musculoskeletal: Normal range of motion. She exhibits no tenderness.   Lymphadenopathy:     She has no cervical adenopathy.   Neurological: She is alert. Coordination normal.   Hard of hearing. Cranial nerve grossly intact    Skin: Skin is warm. No rash noted. She is not diaphoretic.   Multiple bruises,Patient's pacer pocket showing no signs of infection.   Psychiatric: Her behavior is normal.       Fluids    Intake/Output Summary (Last 24 hours) at 06/28/19 1655  Last data filed at 06/28/19 0600   Gross per 24 hour   Intake               60 ml   Output                0 ml   Net               60 ml       Laboratory  Recent Labs      06/26/19   0156   WBC  7.4   RBC  4.24   HEMOGLOBIN  12.6   HEMATOCRIT  39.0   MCV  92.0   MCH  29.7   MCHC  32.3*   RDW  44.5   PLATELETCT  234   MPV  9.6     Recent Labs      06/26/19   0156   SODIUM  140   POTASSIUM  4.3   CHLORIDE  104   CO2  29   GLUCOSE  84   BUN  14   CREATININE  0.79   CALCIUM  8.9                   Imaging  DX-CHEST-LIMITED (1 VIEW)   Final Result      1.  There is slight improvement in changes of interstitial edema with trace amount of pleural fluid.   2.  Pacemaker lead projects over the cardiac silhouette. There is no visible pneumothorax.      DX-CHEST-PORTABLE (1 VIEW)   Final Result         1.  Pulmonary edema and/or infiltrates are identified, which appear somewhat increased since the prior exam.   2.  Atherosclerosis      DX-CHEST-PORTABLE (1 VIEW)   Final Result      Mild worsening reticular opacification may indicate edema      New left pacer with no evidence of pneumothorax      EC-ECHOCARDIOGRAM COMPLETE W/O CONT   Final Result      DX-CHEST-PORTABLE (1 VIEW)   Final Result      No acute cardiac or pulmonary abnormality is noted.      CL-PERMANENT PACEMAKER INSERTION    (Results Pending)        Assessment/Plan  AV block- (present on admission)   Assessment & Plan    Pacer implantation 6/21  Echo unremarkable    Syncope on admission currently resolved  Follow-up as outpatient as needed in pacer clinic.  Pacer pocket incision showing no signs of hematoma or infection.  Pending wound care recommendation.  Patient may be able to off restrain since patient's wound has been healing well.       Normocytic anemia- (present on admission)   Assessment & Plan    No acute bleeding  Iron panel/b12/ferritin/folic acid to follow  HH stable      Syncopal episodes- (present on admission)   Assessment & Plan    Seems to cardiogenic reason. Continue tele monitoring at this time   Resolved  Pacer in place and working appropriately     Stage 3 chronic kidney disease (HCC)- (present on admission)   Assessment & Plan    She has history of chronic kidney disease.  Avoid nephrotoxins per  Medication doses as per renal function.  Continue to monitor  Close monitor able to take p.o.  Kidney function remained relatively stable     Hypothyroidism- (present on admission)   Assessment & Plan    TSH low/free t4 elevated  I did decrease levothyroxine 175---> 150 mcg daily. High risk of over correction of her thyroid on her age   Continue to monitor      Dementia:  -Patient has severe underlying dementia  Patient become very delirious during the day.  Very agitated during the daytime, require Im Haldol 1 mg as needed  One-to-one sitter  Difficult discharge,  to help, pending acceptance by assisted-living  Pacer pocket incision showing no signs of hematoma or infection.  Pending wound care recommendation.  Patient may be able to off restrain since patient's wound has been healing well.    VTE prophylaxis: heparin       Current Facility-Administered Medications:   •  haloperidol lactate (HALDOL) injection 1 mg, 1 mg, Intramuscular, BID PRN, Lizzy Carcamo M.D., 1 mg at 06/26/19 1340  •  risperiDONE (RISPERDAL) tablet 0.25 mg, 0.25 mg, Oral, Q EVENING, Lizzy Carcamo M.D., 0.25 mg at 06/28/19 1643  •  levothyroxine (SYNTHROID) tablet 150 mcg, 150 mcg,  Oral, AM ES, Nichole Heller M.D., 150 mcg at 06/28/19 0501  •  aspirin (ASA) chewable tab 81 mg, 81 mg, Oral, DAILY, Leanne Poe M.D., 81 mg at 06/28/19 0501  •  citalopram (CELEXA) tablet 20 mg, 20 mg, Oral, DAILY, Leanne Poe M.D., 20 mg at 06/28/19 0501  •  traZODone (DESYREL) tablet 50 mg, 50 mg, Oral, HS PRN, Leanne Poe M.D., 50 mg at 06/27/19 2139  •  senna-docusate (PERICOLACE or SENOKOT S) 8.6-50 MG per tablet 2 Tab, 2 Tab, Oral, BID, Stopped at 06/28/19 1800 **AND** polyethylene glycol/lytes (MIRALAX) PACKET 1 Packet, 1 Packet, Oral, QDAY PRN **AND** magnesium hydroxide (MILK OF MAGNESIA) suspension 30 mL, 30 mL, Oral, QDAY PRN **AND** bisacodyl (DULCOLAX) suppository 10 mg, 10 mg, Rectal, QDAY PRN, Leanne Poe M.D.  •  Respiratory Care per Protocol, , Nebulization, Continuous RT, Leanne Poe M.D.  •  heparin injection 5,000 Units, 5,000 Units, Subcutaneous, Q8HRS, Leanne Poe M.D., 5,000 Units at 06/28/19 1356  •  acetaminophen (TYLENOL) tablet 650 mg, 650 mg, Oral, Q6HRS PRN, Leanne Poe M.D., 650 mg at 06/24/19 1223

## 2019-06-28 NOTE — DISCHARGE PLANNING
Discussed in rounds that the patient is still picking at her wounds, The patient remains in soft restrains but is frequently able to get out of them. Dr. Carcamo is going to order Wound care consult to get suggestions on wound dressings.

## 2019-06-28 NOTE — PROGRESS NOTES
Received pt report from day RN Juan Antonio.    Pt awake, alert, oriented X 1-3.  Pt resting in bed.  Bed in low locked position, call bell at the bedside, tray table & personal belongings within reach. Non-skid footwear intact. White board updated to reflect plan of care for current shift. Pt door tags reviewed. Bed Alarm ON.    Assessed patient’s Neuro Status, Comfort Level, Wrist Restraints, Chest Wounds.    Vitals WNL.    Tele PACED.    Rafael Musa

## 2019-06-28 NOTE — DIETARY
NUTRITION SERVICES - Brief Nutrition Note    Pt re-screened per department policy.  PO intake noted to be variable, however, few PO records available to fully assess for adequacy. Discussed intake with bedside RN and she reports that pt eats the most at her breakfast meal. Pt with varying orientation (AAOx1-3 per documentation) and restrained in order to prevent her from damaging her pacer incision. Nutrition services has been sending the house special for meals as pt cannot provide meal preferences. Will establish POC to monitor her intake and make recommendations accordingly. For now I added greek yogurt with meals (14g protein each) to help with intake.    6/28: 70% breakfast, 25-50% of lunch  6/27: 25-50% breakfast  6/26: 25-50% breakfast, <25% dinner  6/25: 25-50%breakfast, <25% lunch    Plan/Recommend:  1) Continue current diet. Greek yogurt with meals to help with intake.    RD monitoring.

## 2019-06-29 PROCEDURE — 700102 HCHG RX REV CODE 250 W/ 637 OVERRIDE(OP): Performed by: INTERNAL MEDICINE

## 2019-06-29 PROCEDURE — A9270 NON-COVERED ITEM OR SERVICE: HCPCS | Performed by: INTERNAL MEDICINE

## 2019-06-29 PROCEDURE — 99232 SBSQ HOSP IP/OBS MODERATE 35: CPT | Performed by: INTERNAL MEDICINE

## 2019-06-29 PROCEDURE — 700111 HCHG RX REV CODE 636 W/ 250 OVERRIDE (IP): Performed by: INTERNAL MEDICINE

## 2019-06-29 PROCEDURE — 770020 HCHG ROOM/CARE - TELE (206)

## 2019-06-29 RX ADMIN — HEPARIN SODIUM 5000 UNITS: 5000 INJECTION, SOLUTION INTRAVENOUS; SUBCUTANEOUS at 21:59

## 2019-06-29 RX ADMIN — CITALOPRAM HYDROBROMIDE 20 MG: 20 TABLET ORAL at 05:44

## 2019-06-29 RX ADMIN — LEVOTHYROXINE SODIUM 150 MCG: 75 TABLET ORAL at 05:44

## 2019-06-29 RX ADMIN — RISPERIDONE 0.25 MG: 0.5 TABLET ORAL at 16:49

## 2019-06-29 RX ADMIN — ASPIRIN 81 MG 81 MG: 81 TABLET ORAL at 05:44

## 2019-06-29 RX ADMIN — HALOPERIDOL LACTATE 1 MG: 5 INJECTION, SOLUTION INTRAMUSCULAR at 00:52

## 2019-06-29 RX ADMIN — SENNOSIDES AND DOCUSATE SODIUM 2 TABLET: 8.6; 5 TABLET ORAL at 17:04

## 2019-06-29 RX ADMIN — TRAZODONE HYDROCHLORIDE 50 MG: 50 TABLET ORAL at 22:14

## 2019-06-29 RX ADMIN — HEPARIN SODIUM 5000 UNITS: 5000 INJECTION, SOLUTION INTRAVENOUS; SUBCUTANEOUS at 05:44

## 2019-06-29 RX ADMIN — HEPARIN SODIUM 5000 UNITS: 5000 INJECTION, SOLUTION INTRAVENOUS; SUBCUTANEOUS at 16:49

## 2019-06-29 NOTE — WOUND TEAM
"[facility]  Inpatient Services  Initial Wound and Skin Care Evaluation    Admission Date: 6/20/2019     Consult Date: 06/28/2019  HPI, PMH, SH: Reviewed    Unit where seen by Wound Team: T802/00     WOUND CONSULT RELATED TO:  Skin tears to chest     SUBJECTIVE:  \"hi.\"      Self Report / Pain Level:  Winced when cleaning wounds       OBJECTIVE:  Fatigued.     WOUND TYPE, LOCATION, CHARACTERISTICS (Pressure Injuries: location, stage, POA or date identified)             Wound 06/26/19 Skin Tear Chest neck just above midline clavicle, over pacemaker (Active)   Site Assessment Red    Sandee-wound Assessment Red;Purple;Other (Comment)    Margins Unattached edges    Wound Length (cm) 1 cm    Wound Width (cm) 3 cm    Wound Depth (cm) 0.2 cm    Wound Surface Area (cm^2) 3 cm^2    Tunneling 0 cm    Undermining 0 cm    Closure None    Drainage Amount Scant    Drainage Description Serosanguineous    Non-staged Wound Description Partial thickness    Treatments Site care;Cleansed    Cleansing Normal Saline Irrigation    Periwound Protectant Not Applicable    Dressing Options Nonadherent Contact Layer;Adhesive Foam    Dressing Cleansing/Solutions Not Applicable    Dressing Changed Changed    Dressing Status Clean;Dry;Intact    Dressing Change Frequency Every 48 hrs    NEXT Dressing Change  06/30/19    NEXT Weekly Photo (Inpatient Only) 07/03/19    WOUND NURSE ONLY - Odor None    WOUND NURSE ONLY - Exposed Structures None    WOUND NURSE ONLY - Tissue Type and Percentage 100% red       Vascular:    Dorsal Pedal pulses:  NA  Posterior tib pulses:   NA    AIDE:      NA    Lab Values:    WBC:       WBC   Date/Time Value Ref Range Status   06/26/2019 01:56 AM 7.4 4.8 - 10.8 K/uL Final     A1C:    No results found for: HBA1C        Culture:   Obtained NA no signs of infection, Results NA    INTERVENTIONS BY WOUND TEAM:  Removed dressings from tears .Cleaned with saline and gauze. Dressed with adaptic and adhesive silicone " foams    Dressing selection:  See above         Interdisciplinary consultation: Patient, Bedside RN     EVALUATION: adaptic prevents cover dressing from adhering to wounds, adhesive silicone foams are absorptive and gentle to skin    Factors affecting wound healing: age, fall, dementia  Goals: Steady decrease in wound area and depth weekly.    NURSING PLAN OF CARE ORDERS (X):    Dressing changes: See Dressing Care orders: X  Skin care: See Skin Care orders:   Rectal tube care: See Rectal Tube Care orders:   Other orders:    RSKIN: CURRENT (X) ORDERED (O):   Q shift Pierre:  X  Q shift pressure point assessments:  X  Pressure redistribution mattress  X          BECKIE          Bariatric BECKIE         Bariatric foam           Heel float boots          Float Heels off Bed with Pillows               Barrier wipes         Barrier Cream         Barrier paste          Sacral silicone dressing         Silicone O2 tubing         Anchorfast         Cannula fixation Device (Tender )          Gray Foam Ear protectors           Trach with Optifoam split foam                 Waffle cushion        Waffle Overlay         Rectal tube or BMS         Antifungal tx      Interdry          Reposition q 2 hours    X    Up to chair        Ambulate      PT/OT        Dietician        Diabetes Education      PO  X  TF     TPN     NPO   # days   Other        WOUND TEAM PLAN OF CARE (X):   NPWT change 3 x week:        Dressing changes by wound team:       Follow up as needed:    X   Other (explain):     Anticipated discharge plans (X):   SNF:   X        Home Care:           Outpatient Wound Center:            Self Care:            Other:

## 2019-06-29 NOTE — CARE PLAN
Problem: Communication  Goal: The ability to communicate needs accurately and effectively will improve  Outcome: PROGRESSING AS EXPECTED    Intervention: [place] patient and significant other/support system to call light to alert staff of needs  Patient oriented to call light system and has been able to communicate needs accurately and effectively.      Problem: Safety  Goal: Will remain free from falls  Outcome: PROGRESSING AS EXPECTED    Intervention: Assess risk factors for falls  Patient has been assessed for fall risks and fall precautions have been put in place.

## 2019-06-29 NOTE — PROGRESS NOTES
Orders reviewed, and order for sitter at bed side still active. Charge nurse notified of order. Charge nurse attempting to clarify reason for order due to no legal hold.

## 2019-06-29 NOTE — CARE PLAN
Problem: Safety  Goal: Will remain free from falls  Outcome: PROGRESSING AS EXPECTED  Bed in lowest position with call light and bedside table within reach. Instructed patient to use call light for assistance, no evidence of understanding.

## 2019-06-29 NOTE — PROGRESS NOTES
MD Poe paged regarding active order for sitter at bed side, Patient's care discussed with MD. MD gave verbal orders to discontinue active order.

## 2019-06-29 NOTE — PROGRESS NOTES
2 RN skin check complete with MARIBELL Ma.   Devices in place BL Soft Wrist Restraints, Silicone Oxygen Tubing.  Skin assessed under devices YES.  Wound consult placed for skin tears, chest wounds.  The following interventions in place Q2hr turns, waffle cushion, pillows for positioning, foam pads on fragile skin, barrier cream utilized      FACE/NECK: Intact     BACK of HEAD: Wound with 2 Staples, Clean, Dry, Intact     EARS: Blanching      CHEST: Upper Chest Bruise, Left Chest Wall Pacer Site with Skin Tear from picking-Dressing In Place, Mid Neck Skin Tear from picking-Dressing In Place. Dr. Phelps aware and came to check wound. MD stated to follow wound care recommendations.       ABDOMEN: Scattered Bruising      BACK: Mid Back Skin Tear Dressing in Place       BUTTOCKS/SACRUM: Blanching Redness      ARMS: BL Wrist Soft Wrist Restraints, Skin Assessed Under Retraint, noted to be intact.  Skin Tear on Right Lower Arm-Dressing In Place, Skin Tear on Left Upper Arm-Dressing In Place, BL Arms very fragile skin, generalized bruising.       LEGS: Intact     FEET: Heels boggy & blanching, elevated on pillows.

## 2019-06-29 NOTE — PROGRESS NOTES
[facility] Medicine Daily Progress Note    Date of Service  6/29/2019    Chief Complaint  89 y.o. 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 Cardiology consulted. Echocardiogram unremarkable. Discussion was done with her son who is power of  and agreeable for pacemaker placement.        Interval Problem Update  6/22. p patient has been pleasantly confused.  Patient has dementia at baseline.  Patient overall has no significant acute event.  Remains stable and complains of general fatigue and body achiness. Patient's pain is general, 2-3/10, intermittent and does not radiate to other location, sharp and with some tingling. Can be controlled by pain meds.   6/23. Patient became more more agitated during the day.  Patient required Haldol and for agitation.  Patient otherwise is very confused and unable to finish review of system.  Patient has no fever, chills, nausea vomiting diarrhea observed overnight.  6/24. Patient is still very delirious.  Patient is still confused.  Speech evaluation showing patient is able to take p.o.  Patient complains of general body achiness. Patient's pain is general 2-3/10, intermittent and does not radiate to other location, sharp and with some tingling. Can be controlled by pain meds.   6/25.  Patient still confused and patient's left chest pacer wound still not healing well enough.  Patient like to undress the wound by herself without notifying nursing staff.  Concern of wound infection if discharged to assisted living prematurely. Patient otherwise denies fever, chills, nausea, vomiting, adb pain, SOB, CP, headache, constipation, diarrhea, cough, or sputum.  6/26. Patient still very confused and still require restraint.. Patient remained  afebrile and no signs of wound infection.  Patient still complains of general fatigue and body achiness.  Patient has left chest wound and no significant pain. Patient's pain is general, 2-3/10, intermittent and does not radiate to other location, sharp and with some tingling. Can be controlled by pain meds. Dressing in place.  6/27.  Patient is calmer than yesterday but still require restraint to prevent damage her own pacer pocket incision.  Patient still very confused. Patient otherwise denies fever, chills, nausea, vomiting, adb pain, SOB, CP, headache, constipation, diarrhea, cough, or sputum.  6/28.  Patient wound has been checked today, does not have signs of hematoma or infection.  Still on restrain to protect damage of the incision.  Pending wound care recommendation.  Due to dementia unable to finish review of system.  6/29.  Patient is calm and stable overnight.  We will try to take patient off restraints today.  Still pending placement.. Patient otherwise denies fever, chills, nausea, vomiting, adb pain, SOB, CP, headache, constipation, diarrhea, cough, or sputum.    Consultants/Specialty  Cardiology     Code Status  Full code     Disposition  Back to patient assisted living when pacer wound started healing better    Review of Systems  Review of Systems   Unable to perform ROS: Dementia        Physical Exam  Temp:  [36.2 °C (97.1 °F)-36.6 °C (97.9 °F)] 36.2 °C (97.1 °F)  Pulse:  [53-64] 64  Resp:  [16-20] 17  BP: (123-174)/(55-76) 150/76  SpO2:  [94 %-98 %] 94 %    Physical Exam   Constitutional: She appears well-developed and well-nourished.   Frail, hard of hearing.    HENT:   Head: Normocephalic and atraumatic.   Nose: Nose normal.   Mouth/Throat: No oropharyngeal exudate.   Eyes: Pupils are equal, round, and reactive to light. EOM are normal.   Neck: Normal range of motion. Neck supple. No tracheal deviation present.   Cardiovascular: Normal rate and regular rhythm.  Exam reveals no gallop and no  friction rub.    Murmur heard.   Systolic murmur is present with a grade of 3/6   Pulmonary/Chest: Effort normal. No respiratory distress. She has no wheezes. She has no rales. She exhibits no tenderness.   Abdominal: Soft. Bowel sounds are normal. She exhibits no mass. There is no tenderness. There is no rebound.   Musculoskeletal: Normal range of motion. She exhibits no tenderness.   Lymphadenopathy:     She has no cervical adenopathy.   Neurological: She is alert. No cranial nerve deficit.   Hard of hearing. Cranial nerve grossly intact    Skin: Skin is warm. No erythema.   Multiple bruises,Patient's pacer pocket showing no signs of infection.   Psychiatric: Her behavior is normal.       Fluids  No intake or output data in the 24 hours ending 06/29/19 1457    Laboratory                        Imaging  DX-CHEST-LIMITED (1 VIEW)   Final Result      1.  There is slight improvement in changes of interstitial edema with trace amount of pleural fluid.   2.  Pacemaker lead projects over the cardiac silhouette. There is no visible pneumothorax.      DX-CHEST-PORTABLE (1 VIEW)   Final Result         1.  Pulmonary edema and/or infiltrates are identified, which appear somewhat increased since the prior exam.   2.  Atherosclerosis      DX-CHEST-PORTABLE (1 VIEW)   Final Result      Mild worsening reticular opacification may indicate edema      New left pacer with no evidence of pneumothorax      EC-ECHOCARDIOGRAM COMPLETE W/O CONT   Final Result      DX-CHEST-PORTABLE (1 VIEW)   Final Result      No acute cardiac or pulmonary abnormality is noted.      CL-PERMANENT PACEMAKER INSERTION    (Results Pending)        Assessment/Plan  AV block- (present on admission)   Assessment & Plan    Pacer implantation 6/21  Echo unremarkable   Syncope on admission currently resolved  Follow-up as outpatient as needed in pacer clinic.  Pacer pocket incision showing no signs of hematoma or infection.  Pending wound care  recommendation.  Patient wound is healing, we will try to take off restrain see if patient can tolerate.  Pending placement.       Normocytic anemia- (present on admission)   Assessment & Plan    No acute bleeding  Iron panel/b12/ferritin/folic acid to follow  HH stable      Syncopal episodes- (present on admission)   Assessment & Plan    Seems to cardiogenic reason. Continue tele monitoring at this time   Resolved  Pacer in place and working appropriately     Stage 3 chronic kidney disease (HCC)- (present on admission)   Assessment & Plan    She has history of chronic kidney disease.  Avoid nephrotoxins per  Medication doses as per renal function.  Continue to monitor  Close monitor able to take p.o.  Kidney function remained relatively stable     Hypothyroidism- (present on admission)   Assessment & Plan    TSH low/free t4 elevated  I did decrease levothyroxine 175---> 150 mcg daily. High risk of over correction of her thyroid on her age   Continue to monitor      Dementia:  -Patient has severe underlying dementia  Patient become very delirious during the day.  Very agitated during the daytime, require Im Haldol 1 mg as needed  One-to-one sitter  Difficult discharge,  to help, pending acceptance by assisted-living  Pacer pocket incision showing no signs of hematoma or infection.  Pending wound care recommendation.  Patient wound is healing, we will try to take off restrain see if patient can tolerate.  Pending placement.    VTE prophylaxis: heparin       Current Facility-Administered Medications:   •  haloperidol lactate (HALDOL) injection 1 mg, 1 mg, Intramuscular, BID PRN, Lizzy Carcamo M.D., 1 mg at 06/29/19 0052  •  risperiDONE (RISPERDAL) tablet 0.25 mg, 0.25 mg, Oral, Q EVENING, Lizzy Carcamo M.D., 0.25 mg at 06/28/19 1643  •  levothyroxine (SYNTHROID) tablet 150 mcg, 150 mcg, Oral, AM ES, Nichole Heller M.D., 150 mcg at 06/29/19 4033  •  aspirin (ASA) chewable tab 81 mg, 81 mg, Oral, DAILY, Leanne Caraballo  MAIRA Poe, 81 mg at 06/29/19 0544  •  citalopram (CELEXA) tablet 20 mg, 20 mg, Oral, DAILY, Leanne Poe M.D., 20 mg at 06/29/19 0544  •  traZODone (DESYREL) tablet 50 mg, 50 mg, Oral, HS PRN, Leanne Poe M.D., 50 mg at 06/28/19 2116  •  senna-docusate (PERICOLACE or SENOKOT S) 8.6-50 MG per tablet 2 Tab, 2 Tab, Oral, BID, Stopped at 06/28/19 1800 **AND** polyethylene glycol/lytes (MIRALAX) PACKET 1 Packet, 1 Packet, Oral, QDAY PRN **AND** magnesium hydroxide (MILK OF MAGNESIA) suspension 30 mL, 30 mL, Oral, QDAY PRN **AND** bisacodyl (DULCOLAX) suppository 10 mg, 10 mg, Rectal, QDAY PRN, Leanne Poe M.D.  •  Respiratory Care per Protocol, , Nebulization, Continuous RT, Leanne Poe M.D.  •  heparin injection 5,000 Units, 5,000 Units, Subcutaneous, Q8HRS, Leanne Poe M.D., 5,000 Units at 06/29/19 0544  •  acetaminophen (TYLENOL) tablet 650 mg, 650 mg, Oral, Q6HRS PRN, Leanne Poe M.D., 650 mg at 06/24/19 1223

## 2019-06-29 NOTE — PROGRESS NOTES
2 RN skin check complete with MARIBELL Hall.  Devices in place: Bilateral soft wrist restraints, silicone oxygen tubing, bilateral hearing aides.  Skin assessed under devices: Yes.  Confirmed pressure ulcers found on: N/A.  New potential pressure ulcers noted on: N/A. Wound consult placed for skin tears.  The following interventions in place: Q2H turning, waffle cushion in place, pillows in use for positioning, preventative dressings on elbows, barrier cream and wipes in use, changing bedding frequently for incontinence, checking restraints Q2H, dressings in place for wounds     Head has a wound with 2 staples, clean, dry, intact  Bilateral ears pink and blanching, foam in place and silicone oxygen tubing in use  Generalized bruising on trunk and bilateral upper extremities  Skin tear on left upper arm, dressing in place  Skin tear on right lower arm, dressing in place  Skin tear on upper back, dressing in place  Skin tear on neck, new dressing placed  Skin tear over pacemaker site, new dressing in place  Bilateral legs dry, but intact  Bilateral heels are red and blanching  Sacrum is red, intact, and blanching

## 2019-06-30 PROCEDURE — A9270 NON-COVERED ITEM OR SERVICE: HCPCS | Performed by: INTERNAL MEDICINE

## 2019-06-30 PROCEDURE — 770020 HCHG ROOM/CARE - TELE (206)

## 2019-06-30 PROCEDURE — 700111 HCHG RX REV CODE 636 W/ 250 OVERRIDE (IP): Performed by: INTERNAL MEDICINE

## 2019-06-30 PROCEDURE — 99232 SBSQ HOSP IP/OBS MODERATE 35: CPT | Performed by: INTERNAL MEDICINE

## 2019-06-30 PROCEDURE — 700102 HCHG RX REV CODE 250 W/ 637 OVERRIDE(OP): Performed by: INTERNAL MEDICINE

## 2019-06-30 RX ADMIN — HEPARIN SODIUM 5000 UNITS: 5000 INJECTION, SOLUTION INTRAVENOUS; SUBCUTANEOUS at 13:18

## 2019-06-30 RX ADMIN — CITALOPRAM HYDROBROMIDE 20 MG: 20 TABLET ORAL at 06:34

## 2019-06-30 RX ADMIN — HEPARIN SODIUM 5000 UNITS: 5000 INJECTION, SOLUTION INTRAVENOUS; SUBCUTANEOUS at 06:34

## 2019-06-30 RX ADMIN — HEPARIN SODIUM 5000 UNITS: 5000 INJECTION, SOLUTION INTRAVENOUS; SUBCUTANEOUS at 20:40

## 2019-06-30 RX ADMIN — RISPERIDONE 0.25 MG: 0.5 TABLET ORAL at 16:21

## 2019-06-30 RX ADMIN — LEVOTHYROXINE SODIUM 150 MCG: 75 TABLET ORAL at 06:33

## 2019-06-30 RX ADMIN — ACETAMINOPHEN 650 MG: 325 TABLET, FILM COATED ORAL at 21:34

## 2019-06-30 RX ADMIN — TRAZODONE HYDROCHLORIDE 50 MG: 50 TABLET ORAL at 21:34

## 2019-06-30 RX ADMIN — ASPIRIN 81 MG 81 MG: 81 TABLET ORAL at 06:34

## 2019-06-30 RX ADMIN — SENNOSIDES AND DOCUSATE SODIUM 2 TABLET: 8.6; 5 TABLET ORAL at 06:33

## 2019-06-30 NOTE — PROGRESS NOTES
[facility] Medicine Daily Progress Note    Date of Service  6/30/2019    Chief Complaint  89 y.o. 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 Cardiology consulted. Echocardiogram unremarkable. Discussion was done with her son who is power of  and agreeable for pacemaker placement.        Interval Problem Update  6/22. p patient has been pleasantly confused.  Patient has dementia at baseline.  Patient overall has no significant acute event.  Remains stable and complains of general fatigue and body achiness. Patient's pain is general, 2-3/10, intermittent and does not radiate to other location, sharp and with some tingling. Can be controlled by pain meds.   6/23. Patient became more more agitated during the day.  Patient required Haldol and for agitation.  Patient otherwise is very confused and unable to finish review of system.  Patient has no fever, chills, nausea vomiting diarrhea observed overnight.  6/24. Patient is still very delirious.  Patient is still confused.  Speech evaluation showing patient is able to take p.o.  Patient complains of general body achiness. Patient's pain is general 2-3/10, intermittent and does not radiate to other location, sharp and with some tingling. Can be controlled by pain meds.   6/25.  Patient still confused and patient's left chest pacer wound still not healing well enough.  Patient like to undress the wound by herself without notifying nursing staff.  Concern of wound infection if discharged to assisted living prematurely. Patient otherwise denies fever, chills, nausea, vomiting, adb pain, SOB, CP, headache, constipation, diarrhea, cough, or sputum.  6/26. Patient still very confused and still require restraint.. Patient remained  afebrile and no signs of wound infection.  Patient still complains of general fatigue and body achiness.  Patient has left chest wound and no significant pain. Patient's pain is general, 2-3/10, intermittent and does not radiate to other location, sharp and with some tingling. Can be controlled by pain meds. Dressing in place.  6/27.  Patient is calmer than yesterday but still require restraint to prevent damage her own pacer pocket incision.  Patient still very confused. Patient otherwise denies fever, chills, nausea, vomiting, adb pain, SOB, CP, headache, constipation, diarrhea, cough, or sputum.  6/28.  Patient wound has been checked today, does not have signs of hematoma or infection.  Still on restrain to protect damage of the incision.  Pending wound care recommendation.  Due to dementia unable to finish review of system.  6/29.  Patient is calm and stable overnight.  We will try to take patient off restraints today.  Still pending placement.. Patient otherwise denies fever, chills, nausea, vomiting, adb pain, SOB, CP, headache, constipation, diarrhea, cough, or sputum.  6/30. Patient still frequently pick her own pacer pocket incision.  Still require restraint.  Patient's wound has not been healing very well to be able to keep  patient off restraint.  Patient still very confused.  However patient is pleasant and cooperative.    Consultants/Specialty  Cardiology     Code Status  Full code     Disposition  Back to patient assisted living when pacer wound started healing better    Review of Systems  Review of Systems   Unable to perform ROS: Dementia        Physical Exam  Temp:  [36.2 °C (97.1 °F)-36.6 °C (97.9 °F)] 36.4 °C (97.5 °F)  Pulse:  [59-64] 61  Resp:  [16-17] 16  BP: (125-179)/(53-97) 169/63  SpO2:  [92 %-98 %] 95 %    Physical Exam   Constitutional: She appears well-developed.   Frail, hard of hearing.    HENT:   Head: Normocephalic and atraumatic.   Nose: Nose normal.   Mouth/Throat: No oropharyngeal  exudate.   Eyes: Pupils are equal, round, and reactive to light. EOM are normal.   Neck: Normal range of motion. Neck supple. No thyromegaly present.   Cardiovascular: Normal rate and regular rhythm.  Exam reveals no gallop.    Murmur heard.   Systolic murmur is present with a grade of 3/6   Pulmonary/Chest: Effort normal. No respiratory distress. She has no rales.   Abdominal: Soft. Bowel sounds are normal. She exhibits no distension and no mass. There is no rebound.   Musculoskeletal: Normal range of motion. She exhibits no tenderness.   Lymphadenopathy:     She has no cervical adenopathy.   Neurological: She is alert. No cranial nerve deficit.   Hard of hearing. Cranial nerve grossly intact    Skin: Skin is warm. She is not diaphoretic. No erythema.   Multiple bruises,Patient's pacer pocket showing no signs of infection.   Psychiatric: Her behavior is normal.       Fluids    Intake/Output Summary (Last 24 hours) at 06/30/19 1538  Last data filed at 06/29/19 2000   Gross per 24 hour   Intake               30 ml   Output                0 ml   Net               30 ml       Laboratory                        Imaging  DX-CHEST-LIMITED (1 VIEW)   Final Result      1.  There is slight improvement in changes of interstitial edema with trace amount of pleural fluid.   2.  Pacemaker lead projects over the cardiac silhouette. There is no visible pneumothorax.      DX-CHEST-PORTABLE (1 VIEW)   Final Result         1.  Pulmonary edema and/or infiltrates are identified, which appear somewhat increased since the prior exam.   2.  Atherosclerosis      DX-CHEST-PORTABLE (1 VIEW)   Final Result      Mild worsening reticular opacification may indicate edema      New left pacer with no evidence of pneumothorax      EC-ECHOCARDIOGRAM COMPLETE W/O CONT   Final Result      DX-CHEST-PORTABLE (1 VIEW)   Final Result      No acute cardiac or pulmonary abnormality is noted.      CL-PERMANENT PACEMAKER INSERTION    (Results Pending)         Assessment/Plan  AV block- (present on admission)   Assessment & Plan    Pacer implantation 6/21  Echo unremarkable   Syncope on admission currently resolved  Follow-up as outpatient as needed in pacer clinic.  Pacer pocket incision showing no signs of hematoma or infection.  Pending wound care recommendation.  Patient wound is healing, but still not able to keep patient off restraints because wound is not 100% healed  Need to be off restrain before able to be placed to nursing care facility   Normocytic anemia- (present on admission)   Assessment & Plan    No acute bleeding  Iron panel/b12/ferritin/folic acid to follow  HH stable      Syncopal episodes- (present on admission)   Assessment & Plan    Seems to cardiogenic reason. Continue tele monitoring at this time   Resolved  Pacer in place and working appropriately     Stage 3 chronic kidney disease (HCC)- (present on admission)   Assessment & Plan    She has history of chronic kidney disease.  Avoid nephrotoxins per  Medication doses as per renal function.  Continue to monitor  Close monitor able to take p.o.  Kidney function remained relatively stable     Hypothyroidism- (present on admission)   Assessment & Plan    TSH low/free t4 elevated  I did decrease levothyroxine 175---> 150 mcg daily. High risk of over correction of her thyroid on her age   Continue to monitor      Dementia:  -Patient has severe underlying dementia  Patient become very delirious during the day.  Very agitated during the daytime, require Im Haldol 1 mg as needed  One-to-one sitter  Difficult discharge,  to help, pending acceptance by assisted-living  Pacer pocket incision showing no signs of hematoma or infection.  Pending wound care recommendation.  Patient wound is healing, but not 100% healed patient cannot be off restraints at this moment need to protect patient from wound infection     VTE prophylaxis: heparin       Current Facility-Administered Medications:   •   haloperidol lactate (HALDOL) injection 1 mg, 1 mg, Intramuscular, BID PRN, Lizzy Carcamo M.D., 1 mg at 06/29/19 0052  •  risperiDONE (RISPERDAL) tablet 0.25 mg, 0.25 mg, Oral, Q EVENING, Lizzy Carcamo M.D., 0.25 mg at 06/29/19 1649  •  levothyroxine (SYNTHROID) tablet 150 mcg, 150 mcg, Oral, AM ES, Nichole Heller M.D., 150 mcg at 06/30/19 0633  •  aspirin (ASA) chewable tab 81 mg, 81 mg, Oral, DAILY, Leanne Poe M.D., 81 mg at 06/30/19 0634  •  citalopram (CELEXA) tablet 20 mg, 20 mg, Oral, DAILY, Leanne Poe M.D., 20 mg at 06/30/19 0634  •  traZODone (DESYREL) tablet 50 mg, 50 mg, Oral, HS PRN, Leanne Poe M.D., 50 mg at 06/29/19 2214  •  senna-docusate (PERICOLACE or SENOKOT S) 8.6-50 MG per tablet 2 Tab, 2 Tab, Oral, BID, 2 Tab at 06/30/19 0633 **AND** polyethylene glycol/lytes (MIRALAX) PACKET 1 Packet, 1 Packet, Oral, QDAY PRN **AND** magnesium hydroxide (MILK OF MAGNESIA) suspension 30 mL, 30 mL, Oral, QDAY PRN **AND** bisacodyl (DULCOLAX) suppository 10 mg, 10 mg, Rectal, QDAY PRN, Leanne Poe M.D.  •  Respiratory Care per Protocol, , Nebulization, Continuous RT, Leanne Poe M.D.  •  heparin injection 5,000 Units, 5,000 Units, Subcutaneous, Q8HRS, Leanne Poe M.D., 5,000 Units at 06/30/19 1318  •  acetaminophen (TYLENOL) tablet 650 mg, 650 mg, Oral, Q6HRS PRN, Leanne Poe M.D., 650 mg at 06/24/19 5941

## 2019-06-30 NOTE — CARE PLAN
Problem: Safety  Goal: Will remain free from injury  Outcome: PROGRESSING AS EXPECTED      Problem: Skin Integrity  Goal: Risk for impaired skin integrity will decrease  Outcome: PROGRESSING SLOWER THAN EXPECTED      Problem: Mobility  Goal: Risk for activity intolerance will decrease  Outcome: PROGRESSING SLOWER THAN EXPECTED

## 2019-06-30 NOTE — PROGRESS NOTES
Received report from MARIBELL Hay     Patient appears to be agitated and yelling out. Patient is awake, alert and oriented X1.   Wrist restraints are in place due to patient excessively picking at skin tears.   Patient is currently paced on the monitor.   Plan of care reviewed with patient, no further questions at this time.   Bed is locked and in lowest position with call light in reach of the patient.   RN continuing to monitor.

## 2019-06-30 NOTE — CARE PLAN
Problem: Infection  Goal: Will remain free from infection  Outcome: PROGRESSING SLOWER THAN EXPECTED  Educated patient on the importance of good hand hygiene for self and staff, no evidence of understanding.

## 2019-06-30 NOTE — PROGRESS NOTES
2 rn skin assessment completed with Savage RN     Devices in place: NC, tele leads, bilateral wrist restraints    Skin assessed under skin: Yes   Confirmed pressure ulcers: N/A  New potential pressure ulcers: N/A   The following interventions in place: Silicone oxygen tubing behind ears, turning Q2 hours, waffle cushion in place, pillows for positioning and under elbows/ floating heels, non-adhering dressing covered with foam dressing over skin tears, Barrier cream applied for incontinence, frequent linen changes, checking restraints Q2 hours    Staples in back of head: clean, dry and intact, LEXI   Bilateral ears: pink and blanching with silicone tubing   Generalized bruising on chest, back, BUE, and abd   Skin tears: over pacemaker, neck, L side of chest,new dressings applied   L upper arm with transparent dressing, clean dry and intact  R forearm with transparent dressing, clean dry and intact  Upper back, transparent dressing on, clean dry and intact  BLE are dry and pale but intact, lotion applied   Bilateral heels: red and blanching   Sacrum: red and blanching, barrier cream applied     RN continuing to monitor

## 2019-07-01 PROBLEM — G30.1 LATE ONSET ALZHEIMER'S DISEASE WITH BEHAVIORAL DISTURBANCE (HCC): Status: ACTIVE | Noted: 2019-07-01

## 2019-07-01 PROBLEM — F02.818 LATE ONSET ALZHEIMER'S DISEASE WITH BEHAVIORAL DISTURBANCE (HCC): Status: ACTIVE | Noted: 2019-07-01

## 2019-07-01 PROCEDURE — 700102 HCHG RX REV CODE 250 W/ 637 OVERRIDE(OP): Performed by: INTERNAL MEDICINE

## 2019-07-01 PROCEDURE — 700111 HCHG RX REV CODE 636 W/ 250 OVERRIDE (IP): Performed by: INTERNAL MEDICINE

## 2019-07-01 PROCEDURE — 92526 ORAL FUNCTION THERAPY: CPT

## 2019-07-01 PROCEDURE — 99232 SBSQ HOSP IP/OBS MODERATE 35: CPT | Performed by: INTERNAL MEDICINE

## 2019-07-01 PROCEDURE — A9270 NON-COVERED ITEM OR SERVICE: HCPCS | Performed by: INTERNAL MEDICINE

## 2019-07-01 PROCEDURE — 302146: Performed by: FAMILY MEDICINE

## 2019-07-01 PROCEDURE — 770006 HCHG ROOM/CARE - MED/SURG/GYN SEMI*

## 2019-07-01 RX ADMIN — SENNOSIDES AND DOCUSATE SODIUM 2 TABLET: 8.6; 5 TABLET ORAL at 05:15

## 2019-07-01 RX ADMIN — ASPIRIN 81 MG 81 MG: 81 TABLET ORAL at 05:16

## 2019-07-01 RX ADMIN — SENNOSIDES AND DOCUSATE SODIUM 2 TABLET: 8.6; 5 TABLET ORAL at 18:17

## 2019-07-01 RX ADMIN — HEPARIN SODIUM 5000 UNITS: 5000 INJECTION, SOLUTION INTRAVENOUS; SUBCUTANEOUS at 14:40

## 2019-07-01 RX ADMIN — HEPARIN SODIUM 5000 UNITS: 5000 INJECTION, SOLUTION INTRAVENOUS; SUBCUTANEOUS at 23:14

## 2019-07-01 RX ADMIN — HEPARIN SODIUM 5000 UNITS: 5000 INJECTION, SOLUTION INTRAVENOUS; SUBCUTANEOUS at 05:16

## 2019-07-01 RX ADMIN — LEVOTHYROXINE SODIUM 150 MCG: 75 TABLET ORAL at 05:16

## 2019-07-01 RX ADMIN — CITALOPRAM HYDROBROMIDE 20 MG: 20 TABLET ORAL at 05:16

## 2019-07-01 RX ADMIN — RISPERIDONE 0.25 MG: 0.5 TABLET ORAL at 18:15

## 2019-07-01 NOTE — THERAPY
"Speech Language Therapy dysphagia treatment completed.   Functional Status:  The patient was seen for dysphagia therapy this date. Patient slouched down in bed yelling for \"help\" upon SLP entering room. CNA and SLP repositioned patient upright in bed for therapy session however, patient persisted to yell out for help. Hearing aids were located and placed for therapy session, although did not appear to eliminate hearing difficulties patient did appear to calm down and no longer yelled for help. The patient was assisted with D2/thin liquid meal tray. The patient consumed D2/thin liquid meal items with slightly prolonged mastication however, appears functional. Patient was noted to have rapid intake when allowed to self feed which resulted in increased wet upper airway noise and delayed coughing. When 1:1 feeding was completed patient had no further episodes of coughing or concerns for aspiration. Patient consumed less than 25% of meal tray this session.     Recommendations: 1) Continue a D2/thin liquid diet given 1:1 feeding    Plan of Care: Will benefit from Speech Therapy 3 times per week    Post-Acute Therapy: Recommend inpatient transitional care services for continued speech therapy services.        See \"Rehab Therapy-Acute\" Patient Summary Report for complete documentation.     "

## 2019-07-01 NOTE — PROGRESS NOTES
2 RN skin check complete.   Devices in place NC, tele leads.  Skin assessed under devices yes.  Confirmed pressure ulcers found on N/A.  New potential pressure ulcers noted on N/A. Wound consult placed N/A.  The following interventions in place: grey oxygen foam used behind ears, turning patient Q2 hours, waffle cushion in place, frequent linen changes, pillows for positioning and under elbows, pt refused to have pillow under heels, non-adhering dressing covered with a foam dressing over skin tears, barrier cream applied, checking restraints Q2 hours for circulation and comfort.     Staple in back of the head: clean, dry, intact, LEXI  Bilateral ears: pink and blanching with silicone tubing   Generalized bruising on chest, back, BUE, and abd   Skin tears: over pacemaker, neck, L side of chest,new dressings applied   L upper arm with transparent dressing, clean dry and intact  R forearm with transparent dressing, clean dry and intact  Upper back, transparent dressing on, clean dry and intact  BLE are dry and pale but intact, lotion applied   Bilateral heels: red and blanching   Sacrum: red and blanching, barrier cream applied      RN continuing to monitor

## 2019-07-01 NOTE — PROGRESS NOTES
Full report called to [place] [id number], spoke with MARIBELL Kendrick- gave the opportunity to ask questions.     1643: Pt and all belongings, including bilateral hearing aids and glasses, escorted off the floor for transfer to [place] [address], via bed, accompanied by hospital transport.

## 2019-07-01 NOTE — PROGRESS NOTES
RN skin check complete with  Devices in place: Glasses, Nasal Canula, Restraints  Skin assessed under devices: Yes, Intact  Confirmed pressure ulcers found on: N/A.  New potential pressure ulcers noted on: N/A. Wound consult placed: N/A.  The following interventions in place: grey oxygen foam used behind ears, turning patient Q2 hours, waffle cushion in place, heels elevated off bed, barrier cream applied, assessing skin under restraints Q2 hours and performing ROM.      Staple in back of the head removed, per MD order. Skin CDI.  Bilateral ears: pink and blanching with silicone tubing in place  Generalized bruising.  Skin tears: over pacemaker and upper chest/neck with new dressings applied.  L upper arm skin tear with transparent dressing, clean dry and intact  R forearm skin tear with transparent dressing, clean dry and intact  Upper back healing scab  Bilateral heels: red and blanching. Heels elevated off mattress   Sacrum: red and blanching, barrier cream applied

## 2019-07-01 NOTE — PROGRESS NOTES
[facility] Medicine Daily Progress Note    Date of Service  7/1/2019    Chief Complaint  89 y.o. 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 Cardiology consulted. Echocardiogram unremarkable. Discussion was done with her son who is power of  and agreeable for pacemaker placement.        Interval Problem Update  6/22. p patient has been pleasantly confused.  Patient has dementia at baseline.  Patient overall has no significant acute event.  Remains stable and complains of general fatigue and body achiness. Patient's pain is general, 2-3/10, intermittent and does not radiate to other location, sharp and with some tingling. Can be controlled by pain meds.   6/23. Patient became more more agitated during the day.  Patient required Haldol and for agitation.  Patient otherwise is very confused and unable to finish review of system.  Patient has no fever, chills, nausea vomiting diarrhea observed overnight.  6/24. Patient is still very delirious.  Patient is still confused.  Speech evaluation showing patient is able to take p.o.  Patient complains of general body achiness. Patient's pain is general 2-3/10, intermittent and does not radiate to other location, sharp and with some tingling. Can be controlled by pain meds.   6/25.  Patient still confused and patient's left chest pacer wound still not healing well enough.  Patient like to undress the wound by herself without notifying nursing staff.  Concern of wound infection if discharged to assisted living prematurely. Patient otherwise denies fever, chills, nausea, vomiting, adb pain, SOB, CP, headache, constipation, diarrhea, cough, or sputum.  6/26. Patient still very confused and still require restraint.. Patient remained  afebrile and no signs of wound infection.  Patient still complains of general fatigue and body achiness.  Patient has left chest wound and no significant pain. Patient's pain is general, 2-3/10, intermittent and does not radiate to other location, sharp and with some tingling. Can be controlled by pain meds. Dressing in place.  6/27.  Patient is calmer than yesterday but still require restraint to prevent damage her own pacer pocket incision.  Patient still very confused. Patient otherwise denies fever, chills, nausea, vomiting, adb pain, SOB, CP, headache, constipation, diarrhea, cough, or sputum.  6/28.  Patient wound has been checked today, does not have signs of hematoma or infection.  Still on restrain to protect damage of the incision.  Pending wound care recommendation.  Due to dementia unable to finish review of system.  6/29.  Patient is calm and stable overnight.  We will try to take patient off restraints today.  Still pending placement.. Patient otherwise denies fever, chills, nausea, vomiting, adb pain, SOB, CP, headache, constipation, diarrhea, cough, or sputum.  6/30. Patient still frequently pick her own pacer pocket incision.  Still require restraint.  Patient's wound has not been healing very well to be able to keep  patient off restraint.  Patient still very confused.  However patient is pleasant and cooperative.  7/1.  Patient currently still very confused and still require restraint.  Patient's wound is still not healed appropriately but no signs of infection. Patient otherwise denies fever, chills, nausea, vomiting, adb pain, SOB, CP, headache, constipation, diarrhea, cough, or sputum.      Consultants/Specialty  Cardiology     Code Status  Full code     Disposition  Back to patient assisted living when pacer wound started healing better    Review of Systems  Review of Systems   Unable to perform ROS: Dementia        Physical Exam  Temp:  [36 °C (96.8 °F)-37 °C (98.6 °F)] 36.2 °C (97.2  °F)  Pulse:  [60-62] 60  Resp:  [14-20] 20  BP: (122-158)/(57-67) 155/64  SpO2:  [95 %-99 %] 98 %    Physical Exam   Constitutional: She appears well-nourished. No distress.   Frail, hard of hearing.    HENT:   Right Ear: External ear normal.   Left Ear: External ear normal.   Nose: Nose normal.   Mouth/Throat: No oropharyngeal exudate.   Eyes: Pupils are equal, round, and reactive to light. EOM are normal.   Neck: Normal range of motion. Neck supple. No tracheal deviation present.   Cardiovascular: Normal rate and regular rhythm.  Exam reveals no friction rub.    Murmur heard.   Systolic murmur is present with a grade of 3/6   Pulmonary/Chest: Effort normal. No respiratory distress. She has no rales.   Abdominal: Soft. Bowel sounds are normal. She exhibits no distension and no mass. There is no tenderness. There is no rebound.   Musculoskeletal: Normal range of motion. She exhibits no edema or tenderness.   Lymphadenopathy:     She has no cervical adenopathy.   Neurological: She is alert. No cranial nerve deficit.   Hard of hearing. Cranial nerve grossly intact    Skin: Skin is warm. No rash noted.   Multiple bruises,Patient's pacer pocket showing no signs of infection.   Psychiatric: Her behavior is normal.       Fluids    Intake/Output Summary (Last 24 hours) at 07/01/19 1424  Last data filed at 06/30/19 2015   Gross per 24 hour   Intake              110 ml   Output                0 ml   Net              110 ml       Laboratory                        Imaging  DX-CHEST-LIMITED (1 VIEW)   Final Result      1.  There is slight improvement in changes of interstitial edema with trace amount of pleural fluid.   2.  Pacemaker lead projects over the cardiac silhouette. There is no visible pneumothorax.      DX-CHEST-PORTABLE (1 VIEW)   Final Result         1.  Pulmonary edema and/or infiltrates are identified, which appear somewhat increased since the prior exam.   2.  Atherosclerosis      DX-CHEST-PORTABLE (1 VIEW)    Final Result      Mild worsening reticular opacification may indicate edema      New left pacer with no evidence of pneumothorax      EC-ECHOCARDIOGRAM COMPLETE W/O CONT   Final Result      DX-CHEST-PORTABLE (1 VIEW)   Final Result      No acute cardiac or pulmonary abnormality is noted.      CL-PERMANENT PACEMAKER INSERTION    (Results Pending)        Assessment/Plan  * Late onset Alzheimer's disease with behavioral disturbance- (present on admission)   Assessment & Plan    -Patient has severe underlying dementia  Patient become very delirious during the day.  Very agitated during the daytime, require Im Haldol 1 mg as needed  One-to-one sitter  Difficult discharge,  to help, pending acceptance by assisted-living  Pacer pocket incision showing no signs of hematoma or infection.  Pending wound care recommendation.  Patient wound is healing, but not 100% healed..   patient cannot be off restraints at this moment because restraint is need to protect patient from wound inf picking picking and prevent infection      AV block- (present on admission)   Assessment & Plan    Pacer implantation 6/21  Echo unremarkable   Syncope on admission currently resolved  Follow-up as outpatient as needed in pacer clinic.  Pacer pocket incision showing no signs of hematoma or infection.  Pending wound care recommendation.  Patient wound is healing, but still not able to keep patient off restraints because wound is not 100% healed  Need to be off restrain before able to be placed to nursing care facility, however patient currently require restraint to prevent wound picking.       Normocytic anemia- (present on admission)   Assessment & Plan    No acute bleeding  Iron panel/b12/ferritin/folic acid to follow      Syncopal episodes- (present on admission)   Assessment & Plan    Seems to cardiogenic reason.   Status post pacemaker placement.  Currently paced maker working appropriately.  Can be off telemetry monitor.     Stage  3 chronic kidney disease (HCC)- (present on admission)   Assessment & Plan    She has history of chronic kidney disease.  Avoid nephrotoxins per  Medication doses as per renal function.  Continue to monitor  Patient kidney function remained stable.  Currently euvolemic.       Hypothyroidism- (present on admission)   Assessment & Plan    TSH low/free t4 elevated  I did decrease levothyroxine 175---> 150 mcg daily. High risk of over correction of her thyroid on her age   Continue to monitor          VTE prophylaxis: heparin       Current Facility-Administered Medications:   •  haloperidol lactate (HALDOL) injection 1 mg, 1 mg, Intramuscular, BID PRN, Lizzy Carcamo M.D., 1 mg at 06/29/19 0052  •  risperiDONE (RISPERDAL) tablet 0.25 mg, 0.25 mg, Oral, Q EVENING, Lizzy Carcamo M.D., 0.25 mg at 06/30/19 1621  •  levothyroxine (SYNTHROID) tablet 150 mcg, 150 mcg, Oral, AM ES, Nichole Heller M.D., 150 mcg at 07/01/19 0516  •  aspirin (ASA) chewable tab 81 mg, 81 mg, Oral, DAILY, Leanne Poe M.D., 81 mg at 07/01/19 0516  •  citalopram (CELEXA) tablet 20 mg, 20 mg, Oral, DAILY, Leanne Poe M.D., 20 mg at 07/01/19 0516  •  traZODone (DESYREL) tablet 50 mg, 50 mg, Oral, HS PRN, Leanne Poe M.D., 50 mg at 06/30/19 2134  •  senna-docusate (PERICOLACE or SENOKOT S) 8.6-50 MG per tablet 2 Tab, 2 Tab, Oral, BID, 2 Tab at 07/01/19 0515 **AND** polyethylene glycol/lytes (MIRALAX) PACKET 1 Packet, 1 Packet, Oral, QDAY PRN **AND** magnesium hydroxide (MILK OF MAGNESIA) suspension 30 mL, 30 mL, Oral, QDAY PRN **AND** bisacodyl (DULCOLAX) suppository 10 mg, 10 mg, Rectal, QDAY PRN, Leanne Poe M.D.  •  Respiratory Care per Protocol, , Nebulization, Continuous RT, Leanne Poe M.D.  •  heparin injection 5,000 Units, 5,000 Units, Subcutaneous, Q8HRS, Leanne Poe M.D., 5,000 Units at 07/01/19 0516  •  acetaminophen (TYLENOL) tablet 650 mg, 650 mg, Oral, Q6HRS PRN, Leanne Poe  M.D., 650 mg at 06/30/19 1316

## 2019-07-01 NOTE — ASSESSMENT & PLAN NOTE
Patient has severe underlying dementia with behavioral disturbance. Has been calm and pleasant.   -she is very [place] likely contributing to her delusions and behaviors  -Continue celexa and seroquel  -continue with supervision, restraints only if absolutely needed as we will need them off for 24 hours prior to d/c to SNF  -PRN haldol

## 2019-07-01 NOTE — PROGRESS NOTES
Received report from MARIBELL Romano     Assumed care at 1900  Patient is resting comfortably in bed, showing no signs of pain. No family present at bedside.   Patient is V paced on the monitor  Plan of care reviewed with patient, patient nods head in agreement.   Bed is locked and in lowest position with call light in reach.   Restraints were checked for circulation and comfort.   Liquids were given at this time and patient denies a snack.     RN continuing to monitor

## 2019-07-02 ENCOUNTER — APPOINTMENT (OUTPATIENT)
Dept: RADIOLOGY | Facility: MEDICAL CENTER | Age: 84
DRG: 243 | End: 2019-07-02
Attending: NURSE PRACTITIONER
Payer: MEDICARE

## 2019-07-02 PROBLEM — Z95.0 S/P PLACEMENT OF CARDIAC PACEMAKER: Status: ACTIVE | Noted: 2019-07-02

## 2019-07-02 PROCEDURE — A9270 NON-COVERED ITEM OR SERVICE: HCPCS | Performed by: INTERNAL MEDICINE

## 2019-07-02 PROCEDURE — 99232 SBSQ HOSP IP/OBS MODERATE 35: CPT | Performed by: INTERNAL MEDICINE

## 2019-07-02 PROCEDURE — 700111 HCHG RX REV CODE 636 W/ 250 OVERRIDE (IP): Performed by: INTERNAL MEDICINE

## 2019-07-02 PROCEDURE — 700102 HCHG RX REV CODE 250 W/ 637 OVERRIDE(OP): Performed by: INTERNAL MEDICINE

## 2019-07-02 PROCEDURE — 71045 X-RAY EXAM CHEST 1 VIEW: CPT

## 2019-07-02 PROCEDURE — 770006 HCHG ROOM/CARE - MED/SURG/GYN SEMI*

## 2019-07-02 RX ADMIN — LEVOTHYROXINE SODIUM 150 MCG: 75 TABLET ORAL at 04:31

## 2019-07-02 RX ADMIN — RISPERIDONE 0.25 MG: 0.5 TABLET ORAL at 17:45

## 2019-07-02 RX ADMIN — HEPARIN SODIUM 5000 UNITS: 5000 INJECTION, SOLUTION INTRAVENOUS; SUBCUTANEOUS at 14:00

## 2019-07-02 RX ADMIN — HEPARIN SODIUM 5000 UNITS: 5000 INJECTION, SOLUTION INTRAVENOUS; SUBCUTANEOUS at 21:30

## 2019-07-02 RX ADMIN — ASPIRIN 81 MG 81 MG: 81 TABLET ORAL at 04:31

## 2019-07-02 RX ADMIN — TRAZODONE HYDROCHLORIDE 50 MG: 50 TABLET ORAL at 22:15

## 2019-07-02 RX ADMIN — CITALOPRAM HYDROBROMIDE 20 MG: 20 TABLET ORAL at 04:31

## 2019-07-02 RX ADMIN — SENNOSIDES AND DOCUSATE SODIUM 2 TABLET: 8.6; 5 TABLET ORAL at 04:31

## 2019-07-02 RX ADMIN — HEPARIN SODIUM 5000 UNITS: 5000 INJECTION, SOLUTION INTRAVENOUS; SUBCUTANEOUS at 04:31

## 2019-07-02 NOTE — PROGRESS NOTES
2 RN skin check complete with MARIBELL Jackson.   Devices in place glasses, O2 tubing, and bilateral wrist restraints  Skin assessed under devices yes.  Confirmed pressure ulcers found on n/a.  New potential pressure ulcers noted on n/a. Wound consult placed n/a.  The following interventions in place q2h turns, 2 RN skin checks, barrier wipes and cream implemented, linen and pads changed as needed, waffle mattress in use, gray foam in use. Pillow in use to float heels, adhesive foam in use.  Noted: no open wound noted. scattered bruising and scabs on BULEs, dry, flaky, discolor bilateral legs, pink and blanchable bilateral heels.

## 2019-07-02 NOTE — PROGRESS NOTES
2 RN skin check complete with Tony RN  Devices in place eye glasses,O2 tubing,bilateral wrist restraints  Skin assessed under devicesYES  Confirmed pressure ulcers found on n/a  New potential pressure ulcers noted on N/A.  The following interventions in place;every 2 hours turn,barrier wipes and jarred cream,waffle mattress in use ,gray foam on oxygen tubing in use.        BUE/BLE bruising  and scabs dry and fragile,bilateral legs discoloration,bilateral heels ping and blanching,bilateral elbow pink and blanching,coccyx red and blanching,bilateral ears and groin pink and blanching.

## 2019-07-02 NOTE — CARE PLAN
Problem: Nutritional:  Goal: Achieve adequate nutritional intake  Patient will consume >50% of meals   Outcome: PROGRESSING AS EXPECTED    Intervention: Monitor PO intake, weights, and laboratory values  Per ADL documentation PO intake is variable.  Meal and snack intake vary < 25 -75% consumed.   Remains difficult to determine if PO intake is meeting estimated nutritional needs, will continue to monitor.     RD following

## 2019-07-02 NOTE — CARE PLAN
Problem: Safety  Goal: Will remain free from injury  Outcome: PROGRESSING AS EXPECTED  Bed alarm on, call light within reach & hourly rounding in place. Bilateral wrist restraints in place & skin checked under restraints. Hourly rounding in place.    Problem: Skin Integrity  Goal: Risk for impaired skin integrity will decrease  Outcome: PROGRESSING SLOWER THAN EXPECTED  Dressing changed to L upper chest this AM. Large amount of bruising/swelling to chest. Q2 turns, waffle mattress overlay in place. Checking frequently for incontinence.

## 2019-07-02 NOTE — PROGRESS NOTES
Hospital Medicine Daily Progress Note    Date of Service  7/2/2019    Chief Complaint  Bradycardia and syncope    Hospital Course   This is a 89-year-old female with PMH CKD 3, hypothyroidism, [place] and dementia who was transferred here from outside facility for AV block thought to be Mobitz 2.  Cardiology was consulted and patient underwent PPM placement on 6/21/2019.  Her postprocedure course was complicated by ongoing confusion requiring restraints.      Interval Problem Update  -Area of concern medial to the PPM site on the sternum.  Baseball size area of questionable fluid or blood collection.  Chest x-ray shows no acute findings.  -Patient is very [place] likely contributing to her confusion.  She remains restrained.  She appears in no distress    Consultants/Specialty  Cardiology    Code Status  DNAR/DNI    Disposition  TBD    Review of Systems  Review of Systems   Unable to perform ROS: Dementia        Physical Exam  Temp:  [36.3 °C (97.3 °F)-37 °C (98.6 °F)] 36.8 °C (98.2 °F)  Pulse:  [58-64] 58  Resp:  [16-20] 16  BP: (137-162)/(50-65) 137/52  SpO2:  [93 %-95 %] 93 %    Physical Exam   Constitutional: Vital signs are normal. She appears well-nourished. She is sleeping and uncooperative. She is easily aroused. No distress.   Frail, hard of hearing.    HENT:   Head: Normocephalic.   Right Ear: Decreased hearing is noted.   Left Ear: Decreased hearing is noted.   Nose: Nose normal.   Mouth/Throat: Oropharynx is clear and moist.   Eyes: Pupils are equal, round, and reactive to light. Conjunctivae and EOM are normal. No scleral icterus.   Neck: Normal range of motion. Neck supple. No JVD present.   Cardiovascular: Normal rate and intact distal pulses.  Exam reveals no friction rub.    Murmur heard.   Systolic murmur is present with a grade of 3/6   PPM site with dressing CDI   Pulmonary/Chest: Effort normal. She has decreased breath sounds. She has no rales.   Abdominal: Soft. Bowel sounds are normal. She exhibits no  distension and no mass. There is no tenderness. There is no rebound.   Musculoskeletal: Normal range of motion. She exhibits no edema or tenderness.   Lymphadenopathy:     She has no cervical adenopathy.   Neurological: She is easily aroused. She is disoriented.   Skin: Skin is warm. No rash noted.   PPM site with dressing CDI   Psychiatric: She has a normal mood and affect. Her behavior is normal. Judgment normal. Cognition and memory are normal.       Fluids    Intake/Output Summary (Last 24 hours) at 07/02/19 1353  Last data filed at 07/02/19 1033   Gross per 24 hour   Intake              530 ml   Output                0 ml   Net              530 ml       Laboratory                         Assessment/Plan  * Late onset Alzheimer's disease with behavioral disturbance- (present on admission)   Assessment & Plan    -Patient has severe underlying dementia  -she is very [place] likely contributing to her delusions and behaviors  -Continue restraints to avoid PPM site wound contamination         AV block- (present on admission)   Assessment & Plan    -s/p PPM placement 6/21     S/P placement of cardiac pacemaker   Assessment & Plan    -6/21/2019  -Nursing had just change the dressing prior to my assessment.  I reviewed the clinical picture uploaded into epic.  -She has been afebrile without leukocytosis  -Area of concern medial to the PPM site on the sternum.  Baseball size area of questionable fluid or blood collection     Normocytic anemia- (present on admission)   Assessment & Plan    -Iron, vitamin B12, and folate all WNL  -Follow labs     Syncopal episodes- (present on admission)   Assessment & Plan    -due to AV block  -now resolved after PPM placement         Stage 3 chronic kidney disease (HCC)- (present on admission)   Assessment & Plan    -chronic  -Avoid nephrotoxins  -Renally dose medications as indicated  -Follow labs       Hypothyroidism- (present on admission)   Assessment & Plan    -TSH low/free t4  elevated  -Levothyroxine decreased from 175 to 150 on 6/22  -TSH in 4-6 weeks            VTE prophylaxis: Heparin    Yolanda Zarco A.P.R.N.

## 2019-07-02 NOTE — PROGRESS NOTES
Pt transferred to the unit with transport via hospital bed, pt on bilateral wrist restraints, [place], belonging next to patient, fall risk precautions in place. Pt alert and oriented to self only, vs taken and charted.

## 2019-07-03 PROCEDURE — A9270 NON-COVERED ITEM OR SERVICE: HCPCS | Performed by: NURSE PRACTITIONER

## 2019-07-03 PROCEDURE — A9270 NON-COVERED ITEM OR SERVICE: HCPCS | Performed by: INTERNAL MEDICINE

## 2019-07-03 PROCEDURE — 97530 THERAPEUTIC ACTIVITIES: CPT

## 2019-07-03 PROCEDURE — 97535 SELF CARE MNGMENT TRAINING: CPT

## 2019-07-03 PROCEDURE — 700102 HCHG RX REV CODE 250 W/ 637 OVERRIDE(OP): Performed by: INTERNAL MEDICINE

## 2019-07-03 PROCEDURE — 770006 HCHG ROOM/CARE - MED/SURG/GYN SEMI*

## 2019-07-03 PROCEDURE — 99232 SBSQ HOSP IP/OBS MODERATE 35: CPT | Performed by: INTERNAL MEDICINE

## 2019-07-03 PROCEDURE — 700102 HCHG RX REV CODE 250 W/ 637 OVERRIDE(OP): Performed by: NURSE PRACTITIONER

## 2019-07-03 PROCEDURE — 700111 HCHG RX REV CODE 636 W/ 250 OVERRIDE (IP): Performed by: INTERNAL MEDICINE

## 2019-07-03 RX ORDER — QUETIAPINE FUMARATE 25 MG/1
12.5 TABLET, FILM COATED ORAL DAILY
Status: DISCONTINUED | OUTPATIENT
Start: 2019-07-03 | End: 2019-07-07

## 2019-07-03 RX ADMIN — LEVOTHYROXINE SODIUM 150 MCG: 75 TABLET ORAL at 06:19

## 2019-07-03 RX ADMIN — HEPARIN SODIUM 5000 UNITS: 5000 INJECTION, SOLUTION INTRAVENOUS; SUBCUTANEOUS at 06:19

## 2019-07-03 RX ADMIN — HEPARIN SODIUM 5000 UNITS: 5000 INJECTION, SOLUTION INTRAVENOUS; SUBCUTANEOUS at 20:38

## 2019-07-03 RX ADMIN — CITALOPRAM HYDROBROMIDE 20 MG: 20 TABLET ORAL at 06:19

## 2019-07-03 RX ADMIN — HEPARIN SODIUM 5000 UNITS: 5000 INJECTION, SOLUTION INTRAVENOUS; SUBCUTANEOUS at 14:27

## 2019-07-03 RX ADMIN — ASPIRIN 81 MG 81 MG: 81 TABLET ORAL at 06:19

## 2019-07-03 RX ADMIN — RISPERIDONE 0.25 MG: 0.5 TABLET ORAL at 17:38

## 2019-07-03 RX ADMIN — QUETIAPINE FUMARATE 12.5 MG: 25 TABLET ORAL at 17:37

## 2019-07-03 ASSESSMENT — COGNITIVE AND FUNCTIONAL STATUS - GENERAL
TOILETING: TOTAL
HELP NEEDED FOR BATHING: A LOT
CLIMB 3 TO 5 STEPS WITH RAILING: A LOT
EATING MEALS: A LITTLE
DRESSING REGULAR LOWER BODY CLOTHING: A LOT
MOBILITY SCORE: 10
SUGGESTED CMS G CODE MODIFIER DAILY ACTIVITY: CK
WALKING IN HOSPITAL ROOM: A LOT
DRESSING REGULAR UPPER BODY CLOTHING: A LITTLE
SUGGESTED CMS G CODE MODIFIER MOBILITY: CL
PERSONAL GROOMING: A LITTLE
MOVING FROM LYING ON BACK TO SITTING ON SIDE OF FLAT BED: UNABLE
TURNING FROM BACK TO SIDE WHILE IN FLAT BAD: UNABLE
DAILY ACTIVITIY SCORE: 14
MOVING TO AND FROM BED TO CHAIR: UNABLE
STANDING UP FROM CHAIR USING ARMS: A LITTLE

## 2019-07-03 ASSESSMENT — GAIT ASSESSMENTS
GAIT LEVEL OF ASSIST: MINIMAL ASSIST
ASSISTIVE DEVICE: FRONT WHEEL WALKER
DEVIATION: BRADYKINETIC
DISTANCE (FEET): 2

## 2019-07-03 NOTE — THERAPY
"Occupational Therapy Treatment completed with focus on ADLs, ADL transfers, patient education and cognition.  Functional Status: Pt was seen for Occupational Therapy treatment today, see Therapy Kardex for details.  Treatment included education in breath control with activity and at rest, self pacing techs and energy conservation for pain management. Educated pt in safety awareness techs as well. Confused and not oriented to time, place, year or reason in hospital. Reviewed pacemaker precautions with no evidence of learning/understanding. Psychosocial intervention addressed. Pt is extremely [place] and needed visual cues with verbal cues. Question If hearing aid batteries are working ??  Mod A for supine to sit at EOB with extended time to process and motor plan. Pt demo fair sitting balance. Pt able to wash face, UB with [place] A initially and when she understood what was asked pt able to complete with extended time and with fair thoroughness. Pt demonstrated Mod A for UB dressing changing gown, Max A for LB dressing. Total A for sock donning. Pt demo Mod/Max A for sit to stand and for transfers to BSC. Pt is at risk for fall leaning back with transfers. Mini side steps with HHA  to /from EOB to/from BSC. Total A for full toilet hygiene seated base.  Pt did have a little BM. RN/CNA stated pt is usually incontinent of BM in bed not knowing when she has to go. Mod A for sit to stand and BTB. Pt was left up in bed , call light in reach, bed alarm on and nursing is aware. RN updated. Pt continues tx with pacemaker precautions.  Continue Occupational Therapy services as per plan.    Plan of Care: Will benefit from Occupational Therapy 2 times per week  Discharge Recommendations:  Equipment Will Continue to Assess for Equipment Needs. Post-acute therapy Discharge to inpatient transitional care facility for continued skilled therapy services.    See \"Rehab Therapy-Acute\" Patient Summary Report for complete documentation.   "

## 2019-07-03 NOTE — PROGRESS NOTES
"Hospital Medicine Daily Progress Note    Date of Service  7/3/2019    Chief Complaint  Bradycardia and syncope    Hospital Course   This is a 89-year-old female with PMH significant for CKD 3, hypothyroidism, dementia and [place] who transferred here from outside facility for AV block thought to be Mobitz 2.  She underwent PPM placement on 6/21/2019 by cardiology.  Her post procedure course was complicated by ongoing confusion and attempts to pick at her PPM site incision requiring restraints.      Interval Problem Update  -calm & cooperative this morning. Very [place]. Denies pain  -remains restrained for wound \"picking\". I've ordered low dose Seroquel to start later this afternoon. Dressing CDI. Wound care consult placed.    Consultants/Specialty  Cardiology    Code Status  DNAR/DNI    Disposition  TBD    Review of Systems  Review of Systems   Unable to perform ROS: Dementia        Physical Exam  Temp:  [36.1 °C (97 °F)-37.1 °C (98.7 °F)] 37.1 °C (98.7 °F)  Pulse:  [58-65] 60  Resp:  [18-20] 19  BP: (134-166)/(49-76) 151/49  SpO2:  [93 %-96 %] 95 %    Physical Exam   Constitutional: Vital signs are normal. She appears well-nourished. She is sleeping and uncooperative. She is easily aroused. No distress.   Frail, hard of hearing.    HENT:   Head: Normocephalic.   Right Ear: Decreased hearing is noted.   Left Ear: Decreased hearing is noted.   Nose: Nose normal.   Mouth/Throat: Oropharynx is clear and moist.   Eyes: Pupils are equal, round, and reactive to light. Conjunctivae and EOM are normal. No scleral icterus.   Neck: Normal range of motion. Neck supple. No JVD present.   Cardiovascular: Normal rate and intact distal pulses.  Exam reveals no friction rub.    Murmur heard.   Systolic murmur is present with a grade of 3/6   PPM site with dressing CDI   Pulmonary/Chest: Effort normal. No respiratory distress. She has decreased breath sounds. She has no rhonchi. She has no rales.   Abdominal: Soft. Bowel sounds are normal. " She exhibits no distension and no mass. There is no tenderness. There is no rebound.   Musculoskeletal: Normal range of motion. She exhibits no edema or tenderness.   Lymphadenopathy:     She has no cervical adenopathy.   Neurological: She is easily aroused. She is disoriented.   Skin: Skin is warm. No rash noted.   PPM site with dressing CDI - no drainage noted   Psychiatric: She has a normal mood and affect. Her behavior is normal. Judgment normal. Cognition and memory are normal.       Fluids    Intake/Output Summary (Last 24 hours) at 07/03/19 1329  Last data filed at 07/03/19 1010   Gross per 24 hour   Intake              500 ml   Output                0 ml   Net              500 ml       Laboratory                        Imaging  DX-CHEST-PORTABLE (1 VIEW)   Final Result      1.  Mild bibasilar atelectasis/scarring and trace right effusion.   2.  Unipolar pacer lead projects appropriately.      DX-CHEST-LIMITED (1 VIEW)   Final Result      1.  There is slight improvement in changes of interstitial edema with trace amount of pleural fluid.   2.  Pacemaker lead projects over the cardiac silhouette. There is no visible pneumothorax.      DX-CHEST-PORTABLE (1 VIEW)   Final Result         1.  Pulmonary edema and/or infiltrates are identified, which appear somewhat increased since the prior exam.   2.  Atherosclerosis      DX-CHEST-PORTABLE (1 VIEW)   Final Result      Mild worsening reticular opacification may indicate edema      New left pacer with no evidence of pneumothorax      EC-ECHOCARDIOGRAM COMPLETE W/O CONT   Final Result      DX-CHEST-PORTABLE (1 VIEW)   Final Result      No acute cardiac or pulmonary abnormality is noted.      CL-PERMANENT PACEMAKER INSERTION    (Results Pending)        Assessment/Plan  * Late onset Alzheimer's disease with behavioral disturbance- (present on admission)   Assessment & Plan    -Patient has severe underlying dementia  -she is very [place] likely contributing to her delusions  and behaviors  -Continue restraints to avoid PPM site wound contamination         AV block- (present on admission)   Assessment & Plan    -s/p PPM placement 6/21     S/P placement of cardiac pacemaker   Assessment & Plan    -6/21/2019  -She has been afebrile without leukocytosis  -Area of concern medial to the PPM site on the sternum.  Baseball size area of questionable fluid or blood collection. X-ray showed no acute findings  -I've placed to wound care consult today     Normocytic anemia- (present on admission)   Assessment & Plan    -Iron, vitamin B12, and folate all WNL  -Follow labs     Syncopal episodes- (present on admission)   Assessment & Plan    -due to AV block  -now resolved after PPM placement         Stage 3 chronic kidney disease (HCC)- (present on admission)   Assessment & Plan    -chronic  -Avoid nephrotoxins  -Renally dose medications as indicated  -Follow labs       Hypothyroidism- (present on admission)   Assessment & Plan    -TSH low/free t4 elevated  -Levothyroxine decreased from 175 to 150 on 6/22  -TSH in 4-6 weeks            VTE prophylaxis: Heparin    KEHINDE Farris

## 2019-07-03 NOTE — PROGRESS NOTES
2 RN skin check completed with MARIBELL Blevins.   Devices in place: bilateral soft wrist restraints, nasal cannula.  Skin assessed under devices: Yes.  Confirmed pressure ulcers found on: none.  New potential pressure ulcers noted: none. Wound consult placed: n/a.  The following interventions in place: waffle mattress overlay, q2hr turns, heels floated, checking frequently for incontinence, dressings in place to wounds, non-adhesive foam placed under R wrist restraint.     Heels red/blanching. Bruising to upper chest, incision covered by dressing to L chest. Bruising to RUE/LUE. Skin tear to R upper arm, covered by dressing. Skin tear to L upper arm, covered by dressing. Elbows pink and blanching bilaterally. Outer aspect of L foot red & slowly blanching, mepilex placed. Coccyx red and blanching.

## 2019-07-03 NOTE — PROGRESS NOTES
2 RN skin check complete w/ Babs RN.   Devices in place: Nasal canula, bilateral wrist restraints.  Skin assessed under devices: Yes.  Confirmed pressure ulcers found on: N/A.  New potential pressure ulcers noted: N/A. Wound consult placed: N/A.  The following interventions in place: Q2 turns, waffle mattress overlay, dressings in place to wounds, non-adhesive foam placed under R wrist restraint, heels floated, checking frequently for incontinence.    Bruising to upper chest, incision covered by dressing to L chest. Bruising to RUE & LUE. Skin tear to R upper arm, covered by dressing. Skin tear to L upper arm, covered by dressing. Elbows pink and blanching bilaterally. Heels red and blanching bilaterally. Outer aspect of L foot red & slowly blanching, mepilex placed. Coccyx red and blanching.

## 2019-07-03 NOTE — PROGRESS NOTES
"AAOx1, self. Bilateral wrist restraints in place, order renewed. Patient continues to say \"Please help me\" - however, does not answer this RN when asked what she needs help with. Hearing aids in place to ears bilaterally. Dressing in place to L upper chest over pacemaker site. Q2 turns. Bed alarm on, call light within reach & hourly rounding in place.   "

## 2019-07-03 NOTE — PROGRESS NOTES
Problem: Safety  Goal: Will remain free from injury  Outcome: PROGRESSING AS EXPECTED  Bed alarm on, call light within reach & hourly rounding in place. Bilateral wrist restraints in place & skin checked under restraints. Hourly rounding in place.     Problem: Skin Integrity  Goal: Risk for impaired skin integrity will decrease  Outcome: PROGRESSING SLOWER THAN EXPECTED  Dressing in place to L upper chest. Large amount of bruising/swelling to chest. Q2 turns, waffle mattress overlay in place. Checking frequently for incontinence.

## 2019-07-04 LAB
GRAM STN SPEC: NORMAL
SIGNIFICANT IND 70042: NORMAL
SITE SITE: NORMAL
SOURCE SOURCE: NORMAL

## 2019-07-04 PROCEDURE — A9270 NON-COVERED ITEM OR SERVICE: HCPCS | Performed by: INTERNAL MEDICINE

## 2019-07-04 PROCEDURE — A9270 NON-COVERED ITEM OR SERVICE: HCPCS | Performed by: NURSE PRACTITIONER

## 2019-07-04 PROCEDURE — 700102 HCHG RX REV CODE 250 W/ 637 OVERRIDE(OP): Performed by: INTERNAL MEDICINE

## 2019-07-04 PROCEDURE — A6212 FOAM DRG <=16 SQ IN W/BORDER: HCPCS | Performed by: INTERNAL MEDICINE

## 2019-07-04 PROCEDURE — 307059 PAD,EAR PROTECTOR: Performed by: INTERNAL MEDICINE

## 2019-07-04 PROCEDURE — 770006 HCHG ROOM/CARE - MED/SURG/GYN SEMI*

## 2019-07-04 PROCEDURE — 700111 HCHG RX REV CODE 636 W/ 250 OVERRIDE (IP): Performed by: INTERNAL MEDICINE

## 2019-07-04 PROCEDURE — 700102 HCHG RX REV CODE 250 W/ 637 OVERRIDE(OP): Performed by: NURSE PRACTITIONER

## 2019-07-04 PROCEDURE — 87205 SMEAR GRAM STAIN: CPT

## 2019-07-04 PROCEDURE — 92526 ORAL FUNCTION THERAPY: CPT

## 2019-07-04 PROCEDURE — 99233 SBSQ HOSP IP/OBS HIGH 50: CPT | Performed by: INTERNAL MEDICINE

## 2019-07-04 PROCEDURE — 87070 CULTURE OTHR SPECIMN AEROBIC: CPT

## 2019-07-04 RX ORDER — AMOXICILLIN AND CLAVULANATE POTASSIUM 875; 125 MG/1; MG/1
1 TABLET, FILM COATED ORAL EVERY 12 HOURS
Status: DISCONTINUED | OUTPATIENT
Start: 2019-07-04 | End: 2019-07-09

## 2019-07-04 RX ORDER — CEPHALEXIN 500 MG/1
500 CAPSULE ORAL EVERY 8 HOURS
Status: DISCONTINUED | OUTPATIENT
Start: 2019-07-04 | End: 2019-07-04

## 2019-07-04 RX ORDER — DOXYCYCLINE 100 MG/1
100 TABLET ORAL EVERY 12 HOURS
Status: DISCONTINUED | OUTPATIENT
Start: 2019-07-04 | End: 2019-07-09

## 2019-07-04 RX ADMIN — QUETIAPINE FUMARATE 12.5 MG: 25 TABLET ORAL at 18:11

## 2019-07-04 RX ADMIN — ASPIRIN 81 MG 81 MG: 81 TABLET ORAL at 06:29

## 2019-07-04 RX ADMIN — LEVOTHYROXINE SODIUM 150 MCG: 75 TABLET ORAL at 06:29

## 2019-07-04 RX ADMIN — HEPARIN SODIUM 5000 UNITS: 5000 INJECTION, SOLUTION INTRAVENOUS; SUBCUTANEOUS at 06:29

## 2019-07-04 RX ADMIN — DOXYCYCLINE 100 MG: 100 TABLET, FILM COATED ORAL at 13:50

## 2019-07-04 RX ADMIN — CITALOPRAM HYDROBROMIDE 20 MG: 20 TABLET ORAL at 06:29

## 2019-07-04 RX ADMIN — SENNOSIDES AND DOCUSATE SODIUM 2 TABLET: 8.6; 5 TABLET ORAL at 06:30

## 2019-07-04 RX ADMIN — HEPARIN SODIUM 5000 UNITS: 5000 INJECTION, SOLUTION INTRAVENOUS; SUBCUTANEOUS at 13:50

## 2019-07-04 RX ADMIN — RISPERIDONE 0.25 MG: 0.5 TABLET ORAL at 18:12

## 2019-07-04 RX ADMIN — AMOXICILLIN AND CLAVULANATE POTASSIUM 1 TABLET: 875; 125 TABLET, FILM COATED ORAL at 13:50

## 2019-07-04 ASSESSMENT — PAIN SCALES - PAIN ASSESSMENT IN ADVANCED DEMENTIA (PAINAD)
TOTALSCORE: 0
CONSOLABILITY: NO NEED TO CONSOLE
FACIALEXPRESSION: SMILING OR INEXPRESSIVE
BREATHING: NORMAL
BODYLANGUAGE: RELAXED

## 2019-07-04 NOTE — FACE TO FACE
Face to Face Supporting Documentation - Home Health    The encounter with this patient was in whole or in part the primary reason for home health admission.    Date of encounter:   Patient:                    MRN:                       YOB: 2019  Charmaine Cordova  [id number]  4/26/1925     Home health to see patient for:  Skilled Nursing care for assessment, interventions & education, Physical Therapy evaluation and treatment and Occupational therapy evaluation and treatment    Skilled need for:  Surgical Aftercare PPM placement and Medication Management Daily scheduled Medications    Skilled nursing interventions to include:  Comment: Assessment, Medication Management and PPM site surveillence    Homebound status evidenced by:  Needs the assistance of another person in order to leave the home. Leaving home requires a considerable and taxing effort. There is a normal inability to leave the home.    Community Physician to provide follow up care: No primary care provider on file.     Optional Interventions? No      I certify the face to face encounter for this home health care referral meets the CMS requirements and the encounter/clinical assessment with the patient was, in whole, or in part, for the medical condition(s) listed above, which is the primary reason for home health care. Based on my clinical findings: the service(s) are medically necessary, support the need for home health care, and the homebound criteria are met.  I certify that this patient has had a face to face encounter by myself.  DU Farris. - NPI: [id number]

## 2019-07-04 NOTE — DISCHARGE PLANNING
Anticipated Discharge Disposition: [facility], [facility], [place]     Action: Discussed patient's needs during IDT rounds.   Patient is in soft restraints, picking at her pace maker.  Patient placed on a low dose of Seroquel.     Barriers to Discharge: medical clearance    Plan: continue to monitor for SW needs

## 2019-07-04 NOTE — THERAPY
"Speech Language Therapy dysphagia treatment completed.     Functional Status:  Pt was seen for dysphagia tx at lunch, with a dysphagia II/thin liquid meal tray.  Pt was yelling, \"help\" upon entering the room, however calmed easily and was agreeable to eating breakfast.  Pt sat upright in bed, attempted to feed herself with great difficulty due to BUE tremors.  This SLP took over feeding, and pt consumed soft solids and thin liquids without s/sx of aspiration.  Pt continues to have minimally prolonged, but functional mastication, and no oral residue was noted.  With minimal attempts to vocalize, voice was clear.  Of note, pt had coughing before and after PO intake, with wet upper sounding airway, however this was not related to PO intake.  Pt consumed approx 50% of her meal.  Recommend to continue a dysphagia II diet with thin liquids, with direct feeding assistance.  SLP continues to follow.      Recommendations: continue dysphagia II with thins, with direct feeding assistance    Plan of Care: Will benefit from Speech Therapy 3 times per week    Post-Acute Therapy: Recommend inpatient transitional care services for continued speech therapy services.      See \"Rehab Therapy-Acute\" Patient Summary Report for complete documentation.     "

## 2019-07-04 NOTE — THERAPY
"Pt agreeable to PT session, requesting to get OOB upon PT arrival. Pt demonstrated acute decline in functional mobility, balance, gait, and overall activity tolerance since inital PT evaluation on 6/24/19. Suspect decreased mobility with nursing while in acute setting. Pt declined further ambulation beyond side stepping at EOB due to fatigue. Updated PT POC and frequency to reflect decline in function.    Physical Therapy Treatment completed.   Bed Mobility:  Supine to Sit: Moderate Assist (to trunk)  Transfers: Sit to Stand: Minimal Assist  Gait: Level Of Assist: Minimal Assist with Front-Wheel Walker       Plan of Care: Will benefit from Physical Therapy 3 times per week  Discharge Recommendations: Equipment: Will Continue to Assess for Equipment Needs.   Post-acute therapy:  May benefit from post acute placement depending on level of care/assist provided at memory care facility. If memory care facility can meet pt's current mobility needs, recommend return with HH therapy follow up.         See \"Rehab Therapy-Acute\" Patient Summary Report for complete documentation.       "

## 2019-07-04 NOTE — PROGRESS NOTES
2 RN skin check completed with Myke Stringer RN.   Devices in place: bilateral soft wrist restraints, nasal cannula.  Skin assessed under devices: Yes.  Confirmed pressure ulcers found on: none.  New potential pressure ulcers noted: none. Wound consult placed: n/a.  The following interventions in place: waffle mattress overlay, q2hr turns, heels floated, checking frequently for incontinence, dressings in place to wounds, non-adhesive foam placed under R wrist restraint.     Heels red/blanching. Bruising to upper chest, incision covered by dressing to L chest. Bruising to RUE/LUE. Skin tear to R upper arm, covered by dressing. Skin tear to L upper arm, covered by dressing. Elbows pink and blanching bilaterally. Outer aspect of L foot red & slowly blanching, mepilex placed. Coccyx red and blanching.

## 2019-07-04 NOTE — PROGRESS NOTES
Hospital Medicine Daily Progress Note    Date of Service  7/4/2019    Chief Complaint  Bradycardia and syncope    Hospital Course   This is a 89-year-old female with PMH significant for CKD 3, hypothyroidism, dementia and [place] who transferred here from outside facility for AV block thought to be Mobitz 2.  She underwent PPM placement on 6/21/2019 by cardiology.  Her post procedure course was complicated by ongoing confusion and attempts to pick at her PPM site incision requiring restraints.     Interval Problem Update  -site appears more concerning for infection - see picture loaded to media section today.Starting 5 day course of ABX today - Augmentin & Doxy  -wound culture ordered  -wound consult pending    Consultants/Specialty  -Cardiology - signed off    Code Status  DNAR/DNI    Disposition  Her currently Senior Living Facility is not comfortable taking her back until her PPM site has healed. PT/OT recommend HH - referral placed today.    Review of Systems  Review of Systems   Unable to perform ROS: Dementia        Physical Exam  Temp:  [36.3 °C (97.4 °F)-36.8 °C (98.2 °F)] 36.5 °C (97.7 °F)  Pulse:  [60] 60  Resp:  [17-20] 17  BP: (147-155)/(48-51) 155/51  SpO2:  [94 %-96 %] 95 %    Physical Exam   Constitutional: Vital signs are normal. She appears well-nourished. She is sleeping and uncooperative. She is easily aroused. No distress.   Frail, hard of hearing.    HENT:   Head: Normocephalic.   Right Ear: Decreased hearing is noted.   Left Ear: Decreased hearing is noted.   Nose: Nose normal.   Mouth/Throat: Oropharynx is clear and moist.   VERY [place]   Eyes: Pupils are equal, round, and reactive to light. Conjunctivae and EOM are normal. No scleral icterus.   Neck: Normal range of motion. Neck supple. No JVD present.   Cardiovascular: Normal rate and intact distal pulses.  Exam reveals no friction rub.    Murmur heard.   Systolic murmur is present with a grade of 3/6   PPM site with dressing CDI   Pulmonary/Chest:  Effort normal. No respiratory distress. She has decreased breath sounds. She has no rhonchi. She has no rales.   Abdominal: Soft. Bowel sounds are normal. She exhibits no distension and no mass. There is no tenderness. There is no rebound.   Musculoskeletal: Normal range of motion. She exhibits no edema or tenderness.   Lymphadenopathy:     She has no cervical adenopathy.   Neurological: She is easily aroused. She is disoriented.   Skin: Skin is warm. No rash noted.   PPM site cleansed with wound .  Area showing concern for infection.   Psychiatric: She has a normal mood and affect. Her behavior is normal. Judgment normal. Cognition and memory are impaired. She exhibits abnormal recent memory and abnormal remote memory.       Fluids    Intake/Output Summary (Last 24 hours) at 07/04/19 1152  Last data filed at 07/04/19 0900   Gross per 24 hour   Intake              520 ml   Output                0 ml   Net              520 ml       Laboratory                        Imaging  DX-CHEST-PORTABLE (1 VIEW)   Final Result      1.  Mild bibasilar atelectasis/scarring and trace right effusion.   2.  Unipolar pacer lead projects appropriately.      DX-CHEST-LIMITED (1 VIEW)   Final Result      1.  There is slight improvement in changes of interstitial edema with trace amount of pleural fluid.   2.  Pacemaker lead projects over the cardiac silhouette. There is no visible pneumothorax.      DX-CHEST-PORTABLE (1 VIEW)   Final Result         1.  Pulmonary edema and/or infiltrates are identified, which appear somewhat increased since the prior exam.   2.  Atherosclerosis      DX-CHEST-PORTABLE (1 VIEW)   Final Result      Mild worsening reticular opacification may indicate edema      New left pacer with no evidence of pneumothorax      EC-ECHOCARDIOGRAM COMPLETE W/O CONT   Final Result      DX-CHEST-PORTABLE (1 VIEW)   Final Result      No acute cardiac or pulmonary abnormality is noted.      CL-PERMANENT PACEMAKER INSERTION     (Results Pending)        Assessment/Plan  * Late onset Alzheimer's disease with behavioral disturbance- (present on admission)   Assessment & Plan    -Patient has severe underlying dementia  -she is very [place] likely contributing to her delusions and behaviors  -Continue restraints to avoid PPM site wound contamination         AV block- (present on admission)   Assessment & Plan    -s/p PPM placement 6/21     S/P placement of cardiac pacemaker   Assessment & Plan    -6/21/2019  -She has been afebrile without leukocytosis  -Area of concern medial to the PPM site on the sternum.  Baseball size area of questionable fluid or blood collection. X-ray showed no acute findings  -wound consult pending  -site appears more concerning for infection - see picture loaded to media section today. I've ordered 3 day course of Keflex. Reassess if longer ABX course is warranted.  -wound culture ordered     Normocytic anemia- (present on admission)   Assessment & Plan    -Iron, vitamin B12, and folate all WNL  -Follow labs     Syncopal episodes- (present on admission)   Assessment & Plan    -due to AV block  -now resolved after PPM placement         Stage 3 chronic kidney disease (HCC)- (present on admission)   Assessment & Plan    -chronic  -Avoid nephrotoxins  -Renally dose medications as indicated  -Follow labs       Hypothyroidism- (present on admission)   Assessment & Plan    -TSH low/free t4 elevated  -Levothyroxine decreased from 175 to 150 on 6/22  -TSH in 4-6 weeks            VTE prophylaxis: SCD    KEHINDE Farris

## 2019-07-04 NOTE — PROGRESS NOTES
Pt is confused, no s/sx pain/discomfort noted. Pt continues to be in bilateral soft wrist restraints with q2h monitoring. Pt is fatigued, resting quietly in bed.  Call light within reach, personal belongings available, bed in lowest position, treaded socks on, and bed alarm on. Hourly rounding in place.

## 2019-07-04 NOTE — PROGRESS NOTES
AAOx1, self. Sleeping heavily this AM. Bilateral wrist restraints in place, order renewed. Dressing to L upper chest to be changed today. Hearing aids in place to ears bilaterally. Q2 turns. Bed alarm on, call light within reach & hourly rounding in place.

## 2019-07-05 PROBLEM — L03.90 CELLULITIS: Status: ACTIVE | Noted: 2019-07-05

## 2019-07-05 PROBLEM — R53.1 GENERALIZED WEAKNESS: Status: ACTIVE | Noted: 2019-07-05

## 2019-07-05 LAB
ANION GAP SERPL CALC-SCNC: 5 MMOL/L (ref 0–11.9)
BASOPHILS # BLD AUTO: 1.3 % (ref 0–1.8)
BASOPHILS # BLD: 0.1 K/UL (ref 0–0.12)
BUN SERPL-MCNC: 15 MG/DL (ref 8–22)
CALCIUM SERPL-MCNC: 8.8 MG/DL (ref 8.5–10.5)
CHLORIDE SERPL-SCNC: 104 MMOL/L (ref 96–112)
CO2 SERPL-SCNC: 27 MMOL/L (ref 20–33)
CREAT SERPL-MCNC: 0.79 MG/DL (ref 0.5–1.4)
EOSINOPHIL # BLD AUTO: 0.11 K/UL (ref 0–0.51)
EOSINOPHIL NFR BLD: 1.4 % (ref 0–6.9)
ERYTHROCYTE [DISTWIDTH] IN BLOOD BY AUTOMATED COUNT: 48 FL (ref 35.9–50)
GLUCOSE SERPL-MCNC: 85 MG/DL (ref 65–99)
HCT VFR BLD AUTO: 39.6 % (ref 37–47)
HGB BLD-MCNC: 12.3 G/DL (ref 12–16)
IMM GRANULOCYTES # BLD AUTO: 0.14 K/UL (ref 0–0.11)
IMM GRANULOCYTES NFR BLD AUTO: 1.8 % (ref 0–0.9)
LYMPHOCYTES # BLD AUTO: 1.42 K/UL (ref 1–4.8)
LYMPHOCYTES NFR BLD: 18.7 % (ref 22–41)
MCH RBC QN AUTO: 29.3 PG (ref 27–33)
MCHC RBC AUTO-ENTMCNC: 31.1 G/DL (ref 33.6–35)
MCV RBC AUTO: 94.3 FL (ref 81.4–97.8)
MONOCYTES # BLD AUTO: 0.99 K/UL (ref 0–0.85)
MONOCYTES NFR BLD AUTO: 13 % (ref 0–13.4)
NEUTROPHILS # BLD AUTO: 4.84 K/UL (ref 2–7.15)
NEUTROPHILS NFR BLD: 63.8 % (ref 44–72)
NRBC # BLD AUTO: 0 K/UL
NRBC BLD-RTO: 0 /100 WBC
PLATELET # BLD AUTO: 221 K/UL (ref 164–446)
PMV BLD AUTO: 9.8 FL (ref 9–12.9)
POTASSIUM SERPL-SCNC: 4 MMOL/L (ref 3.6–5.5)
RBC # BLD AUTO: 4.2 M/UL (ref 4.2–5.4)
SODIUM SERPL-SCNC: 136 MMOL/L (ref 135–145)
WBC # BLD AUTO: 7.6 K/UL (ref 4.8–10.8)

## 2019-07-05 PROCEDURE — 700102 HCHG RX REV CODE 250 W/ 637 OVERRIDE(OP): Performed by: INTERNAL MEDICINE

## 2019-07-05 PROCEDURE — 97530 THERAPEUTIC ACTIVITIES: CPT

## 2019-07-05 PROCEDURE — 99232 SBSQ HOSP IP/OBS MODERATE 35: CPT | Performed by: INTERNAL MEDICINE

## 2019-07-05 PROCEDURE — 36415 COLL VENOUS BLD VENIPUNCTURE: CPT

## 2019-07-05 PROCEDURE — 80048 BASIC METABOLIC PNL TOTAL CA: CPT

## 2019-07-05 PROCEDURE — 770006 HCHG ROOM/CARE - MED/SURG/GYN SEMI*

## 2019-07-05 PROCEDURE — A9270 NON-COVERED ITEM OR SERVICE: HCPCS | Performed by: INTERNAL MEDICINE

## 2019-07-05 PROCEDURE — 700102 HCHG RX REV CODE 250 W/ 637 OVERRIDE(OP): Performed by: NURSE PRACTITIONER

## 2019-07-05 PROCEDURE — A9270 NON-COVERED ITEM OR SERVICE: HCPCS | Performed by: NURSE PRACTITIONER

## 2019-07-05 PROCEDURE — 700111 HCHG RX REV CODE 636 W/ 250 OVERRIDE (IP): Performed by: INTERNAL MEDICINE

## 2019-07-05 PROCEDURE — 85025 COMPLETE CBC W/AUTO DIFF WBC: CPT

## 2019-07-05 RX ADMIN — RISPERIDONE 0.25 MG: 0.5 TABLET ORAL at 17:32

## 2019-07-05 RX ADMIN — TRAZODONE HYDROCHLORIDE 50 MG: 50 TABLET ORAL at 19:43

## 2019-07-05 RX ADMIN — HEPARIN SODIUM 5000 UNITS: 5000 INJECTION, SOLUTION INTRAVENOUS; SUBCUTANEOUS at 05:58

## 2019-07-05 RX ADMIN — AMOXICILLIN AND CLAVULANATE POTASSIUM 1 TABLET: 875; 125 TABLET, FILM COATED ORAL at 17:32

## 2019-07-05 RX ADMIN — AMOXICILLIN AND CLAVULANATE POTASSIUM 1 TABLET: 875; 125 TABLET, FILM COATED ORAL at 06:00

## 2019-07-05 RX ADMIN — SENNOSIDES AND DOCUSATE SODIUM 2 TABLET: 8.6; 5 TABLET ORAL at 17:32

## 2019-07-05 RX ADMIN — QUETIAPINE FUMARATE 12.5 MG: 25 TABLET ORAL at 16:17

## 2019-07-05 RX ADMIN — ASPIRIN 81 MG 81 MG: 81 TABLET ORAL at 05:59

## 2019-07-05 RX ADMIN — DOXYCYCLINE 100 MG: 100 TABLET, FILM COATED ORAL at 17:32

## 2019-07-05 RX ADMIN — LEVOTHYROXINE SODIUM 150 MCG: 75 TABLET ORAL at 05:59

## 2019-07-05 RX ADMIN — HEPARIN SODIUM 5000 UNITS: 5000 INJECTION, SOLUTION INTRAVENOUS; SUBCUTANEOUS at 13:49

## 2019-07-05 RX ADMIN — CITALOPRAM HYDROBROMIDE 20 MG: 20 TABLET ORAL at 05:59

## 2019-07-05 RX ADMIN — DOXYCYCLINE 100 MG: 100 TABLET, FILM COATED ORAL at 05:59

## 2019-07-05 ASSESSMENT — COGNITIVE AND FUNCTIONAL STATUS - GENERAL
CLIMB 3 TO 5 STEPS WITH RAILING: A LOT
WALKING IN HOSPITAL ROOM: A LOT
SUGGESTED CMS G CODE MODIFIER MOBILITY: CL
TURNING FROM BACK TO SIDE WHILE IN FLAT BAD: A LOT
MOVING TO AND FROM BED TO CHAIR: A LOT
MOBILITY SCORE: 12
MOVING FROM LYING ON BACK TO SITTING ON SIDE OF FLAT BED: A LOT
STANDING UP FROM CHAIR USING ARMS: A LOT

## 2019-07-05 ASSESSMENT — GAIT ASSESSMENTS: GAIT LEVEL OF ASSIST: REFUSED

## 2019-07-05 NOTE — THERAPY
"Physical Therapy Treatment completed.   Bed Mobility:  Supine to Sit: Moderate Assist  Transfers: Sit to Stand: Minimal Assist  Gait: Level Of Assist: Refused with Front-Wheel Walker       Plan of Care: Will benefit from Physical Therapy 3 times per week  Discharge Recommendations: Equipment: Will Continue to Assess for Equipment Needs. Post-acute therapy potentially recommended if memory care unable to meet patients needs.     Patient in room requesting help upon PT arrival for PT session. Patient unable to identify what she needs help with when asked stating \"I don't know, scratch my back\". No non-verbal s/s of pain noted throughout session. Patient demonstrates quick fatigue with activity tolerating standing x2 for 30s before requesting to sit back down. Patient cont to require mod A for supine<>sit and min A for sit to stand. Standing balance poor with posterior lean against bed and FWW. Patient would benefit from post-acute placement or support staff at memory care facility to improve mobility upon acute care DC.    See \"Rehab Therapy-Acute\" Patient Summary Report for complete documentation.       "

## 2019-07-05 NOTE — PROGRESS NOTES
2 RN skin check complete w/ Jessica RN.   Devices in place: Nasal canula.  Skin assessed under devices: Yes.  Confirmed pressure ulcers found on: N/A.  New potential pressure ulcers noted: N/A. Wound consult placed: N/A.  The following interventions in place: Q2 turns, waffle mattress overlay, dressings in place to wounds, heels floated, checking frequently for incontinence.     Bruising to upper chest, incision covered by dressing to L chest. Bruising to RUE & LUE. Skin tear to R upper arm, covered by dressing. Skin tear to L upper arm x2, covered by dressing. Elbows pink and blanching bilaterally. Ears red and blanching bilaterally. Heels red and blanching bilaterally. Outer aspect of L foot red & slowly blanching, mepilex placed. Coccyx pink and blanching.

## 2019-07-05 NOTE — ASSESSMENT & PLAN NOTE
Currently resolved. Afebrile, no leukocytosis  -PPM site left chest incision still not completely healed still require coverage to prevent infection  -completed 5 days of empiric ABX coverage

## 2019-07-05 NOTE — PROGRESS NOTES
Pt is confused and calling out to have restraints removed and to get up. When pt positioned to EOB she immediately wanted to get back to bed. Pt has removed dressing to upper left chest x2 today when restraints were loosened. Continued education regarding the importance of leaving dressing and wound alone required, pt does not exhibit understanding and removes dressing/scrating wound when able to reach the site. Bilat wrist restraints renewed, adjusted and in place so that pacemaker site can heal without infection.

## 2019-07-05 NOTE — PROGRESS NOTES
2 RN skin check complete with Ingrid  Devices in place; bilateral soft wrist restraints.  Skin assessed under devices Yes, pinkish red and blanching.  Confirmed pressure ulcers found on  N/A.  New potential pressure ulcers noted on N/A    Noted pinkish blanching, bilateral back of the ears. Bruise on the left shoulder. Pinkish to red blanching spot on the back.  Skin tear on bilateral arms. Skin tag and bruise in abdominal area. Bruise spot in left lateral chest. Scabs seen in lower extremities.    The following interventions in place; assisted to turn to sides every 2 hours. Incontinence care done. On pressure redistribution mattress.

## 2019-07-05 NOTE — CARE PLAN
Problem: Safety  Goal: Will remain free from injury  Outcome: PROGRESSING AS EXPECTED  Assess bilat wrist restraints min Q2 hrs, assess for any pressure points and readjust as necessary.   Room next to nurses station and hourly rounding in place.     Problem: Discharge Barriers/Planning  Goal: Patient's continuum of care needs will be met  Outcome: PROGRESSING AS EXPECTED  Bilat wrist restraints with active order. Assess for discontinuation when pacer site healed enough.  Change dressing per order and when dislodged.  1:1 assistance with meals

## 2019-07-05 NOTE — PROGRESS NOTES
Hospital Medicine Daily Progress Note    Date of Service  7/5/2019    Chief Complaint  Bradycardia and syncope    Hospital Course   This is a 89-year-old female with PMH significant for CKD 3, hypothyroidism, dementia and [place] who transferred here from outside facility for AV block thought to be Mobitz 2.  She underwent PPM placement on 6/21/2019 by cardiology.  Her post procedure course was complicated by ongoing confusion and attempts to pick at her PPM site incision requiring restraints.     Interval Problem Update  7/5:  Scab noted to left chest wall.  No drainage to area.  Afebrile.  Disoriented.  Requiring restraints.      Consultants/Specialty  -Cardiology - signed off    Code Status  DNAR/DNI    Disposition  Her currently Senior Living Facility is not comfortable taking her back until her PPM site has healed. PT/OT recommend HH - referral placed today.    Review of Systems  Review of Systems   Unable to perform ROS: Dementia        Physical Exam  Temp:  [36.3 °C (97.3 °F)-36.7 °C (98 °F)] 36.7 °C (98 °F)  Pulse:  [60-63] 60  Resp:  [17-18] 18  BP: (119-157)/(42-57) 157/52  SpO2:  [91 %-97 %] 91 %    Physical Exam   Constitutional: Vital signs are normal. She appears well-nourished. She is sleeping and uncooperative. She is easily aroused. No distress.   Frail, hard of hearing.    HENT:   Head: Normocephalic and atraumatic.   Right Ear: Decreased hearing is noted.   Left Ear: Decreased hearing is noted.   Mouth/Throat: No oropharyngeal exudate.   VERY [place]   Eyes: Conjunctivae are normal. Right eye exhibits no discharge. Left eye exhibits no discharge.   Neck: Normal range of motion. Neck supple. No tracheal deviation present.   Cardiovascular: Normal rate and intact distal pulses.    Murmur heard.   Systolic murmur is present with a grade of 3/6   PPM site with dressing CDI   Pulmonary/Chest: Effort normal. No stridor. No respiratory distress. She has no wheezes. She has no rhonchi.   Abdominal: Soft. Bowel  sounds are normal. She exhibits no distension. There is no tenderness. There is no rebound.   Musculoskeletal: Normal range of motion. She exhibits no edema.   Neurological: She is alert and easily aroused. She is disoriented.   Skin: Skin is warm. No rash noted. She is not diaphoretic.   PPM site surrounding wound  Area showing concern for infection.   Psychiatric: Her behavior is normal. Her affect is labile. Cognition and memory are impaired. She expresses impulsivity and inappropriate judgment. She exhibits abnormal recent memory and abnormal remote memory.       Fluids    Intake/Output Summary (Last 24 hours) at 07/05/19 1417  Last data filed at 07/05/19 0600   Gross per 24 hour   Intake              360 ml   Output                0 ml   Net              360 ml       Laboratory  Recent Labs      07/05/19   0341   WBC  7.6   RBC  4.20   HEMOGLOBIN  12.3   HEMATOCRIT  39.6   MCV  94.3   MCH  29.3   MCHC  31.1*   RDW  48.0   PLATELETCT  221   MPV  9.8     Recent Labs      07/05/19   0341   SODIUM  136   POTASSIUM  4.0   CHLORIDE  104   CO2  27   GLUCOSE  85   BUN  15   CREATININE  0.79   CALCIUM  8.8                   Imaging  DX-CHEST-PORTABLE (1 VIEW)   Final Result      1.  Mild bibasilar atelectasis/scarring and trace right effusion.   2.  Unipolar pacer lead projects appropriately.      DX-CHEST-LIMITED (1 VIEW)   Final Result      1.  There is slight improvement in changes of interstitial edema with trace amount of pleural fluid.   2.  Pacemaker lead projects over the cardiac silhouette. There is no visible pneumothorax.      DX-CHEST-PORTABLE (1 VIEW)   Final Result         1.  Pulmonary edema and/or infiltrates are identified, which appear somewhat increased since the prior exam.   2.  Atherosclerosis      DX-CHEST-PORTABLE (1 VIEW)   Final Result      Mild worsening reticular opacification may indicate edema      New left pacer with no evidence of pneumothorax      EC-ECHOCARDIOGRAM COMPLETE W/O CONT    Final Result      DX-CHEST-PORTABLE (1 VIEW)   Final Result      No acute cardiac or pulmonary abnormality is noted.      CL-PERMANENT PACEMAKER INSERTION    (Results Pending)        Assessment/Plan  * Late onset Alzheimer's disease with behavioral disturbance- (present on admission)   Assessment & Plan    -Patient has severe underlying dementia  -she is very [place] likely contributing to her delusions and behaviors  -Continue risperdal, seroquel, and celexa.  -Continue restraints to avoid PPM site wound contamination  -PRN haldol.         Cellulitis   Assessment & Plan    Left chest wall around pacemaker site.  Secondary to patient picking at surgical wound  Continue augmentin and doxycycline.      AV block- (present on admission)   Assessment & Plan    -s/p PPM placement 6/21  -HR stable.      Generalized weakness   Assessment & Plan    PT/OT   May need therapy with return to memory care facility.     S/P placement of cardiac pacemaker   Assessment & Plan    -6/21/2019  -She has been afebrile without leukocytosis  -Area of concern medial to the PPM site on the sternum.  Baseball size area of questionable fluid or blood collection. X-ray showed no acute findings  -wound consult pending  -site appears more concerning for infection - see picture loaded to media section today. Started 5 day course of ABX  -wound culture negative.      Normocytic anemia- (present on admission)   Assessment & Plan    -Iron, vitamin B12, and folate all WNL  -Follow labs     Syncopal episodes- (present on admission)   Assessment & Plan    -due to AV block  -now resolved after PPM placement         Stage 3 chronic kidney disease (HCC)- (present on admission)   Assessment & Plan    -chronic  -Avoid nephrotoxins  -Renally dose medications as indicated  -Follow labs       Hypothyroidism- (present on admission)   Assessment & Plan    -TSH low/free t4 elevated  -Levothyroxine decreased from 175 to 150 on 6/22  -TSH in 4-6 weeks            VTE  prophylaxis: SCD    KEHINDE Manzanares

## 2019-07-05 NOTE — PROGRESS NOTES
Spoke to Yolanda Zarco about re initiating restraints as patient is back in bed and has pulled off pace maker dressing 3 times. Pt is not directable at this time. Per order initiate L/R wrist restraints

## 2019-07-05 NOTE — DIETARY
"Nutrition Services Update: following for adequate nutritional     Day 15 of admit. Regular/Dysphagia 2/Thin liquid diet. ADL documentation shows intake has been variable, <25-50% meal intake.    Attempted to visit pt at bedside, however pt extremely hard of hearing. Pt did nod head \"yes\" when asked if she liked her meals and if she was eating well. Attempted to ask if pt has a hx of drinking nutrition supplements, however pt was unable to hear/understand. Breakfast tray at bedside at time of RD visit and was 25-50% consumed. At this time will start Boost Plus 1-2 x day to optimize intake.     Plan/Recommend:   1. Start Boost Plus 1-2 x day to further optimize intake   2. Encourage PO intake of meals and supplements  3. Please record intake as % meals consumed in ADLs  4. RD will continue to monitor    RD following     "

## 2019-07-05 NOTE — DISCHARGE PLANNING
Today in rounds the doctor said that the patient can't discharge back to [facility] in [place] until her surgical wound (from pacemaker placement on 6/21) is completely healed.  There is an order for home health but she may not need it by the time she discharges.  SW or CM will need to check with the doctor when the time comes.

## 2019-07-06 ENCOUNTER — APPOINTMENT (OUTPATIENT)
Dept: RADIOLOGY | Facility: MEDICAL CENTER | Age: 84
DRG: 243 | End: 2019-07-06
Attending: NURSE PRACTITIONER
Payer: MEDICARE

## 2019-07-06 LAB
BACTERIA WND AEROBE CULT: NORMAL
GRAM STN SPEC: NORMAL
PROCALCITONIN SERPL-MCNC: <0.05 NG/ML
SIGNIFICANT IND 70042: NORMAL
SITE SITE: NORMAL
SOURCE SOURCE: NORMAL

## 2019-07-06 PROCEDURE — 700102 HCHG RX REV CODE 250 W/ 637 OVERRIDE(OP): Performed by: INTERNAL MEDICINE

## 2019-07-06 PROCEDURE — A9270 NON-COVERED ITEM OR SERVICE: HCPCS | Performed by: NURSE PRACTITIONER

## 2019-07-06 PROCEDURE — 99232 SBSQ HOSP IP/OBS MODERATE 35: CPT | Performed by: INTERNAL MEDICINE

## 2019-07-06 PROCEDURE — 84145 PROCALCITONIN (PCT): CPT

## 2019-07-06 PROCEDURE — A9270 NON-COVERED ITEM OR SERVICE: HCPCS | Performed by: INTERNAL MEDICINE

## 2019-07-06 PROCEDURE — 700102 HCHG RX REV CODE 250 W/ 637 OVERRIDE(OP): Performed by: NURSE PRACTITIONER

## 2019-07-06 PROCEDURE — 71045 X-RAY EXAM CHEST 1 VIEW: CPT

## 2019-07-06 PROCEDURE — 770006 HCHG ROOM/CARE - MED/SURG/GYN SEMI*

## 2019-07-06 PROCEDURE — 700111 HCHG RX REV CODE 636 W/ 250 OVERRIDE (IP): Performed by: INTERNAL MEDICINE

## 2019-07-06 PROCEDURE — 36415 COLL VENOUS BLD VENIPUNCTURE: CPT

## 2019-07-06 RX ADMIN — AMOXICILLIN AND CLAVULANATE POTASSIUM 1 TABLET: 875; 125 TABLET, FILM COATED ORAL at 04:42

## 2019-07-06 RX ADMIN — RISPERIDONE 0.25 MG: 0.5 TABLET ORAL at 17:43

## 2019-07-06 RX ADMIN — HEPARIN SODIUM 5000 UNITS: 5000 INJECTION, SOLUTION INTRAVENOUS; SUBCUTANEOUS at 14:16

## 2019-07-06 RX ADMIN — DOXYCYCLINE 100 MG: 100 TABLET, FILM COATED ORAL at 04:38

## 2019-07-06 RX ADMIN — HALOPERIDOL LACTATE 1 MG: 5 INJECTION, SOLUTION INTRAMUSCULAR at 21:33

## 2019-07-06 RX ADMIN — AMOXICILLIN AND CLAVULANATE POTASSIUM 1 TABLET: 875; 125 TABLET, FILM COATED ORAL at 17:43

## 2019-07-06 RX ADMIN — DOXYCYCLINE 100 MG: 100 TABLET, FILM COATED ORAL at 17:43

## 2019-07-06 RX ADMIN — ASPIRIN 81 MG 81 MG: 81 TABLET ORAL at 04:38

## 2019-07-06 RX ADMIN — LEVOTHYROXINE SODIUM 150 MCG: 75 TABLET ORAL at 04:39

## 2019-07-06 RX ADMIN — CITALOPRAM HYDROBROMIDE 20 MG: 20 TABLET ORAL at 04:38

## 2019-07-06 RX ADMIN — TRAZODONE HYDROCHLORIDE 50 MG: 50 TABLET ORAL at 19:52

## 2019-07-06 RX ADMIN — SENNOSIDES AND DOCUSATE SODIUM 2 TABLET: 8.6; 5 TABLET ORAL at 04:38

## 2019-07-06 RX ADMIN — QUETIAPINE FUMARATE 12.5 MG: 25 TABLET ORAL at 15:21

## 2019-07-06 RX ADMIN — HEPARIN SODIUM 5000 UNITS: 5000 INJECTION, SOLUTION INTRAVENOUS; SUBCUTANEOUS at 04:47

## 2019-07-06 ASSESSMENT — PAIN SCALES - PAIN ASSESSMENT IN ADVANCED DEMENTIA (PAINAD)
BODYLANGUAGE: RELAXED
TOTALSCORE: 2
BREATHING: NORMAL
FACIALEXPRESSION: SAD, FRIGHTENED, FROWN
NEGVOCALIZATION: OCCASIONAL MOAN/GROAN, LOW SPEECH, NEGATIVE/DISAPPROVING QUALITY
CONSOLABILITY: NO NEED TO CONSOLE

## 2019-07-06 NOTE — PROGRESS NOTES
Pt alert with VSS today. Intermittent confusion and calling out throughout day, requiring ongoing reassurance and orientation. Continues to require bilat wrist restraints as she picks at wound and pulls dressing off.   Having poor appetite even with encouragement and 1:1 assistance.

## 2019-07-06 NOTE — PROGRESS NOTES
Pt alert and oriented x1, only to self. Offered dinner tray. Consumed 25 to 50% of the meal. Incontinence care done. Assisted to turn to sides. Safety precautions in placed. Bed in lowest position. Upper side rails up. Treaded socks on. Bed in lowest position.

## 2019-07-06 NOTE — PROGRESS NOTES
2 RN skin check complete with Karma  Devices in place; bilateral soft wrist restraints.  Skin assessed under devices Yes, pinkish red and blanching.  Confirmed pressure ulcers found on  N/A.  New potential pressure ulcers noted on N/A    Noted pinkish blanching, bilateral back of the ears. Bruise on the left shoulder. Pinkish to red blanching spot on the back.  Skin tear on bilateral arms. Skin tag and bruise in abdominal area. Bruise spot in left lateral chest. Scabs seen in lower extremities.   The following interventions in place; assisted to turn to sides every 2 hours. Incontinence care done. On pressure redistribution mattress.

## 2019-07-06 NOTE — PROGRESS NOTES
Pt's hearing aids have run out of battery and there are no replacements available.   Hearing aids placed in a bag with pt sticker on it and put in pt belongings closet.

## 2019-07-06 NOTE — CARE PLAN
Problem: Safety  Goal: Will remain free from injury  Outcome: PROGRESSING AS EXPECTED  Assess restraints min q2 hours, room next to nurses station, assist pt to EOB and to stand as tolerated.  Continued use of wrist restraints as pt pulls dressing of and picks at wound    Problem: Discharge Barriers/Planning  Goal: Patient's continuum of care needs will be met  Outcome: PROGRESSING AS EXPECTED  Continued bilat soft wrist restraints, dressing to pacemaker CDI, turning pt. 1:1 assistance with meals.

## 2019-07-06 NOTE — PROGRESS NOTES
Hospital Medicine Daily Progress Note    Date of Service  7/6/2019    Chief Complaint  Bradycardia and syncope    Hospital Course   This is a 89-year-old female with PMH significant for CKD 3, hypothyroidism, dementia and [place] who transferred here from outside facility for AV block thought to be Mobitz 2.  She underwent PPM placement on 6/21/2019 by cardiology.  Her post procedure course was complicated by ongoing confusion and attempts to pick at her PPM site incision requiring restraints.     Interval Problem Update  7/5:  Scab noted to left chest wall.  No drainage to area.  Afebrile.  Disoriented.  Requiring restraints.    7/6:  Crackles throughout lung fields.  Nonproductive cough noted.  Does not appear short of breath.  Afebrile.  Chest x-ray ordered.      Consultants/Specialty  -Cardiology - signed off    Code Status  DNAR/DNI    Disposition  Her currently Senior Living Facility is not comfortable taking her back until her PPM site has healed. PT/OT recommend HH - referral placed.    Review of Systems  Review of Systems   Unable to perform ROS: Dementia        Physical Exam  Temp:  [36.1 °C (97 °F)-37.2 °C (99 °F)] 36.1 °C (97 °F)  Pulse:  [57-78] 57  Resp:  [16-20] 16  BP: (153-182)/(61-74) 155/61  SpO2:  [94 %-96 %] 96 %    Physical Exam   Constitutional: Vital signs are normal. She appears well-developed and well-nourished. She is sleeping and uncooperative. She is easily aroused. No distress.   Frail   HENT:   Head: Normocephalic and atraumatic.   Right Ear: External ear normal. Decreased hearing is noted.   Left Ear: External ear normal. Decreased hearing is noted.   VERY [place]   Eyes: Conjunctivae are normal. No scleral icterus.   Neck: Normal range of motion. Neck supple.   Cardiovascular: Normal rate and intact distal pulses.  Exam reveals no friction rub.    Murmur heard.   Systolic murmur is present with a grade of 3/6   PPM site with dressing CDI   Pulmonary/Chest: Effort normal. No stridor. No  respiratory distress. She has rhonchi in the right middle field, the right lower field, the left middle field and the left lower field.   Abdominal: Soft. Bowel sounds are normal. She exhibits no distension. There is no tenderness.   Musculoskeletal:   Generalized weakness.   Neurological: She is alert and easily aroused. She is disoriented. No cranial nerve deficit.   Skin: Skin is warm. No rash noted. She is not diaphoretic. There is erythema.   PPM site with surrounding erythema and skin tear.  Area showing concern for infection.  Dressing intact.    Psychiatric: Her behavior is normal. Her affect is labile. Cognition and memory are impaired. She expresses impulsivity and inappropriate judgment. She exhibits abnormal recent memory and abnormal remote memory.       Fluids    Intake/Output Summary (Last 24 hours) at 07/06/19 1045  Last data filed at 07/06/19 0600   Gross per 24 hour   Intake              400 ml   Output                0 ml   Net              400 ml       Laboratory  Recent Labs      07/05/19   0341   WBC  7.6   RBC  4.20   HEMOGLOBIN  12.3   HEMATOCRIT  39.6   MCV  94.3   MCH  29.3   MCHC  31.1*   RDW  48.0   PLATELETCT  221   MPV  9.8     Recent Labs      07/05/19   0341   SODIUM  136   POTASSIUM  4.0   CHLORIDE  104   CO2  27   GLUCOSE  85   BUN  15   CREATININE  0.79   CALCIUM  8.8                   Imaging  DX-CHEST-PORTABLE (1 VIEW)   Final Result      1.  Mild bibasilar atelectasis/scarring and trace right effusion.   2.  Unipolar pacer lead projects appropriately.      DX-CHEST-LIMITED (1 VIEW)   Final Result      1.  There is slight improvement in changes of interstitial edema with trace amount of pleural fluid.   2.  Pacemaker lead projects over the cardiac silhouette. There is no visible pneumothorax.      DX-CHEST-PORTABLE (1 VIEW)   Final Result         1.  Pulmonary edema and/or infiltrates are identified, which appear somewhat increased since the prior exam.   2.  Atherosclerosis       DX-CHEST-PORTABLE (1 VIEW)   Final Result      Mild worsening reticular opacification may indicate edema      New left pacer with no evidence of pneumothorax      EC-ECHOCARDIOGRAM COMPLETE W/O CONT   Final Result      DX-CHEST-PORTABLE (1 VIEW)   Final Result      No acute cardiac or pulmonary abnormality is noted.      CL-PERMANENT PACEMAKER INSERTION    (Results Pending)   DX-CHEST-PORTABLE (1 VIEW)    (Results Pending)        Assessment/Plan  * Late onset Alzheimer's disease with behavioral disturbance- (present on admission)   Assessment & Plan    -Patient has severe underlying dementia  -she is very [place] likely contributing to her delusions and behaviors  -Continue risperdal, seroquel, and celexa.  -Continue restraints to avoid PPM site wound contamination  -PRN haldol.         Cellulitis   Assessment & Plan    Left chest wall around pacemaker site.  Secondary to patient picking at surgical wound  Continue augmentin and doxycycline.   Use restraints as needed to protect site.      AV block- (present on admission)   Assessment & Plan    -s/p PPM placement 6/21  -HR stable.      Generalized weakness   Assessment & Plan    PT/OT   May need therapy with return to memory care facility.     S/P placement of cardiac pacemaker   Assessment & Plan    -6/21/2019  -She has been afebrile without leukocytosis  -Area of concern medial to the PPM site on the sternum.  Baseball size area of questionable fluid or blood collection. X-ray showed no acute findings  -site appears more concerning for infection - see picture loaded to media section today. Started 5 day course of ABX  -wound culture negative.   -will likely need to remain in the hospital until pacemaker site is completely healed.      Normocytic anemia- (present on admission)   Assessment & Plan    -Iron, vitamin B12, and folate all WNL  -Follow labs     Syncopal episodes- (present on admission)   Assessment & Plan    -due to AV block  -now resolved after PPM  placement         Stage 3 chronic kidney disease (HCC)- (present on admission)   Assessment & Plan    -chronic  -Avoid nephrotoxins  -Renally dose medications as indicated  -Follow labs       Hypothyroidism- (present on admission)   Assessment & Plan    -TSH low/free t4 elevated  -Levothyroxine decreased from 175 to 150 on 6/22  -TSH in 4-6 weeks            VTE prophylaxis: heparin.    DU Manzanares.

## 2019-07-06 NOTE — CARE PLAN
Problem: Safety  Goal: Will remain free from falls  Outcome: PROGRESSING AS EXPECTED  Reinforced the use of call light when needing assistance. Treaded socks on. Bed on lowest position. Personal belongings within reach. Clutter free environment provided.     Problem: Skin Integrity  Goal: Risk for impaired skin integrity will decrease  Outcome: PROGRESSING AS EXPECTED  Incontinence care done. Assisted to turn to sides.

## 2019-07-06 NOTE — PROGRESS NOTES
2 RN skin check complete w/ Ivelisse RN.   Devices in place: Nasal canula   Skin assessed under devices: Yes.  Confirmed pressure ulcers found on: N/A.  New potential pressure ulcers noted: N/A. Wound consult placed: N/A.  The following interventions in place: Q2 turns, waffle mattress overlay, dressings in place to wounds, heels floated in boots, checking frequently for incontinence.     Bruising to upper chest, incision covered by dressing to L chest. Bruising to RUE & LUE. Skin tear to L upper arm x2, covered by dressing. Elbows pink and blanching bilaterally. Ears red and blanching bilaterally. Heels red and blanching bilaterally. Outer aspect of L foot red & slowly blanching, heel float boots applied. Coccyx pink and blanching.    1846 Pt re-opened upper right back of arm tear. New dressing applied.

## 2019-07-06 NOTE — PROGRESS NOTES
2 RN skin check complete w/ Manuel RN.   Devices in place: Nasal canula and hearing aids  Skin assessed under devices: Yes.  Confirmed pressure ulcers found on: N/A.  New potential pressure ulcers noted: N/A. Wound consult placed: N/A.  The following interventions in place: Q2 turns, waffle mattress overlay, dressings in place to wounds, heels floated, checking frequently for incontinence.     Bruising to upper chest, incision covered by dressing to L chest. Bruising to RUE & LUE. Skin tear to R upper arm, covered by dressing. Skin tear to L upper arm x2, covered by dressing. Elbows pink and blanching bilaterally. Ears red and blanching bilaterally. Heels red and blanching bilaterally. Outer aspect of L foot red & slowly blanching, mepilex placed. Coccyx pink and blanching.

## 2019-07-07 PROBLEM — J81.1 PULMONARY EDEMA: Status: ACTIVE | Noted: 2019-07-07

## 2019-07-07 PROBLEM — R23.9 IMPAIRED SKIN INTEGRITY: Status: ACTIVE | Noted: 2019-07-07

## 2019-07-07 LAB
BNP SERPL-MCNC: 531 PG/ML (ref 0–100)
EKG IMPRESSION: NORMAL

## 2019-07-07 PROCEDURE — A9270 NON-COVERED ITEM OR SERVICE: HCPCS | Performed by: INTERNAL MEDICINE

## 2019-07-07 PROCEDURE — 36415 COLL VENOUS BLD VENIPUNCTURE: CPT

## 2019-07-07 PROCEDURE — A9270 NON-COVERED ITEM OR SERVICE: HCPCS | Performed by: NURSE PRACTITIONER

## 2019-07-07 PROCEDURE — 83880 ASSAY OF NATRIURETIC PEPTIDE: CPT

## 2019-07-07 PROCEDURE — 700102 HCHG RX REV CODE 250 W/ 637 OVERRIDE(OP): Performed by: INTERNAL MEDICINE

## 2019-07-07 PROCEDURE — 700111 HCHG RX REV CODE 636 W/ 250 OVERRIDE (IP): Performed by: INTERNAL MEDICINE

## 2019-07-07 PROCEDURE — 700102 HCHG RX REV CODE 250 W/ 637 OVERRIDE(OP): Performed by: NURSE PRACTITIONER

## 2019-07-07 PROCEDURE — 93005 ELECTROCARDIOGRAM TRACING: CPT | Performed by: NURSE PRACTITIONER

## 2019-07-07 PROCEDURE — 93010 ELECTROCARDIOGRAM REPORT: CPT | Performed by: INTERNAL MEDICINE

## 2019-07-07 PROCEDURE — 99233 SBSQ HOSP IP/OBS HIGH 50: CPT | Performed by: NURSE PRACTITIONER

## 2019-07-07 PROCEDURE — 770006 HCHG ROOM/CARE - MED/SURG/GYN SEMI*

## 2019-07-07 RX ORDER — QUETIAPINE FUMARATE 25 MG/1
25 TABLET, FILM COATED ORAL DAILY
Status: DISCONTINUED | OUTPATIENT
Start: 2019-07-07 | End: 2019-07-07

## 2019-07-07 RX ORDER — QUETIAPINE FUMARATE 25 MG/1
12.5 TABLET, FILM COATED ORAL 2 TIMES DAILY
Status: DISCONTINUED | OUTPATIENT
Start: 2019-07-07 | End: 2019-07-19 | Stop reason: HOSPADM

## 2019-07-07 RX ORDER — FUROSEMIDE 20 MG/1
20 TABLET ORAL ONCE
Status: COMPLETED | OUTPATIENT
Start: 2019-07-07 | End: 2019-07-07

## 2019-07-07 RX ORDER — QUETIAPINE FUMARATE 25 MG/1
12.5 TABLET, FILM COATED ORAL 2 TIMES DAILY
Status: DISCONTINUED | OUTPATIENT
Start: 2019-07-07 | End: 2019-07-07

## 2019-07-07 RX ADMIN — QUETIAPINE FUMARATE 12.5 MG: 25 TABLET ORAL at 14:32

## 2019-07-07 RX ADMIN — FUROSEMIDE 20 MG: 20 TABLET ORAL at 14:32

## 2019-07-07 RX ADMIN — HEPARIN SODIUM 5000 UNITS: 5000 INJECTION, SOLUTION INTRAVENOUS; SUBCUTANEOUS at 14:30

## 2019-07-07 ASSESSMENT — PAIN SCALES - PAIN ASSESSMENT IN ADVANCED DEMENTIA (PAINAD)
BREATHING: NORMAL
NEGVOCALIZATION: OCCASIONAL MOAN/GROAN, LOW SPEECH, NEGATIVE/DISAPPROVING QUALITY
FACIALEXPRESSION: SMILING OR INEXPRESSIVE
BREATHING: NORMAL
FACIALEXPRESSION: SMILING OR INEXPRESSIVE
BODYLANGUAGE: RELAXED
CONSOLABILITY: NO NEED TO CONSOLE
CONSOLABILITY: NO NEED TO CONSOLE
TOTALSCORE: 0
TOTALSCORE: 1
BODYLANGUAGE: RELAXED

## 2019-07-07 NOTE — PROGRESS NOTES
2 RN skin check complete w/ MARIBELL Mcclain.   Devices in place - bilateral soft wrist restraints, nasal canula.  Skin assessed under devices - yes.  Confirmed pressure ulcers found on - none.  New potential pressure ulcers noted on - none.   The following interventions in place - Q2 turn, waffle cushion, pillows, grey foam over nasal canula, dressings maintained over wounds/tears    -Skin tears noted to both upper extremities, clean dressing in place  -Wound/tear over pace maker has bruising and small amount of drainage, dressing maintained per orders  -Skin tear to L calf, dressing placed  -Generalized bruising to extremities and abdomen   -Small scab to middle upper back, no redness and appears healing

## 2019-07-07 NOTE — PROGRESS NOTES
Noted redness blanching in bilateral wrist where the soft restraints placed. Pt keep trying to remove the restraints. Education provided.

## 2019-07-07 NOTE — ASSESSMENT & PLAN NOTE
-skin is very thin & frail with scattered skin tears, bruises  -nursing skin care protocol  - wound care  -continue to encourage PO intake. Supplements.

## 2019-07-07 NOTE — PROGRESS NOTES
Pt restless and agitated this AM. New orders obtained. Pt then fell asleep and has been resting peacefully since, currently holding PO meds since pt hasn't slept well in past few days. Will attempt to give meds when awake.

## 2019-07-07 NOTE — PROGRESS NOTES
"Pt alert and oriented x1, only to self. On soft wrist restraints, bilateral upper extremities. Pt is shouting \" Help! I need help\". Needs attended. Assisted to comfortable position. Safety precautions in placed. Bed in lowest position. Upper side rails up. Treaded socks on. Bed in lowest position.  "

## 2019-07-07 NOTE — PROGRESS NOTES
"Encouraged to take medicine, crushed in apple sauce. Medication given. Pt spit the medicine. Pt said \"leave me alone please, please get out of here\". Education provided. Stil, pt doesn't want to take medication  "

## 2019-07-07 NOTE — ASSESSMENT & PLAN NOTE
Increase in size of small bilateral pleural effusions on x-ray. BNP elevated. Echo stable.  - S/P 1 x dose of lasix  - Wean o2 as tolerated, currently on 3L NC  - repeat CXR today, pending

## 2019-07-07 NOTE — CARE PLAN
Problem: Safety  Goal: Will remain free from falls  Outcome: PROGRESSING AS EXPECTED  Reinforced the use of call light when needing assistance. Treaded socks on. Bed on lowest position. Personal belongings within reach. Clutter free environment provided.     Problem: Psychosocial Needs:  Goal: Level of anxiety will decrease  Outcome: PROGRESSING SLOWER THAN EXPECTED  Encouraged to sleep. Calm and quiet environment provided

## 2019-07-07 NOTE — PROGRESS NOTES
Hospital Medicine Daily Progress Note    Date of Service  7/7/2019    Chief Complaint  Bradycardia and syncope    Hospital Course   This is a 89-year-old female with PMH significant for CKD 3, hypothyroidism, dementia and [place] who transferred here from outside facility for AV block thought to be Mobitz 2.  She underwent PPM placement on 6/21/2019 by cardiology.  Her post procedure course was complicated by ongoing confusion and attempts to pick at her PPM site incision requiring restraints.     Interval Problem Update  7/5:  Scab noted to left chest wall.  No drainage to area.  Afebrile.  Disoriented.  Requiring restraints.    7/6:  Crackles throughout lung fields.  Nonproductive cough noted.  Does not appear short of breath.  Afebrile.  Chest x-ray ordered.    7/7:  Mild pulmonary edema on chest x-ray.  Give one time dose of lasix.  No evidence of respiratory compromise at this time.  Continues to be restless with frequent verbal outbursts.  Restraints in place.     Consultants/Specialty  -Cardiology - signed off    Code Status  DNAR/DNI    Disposition  Her currently Senior Living Facility is not comfortable taking her back until her PPM site has healed. PT/OT recommend HH - referral placed.    Review of Systems  Review of Systems   Unable to perform ROS: Dementia        Physical Exam  Temp:  [36.3 °C (97.4 °F)-36.7 °C (98 °F)] 36.5 °C (97.7 °F)  Pulse:  [60-61] 61  Resp:  [15-20] 15  BP: (140-163)/(50-80) 152/50  SpO2:  [92 %-96 %] 96 %    Physical Exam   Constitutional: Vital signs are normal. She appears well-developed and well-nourished. She is sleeping and uncooperative. She is easily aroused.   Frail   HENT:   Head: Normocephalic and atraumatic.   Right Ear: Decreased hearing is noted.   Left Ear: Decreased hearing is noted.   Mouth/Throat: No oropharyngeal exudate.   VERY [place]   Eyes: Conjunctivae are normal. Right eye exhibits no discharge. Left eye exhibits no discharge.   Neck: Normal range of motion. Neck  supple. No tracheal deviation present.   Cardiovascular: Normal rate and intact distal pulses.    Murmur heard.   Systolic murmur is present with a grade of 3/6   PPM site with dressing CDI   Pulmonary/Chest: Effort normal. No stridor. No respiratory distress. She has no wheezes. She has rhonchi in the right middle field, the right lower field, the left middle field and the left lower field.   Abdominal: Soft. Bowel sounds are normal. She exhibits no distension. There is no tenderness. There is no rebound.   Musculoskeletal:   Generalized weakness.   Neurological: She is alert and easily aroused. She is disoriented.   Skin: Skin is warm. No rash noted. She is not diaphoretic. There is erythema.   PPM site with surrounding erythema and skin tear.  Area showing concern for infection.  Dressing intact.   Multiple skin tears with dressings intact.   Psychiatric: Her mood appears anxious. Her affect is labile. She is agitated. Cognition and memory are impaired. She expresses impulsivity and inappropriate judgment. She exhibits abnormal recent memory and abnormal remote memory.       Fluids    Intake/Output Summary (Last 24 hours) at 07/07/19 1016  Last data filed at 07/06/19 1950   Gross per 24 hour   Intake              100 ml   Output                0 ml   Net              100 ml       Laboratory  Recent Labs      07/05/19   0341   WBC  7.6   RBC  4.20   HEMOGLOBIN  12.3   HEMATOCRIT  39.6   MCV  94.3   MCH  29.3   MCHC  31.1*   RDW  48.0   PLATELETCT  221   MPV  9.8     Recent Labs      07/05/19   0341   SODIUM  136   POTASSIUM  4.0   CHLORIDE  104   CO2  27   GLUCOSE  85   BUN  15   CREATININE  0.79   CALCIUM  8.8         Recent Labs      07/07/19   0257   BNPBTYPENAT  531*           Imaging  DX-CHEST-PORTABLE (1 VIEW)   Final Result      1.  Increase in size of small bilateral pleural effusions.      2.  Unchanged cardiomegaly.      3.  Persistent diffuse interstitial opacity which represent interstitial edema,  pneumonia, or fibrosis.      DX-CHEST-PORTABLE (1 VIEW)   Final Result      1.  Mild bibasilar atelectasis/scarring and trace right effusion.   2.  Unipolar pacer lead projects appropriately.      DX-CHEST-LIMITED (1 VIEW)   Final Result      1.  There is slight improvement in changes of interstitial edema with trace amount of pleural fluid.   2.  Pacemaker lead projects over the cardiac silhouette. There is no visible pneumothorax.      DX-CHEST-PORTABLE (1 VIEW)   Final Result         1.  Pulmonary edema and/or infiltrates are identified, which appear somewhat increased since the prior exam.   2.  Atherosclerosis      DX-CHEST-PORTABLE (1 VIEW)   Final Result      Mild worsening reticular opacification may indicate edema      New left pacer with no evidence of pneumothorax      EC-ECHOCARDIOGRAM COMPLETE W/O CONT   Final Result      DX-CHEST-PORTABLE (1 VIEW)   Final Result      No acute cardiac or pulmonary abnormality is noted.      CL-PERMANENT PACEMAKER INSERTION    (Results Pending)        Assessment/Plan  * Late onset Alzheimer's disease with behavioral disturbance- (present on admission)   Assessment & Plan    -Patient has severe underlying dementia  -Behavioral distrubance  -she is very [place] likely contributing to her delusions and behaviors  -D/C risperdal  -Start seroquel BID.  -Continue celexa.  -IM haldol as needed for increased agitation.  -Repeat EKG ordered to evaluate Qtc prolongation.   -Continue restraints to avoid PPM site wound contamination  -PRN haldol.         Cellulitis   Assessment & Plan    Left chest wall around pacemaker site.  Secondary to patient picking at surgical wound  Continue augmentin and doxycycline.   Use restraints as needed to protect site.      AV block- (present on admission)   Assessment & Plan    -s/p PPM placement 6/21  -HR stable.      Impaired skin integrity   Assessment & Plan    Multiple skin tears.  Continue appropriate wound care.  Encourage adequate  nutrition  Skin care protocol.      Pulmonary edema   Assessment & Plan    Increase in size of small bilateral pleural effusions on x-ray  BNP elevated.  Echo stable.  Give one time dose of lasix and monitor.      Generalized weakness   Assessment & Plan    PT/OT   May need therapy with return to [facility] care facility.     S/P placement of cardiac pacemaker   Assessment & Plan    -6/21/2019  -She has been afebrile without leukocytosis  -Area of concern medial to the PPM site on the sternum.  Baseball size area of questionable fluid or blood collection. X-ray showed no acute findings  -site appears more concerning for infection - see picture loaded to media section today. Started 5 day course of ABX  -wound culture negative.   -will likely need to remain in the hospital until pacemaker site is completely healed.      Normocytic anemia- (present on admission)   Assessment & Plan    -Iron, vitamin B12, and folate all WNL  -Follow labs     Syncopal episodes- (present on admission)   Assessment & Plan    -due to AV block  -now resolved after PPM placement         Stage 3 chronic kidney disease (HCC)- (present on admission)   Assessment & Plan    -chronic  -Avoid nephrotoxins  -Renally dose medications as indicated  -Follow labs       Hypothyroidism- (present on admission)   Assessment & Plan    -TSH low/free t4 elevated  -Levothyroxine decreased from 175 to 150 on 6/22  -TSH in 4-6 weeks            VTE prophylaxis: heparin.    DU Manzanares.

## 2019-07-07 NOTE — PROGRESS NOTES
2 RN skin check complete with Karma  Devices in place; bilateral soft wrist restraints.  Skin assessed under devices Yes, pinkish red and blanching.  Confirmed pressure ulcers found on  N/A.   New potential pressure ulcers noted on N/A    Noted pinkish blanching, bilateral back of the ears. Skin tear noted on right posterior shoulder. Bruise on the left shoulder. Pinkish to red blanching spot on the back.  Skin tear on bilateral arms. Skin tag and bruise in abdominal area. Bruise spot in left lateral chest. Scabs seen in lower extremities.       The following interventions in place; assisted to turn to sides every 2 hours. Incontinence care done. On pressure redistribution mattress.

## 2019-07-07 NOTE — CARE PLAN
Problem: Safety  Goal: Will remain free from injury  Outcome: PROGRESSING AS EXPECTED  Pt still requiring bilat soft wrist restraints to maintain safety, reinforcement done on reason for restraints but pt forgetful and unable to retain. Bed alarm on    Problem: Skin Integrity  Goal: Risk for impaired skin integrity will decrease  Outcome: PROGRESSING AS EXPECTED  Dressings to skin tears and tear over pacemaker in place, turning q2 and pillows in use

## 2019-07-08 ENCOUNTER — APPOINTMENT (OUTPATIENT)
Dept: RADIOLOGY | Facility: MEDICAL CENTER | Age: 84
DRG: 243 | End: 2019-07-08
Attending: INTERNAL MEDICINE
Payer: MEDICARE

## 2019-07-08 LAB
ANION GAP SERPL CALC-SCNC: 7 MMOL/L (ref 0–11.9)
BUN SERPL-MCNC: 18 MG/DL (ref 8–22)
CALCIUM SERPL-MCNC: 9.2 MG/DL (ref 8.5–10.5)
CHLORIDE SERPL-SCNC: 104 MMOL/L (ref 96–112)
CO2 SERPL-SCNC: 28 MMOL/L (ref 20–33)
CREAT SERPL-MCNC: 1.02 MG/DL (ref 0.5–1.4)
ERYTHROCYTE [DISTWIDTH] IN BLOOD BY AUTOMATED COUNT: 48.1 FL (ref 35.9–50)
GLUCOSE SERPL-MCNC: 91 MG/DL (ref 65–99)
HCT VFR BLD AUTO: 39.8 % (ref 37–47)
HGB BLD-MCNC: 13.1 G/DL (ref 12–16)
MCH RBC QN AUTO: 30.5 PG (ref 27–33)
MCHC RBC AUTO-ENTMCNC: 32.9 G/DL (ref 33.6–35)
MCV RBC AUTO: 92.6 FL (ref 81.4–97.8)
PLATELET # BLD AUTO: 246 K/UL (ref 164–446)
PMV BLD AUTO: 10 FL (ref 9–12.9)
POTASSIUM SERPL-SCNC: 4.7 MMOL/L (ref 3.6–5.5)
RBC # BLD AUTO: 4.3 M/UL (ref 4.2–5.4)
SODIUM SERPL-SCNC: 139 MMOL/L (ref 135–145)
WBC # BLD AUTO: 8.2 K/UL (ref 4.8–10.8)

## 2019-07-08 PROCEDURE — 99232 SBSQ HOSP IP/OBS MODERATE 35: CPT | Performed by: NURSE PRACTITIONER

## 2019-07-08 PROCEDURE — 770006 HCHG ROOM/CARE - MED/SURG/GYN SEMI*

## 2019-07-08 PROCEDURE — 700102 HCHG RX REV CODE 250 W/ 637 OVERRIDE(OP): Performed by: NURSE PRACTITIONER

## 2019-07-08 PROCEDURE — 700102 HCHG RX REV CODE 250 W/ 637 OVERRIDE(OP): Performed by: INTERNAL MEDICINE

## 2019-07-08 PROCEDURE — 80048 BASIC METABOLIC PNL TOTAL CA: CPT

## 2019-07-08 PROCEDURE — 36415 COLL VENOUS BLD VENIPUNCTURE: CPT

## 2019-07-08 PROCEDURE — 71045 X-RAY EXAM CHEST 1 VIEW: CPT

## 2019-07-08 PROCEDURE — 700111 HCHG RX REV CODE 636 W/ 250 OVERRIDE (IP): Performed by: INTERNAL MEDICINE

## 2019-07-08 PROCEDURE — A9270 NON-COVERED ITEM OR SERVICE: HCPCS | Performed by: NURSE PRACTITIONER

## 2019-07-08 PROCEDURE — A9270 NON-COVERED ITEM OR SERVICE: HCPCS | Performed by: INTERNAL MEDICINE

## 2019-07-08 PROCEDURE — 85027 COMPLETE CBC AUTOMATED: CPT

## 2019-07-08 RX ADMIN — DOXYCYCLINE 100 MG: 100 TABLET, FILM COATED ORAL at 11:31

## 2019-07-08 RX ADMIN — AMOXICILLIN AND CLAVULANATE POTASSIUM 1 TABLET: 875; 125 TABLET, FILM COATED ORAL at 11:28

## 2019-07-08 RX ADMIN — SENNOSIDES AND DOCUSATE SODIUM 2 TABLET: 8.6; 5 TABLET ORAL at 11:31

## 2019-07-08 RX ADMIN — DOXYCYCLINE 100 MG: 100 TABLET, FILM COATED ORAL at 22:07

## 2019-07-08 RX ADMIN — CITALOPRAM HYDROBROMIDE 20 MG: 20 TABLET ORAL at 11:29

## 2019-07-08 RX ADMIN — SENNOSIDES AND DOCUSATE SODIUM 2 TABLET: 8.6; 5 TABLET ORAL at 22:07

## 2019-07-08 RX ADMIN — HEPARIN SODIUM 5000 UNITS: 5000 INJECTION, SOLUTION INTRAVENOUS; SUBCUTANEOUS at 11:32

## 2019-07-08 RX ADMIN — LEVOTHYROXINE SODIUM 150 MCG: 75 TABLET ORAL at 11:30

## 2019-07-08 RX ADMIN — HEPARIN SODIUM 5000 UNITS: 5000 INJECTION, SOLUTION INTRAVENOUS; SUBCUTANEOUS at 22:07

## 2019-07-08 RX ADMIN — QUETIAPINE FUMARATE 12.5 MG: 25 TABLET ORAL at 11:24

## 2019-07-08 RX ADMIN — AMOXICILLIN AND CLAVULANATE POTASSIUM 1 TABLET: 875; 125 TABLET, FILM COATED ORAL at 22:06

## 2019-07-08 RX ADMIN — ASPIRIN 81 MG 81 MG: 81 TABLET ORAL at 11:32

## 2019-07-08 RX ADMIN — QUETIAPINE FUMARATE 12.5 MG: 25 TABLET ORAL at 22:07

## 2019-07-08 NOTE — CARE PLAN
Problem: Mobility  Goal: Risk for activity intolerance will decrease  Outcome: PROGRESSING AS EXPECTED  Pt not OOB, turning q2 and repositioning extremities on pillows to support. Pt tolerated well, slept through most turns    Problem: Safety - Medical Restraint  Goal: Remains free of injury from restraints (Restraint for Interference with Medical Device)  INTERVENTIONS:  1. Determine that other, less restrictive measures have been tried or would not be effective before applying the restraint  2. Evaluate the patient's condition at the time of restraint application  3. Inform patient/family regarding the reason for restraint  4. Q2H: Monitor safety, psychosocial status, comfort, nutrition and hydration   Outcome: PROGRESSING AS EXPECTED  Pt has no signs of injury at bilat wrist restraint sites, performing range of motion, and helping pt with eating. Room near nursing station for close monitoring

## 2019-07-08 NOTE — PROGRESS NOTES
2 RN skin check complete w/ MARIBELL Licea.   Devices in place - bilateral soft wrist restraints, nasal canula.  Skin assessed under devices - yes.  Confirmed pressure ulcers found on - none.  New potential pressure ulcers noted on - none.   The following interventions in place - Q2 turn, waffle cushion, pillows, grey foam over nasal canula, dressings maintained over wounds/tears    -Skin tears noted to both upper extremities, clean dressing in place  -Wound/tear over pace maker has bruising and small amount of drainage, dressing maintained per orders  -Scabs to both calves, blanching last wounds  -Generalized bruising to extremities and abdomen   -Small scab to middle upper back, no redness and appears healing

## 2019-07-08 NOTE — CARE PLAN
Problem: Safety  Goal: Will remain free from injury  Safety precautions in place. Bed in low position, treaded socks on, personal possessions in reach, call light in reach and hourly rounding in place. Bed alarm on. Bilateral soft wrist restraints in use with q2h monitoring.    Problem: Venous Thromboembolism (VTW)/Deep Vein Thrombosis (DVT) Prevention:  Goal: Patient will participate in Venous Thrombosis (VTE)/Deep Vein Thrombosis (DVT)Prevention Measures  Pt receiving SQ heparin injections.

## 2019-07-08 NOTE — THERAPY
Speech Therapy Contact Note:    Attempted to see pt for dysphagia tx, however RN reporting pt has been very sleepy today, ate a late lunch and is sleeping soundly. SLP will hold and attempt again later when pt is able to participate.

## 2019-07-08 NOTE — PROGRESS NOTES
2 RN skin check completed with MARIBELL Quintero.   Devices in place: nasal cannula, bilateral soft wrist restraints.  Skin assessed under devices: yes.  Confirmed pressure ulcers found: none.  New potential pressure ulcers noted: none. Wound consult placed:  n/a.    Skin assessment: BUE with skin tears, generalized bruising, heals red/blanching, elbows red/pink/blanching. Sacrum pink/red/blanching.    The following interventions in place: q2h turns, waffle overlay mattress, dressings over skin tears/wounds, pillows for positioning, incontinence monitoring, bed linen changed PRN.

## 2019-07-08 NOTE — PROGRESS NOTES
Pt has been resting peacefully most of shift, arouses easily but is very agitated when disturbed and falls quickly back to sleep. Delayed some AM meds due to lethargy but was able to give later with food and pt tolerated well. Turning q2 hours and doing range of motion as able while patient sleeps and when not agitated. Incontinent of urine but no BM today. Encouraging food and fluids whenever awake. MD team aware of pt sleepiness and current plan is to let patient rest as able since she hadn't slept in days prior. Frequent rounding, room near nursing station, bed alarm on.

## 2019-07-08 NOTE — PROGRESS NOTES
Pt very lethargic and sleepy this evening. No s/sx pain noted at this time. Bilateral soft wrist restraints in use to prevent pt from removing dressing over pacemaker site. Pt on q2h turns with incontinence monitoring. Call light within reach, personal belongings available, bed in lowest position, treaded socks on, and bed alarm on. Hourly rounding in place.

## 2019-07-08 NOTE — PROGRESS NOTES
I certify that the patient requires continued medically necessary hospital services for the treatment of heart block, dementia with delirium, and cellulitis over pacemaker site, and will remain in the hospital in for 30 days. Discharge plan is anticipated to be SNF placement, which is actively being pursued.

## 2019-07-08 NOTE — PROGRESS NOTES
"Hospital Medicine Daily Progress Note    Date of Service  7/8/2019    Chief Complaint  Bradycardia and syncope    Hospital Course   This is a 89-year-old female with PMH significant for CKD 3, hypothyroidism, dementia and [place] who transferred here from outside facility for AV block thought to be Mobitz 2.  She underwent PPM placement on 6/21/2019 by cardiology.  Her post procedure course was complicated by ongoing confusion and attempts to pick at her PPM site incision requiring restraints.     Interval Problem Update  7/5:  Scab noted to left chest wall.  No drainage to area.  Afebrile.  Disoriented.  Requiring restraints.    7/6:  Crackles throughout lung fields.  Nonproductive cough noted.  Does not appear short of breath.  Afebrile.  Chest x-ray ordered.    7/7:  Mild pulmonary edema on chest x-ray.  Give one time dose of lasix.  No evidence of respiratory compromise at this time.  Continues to be restless with frequent verbal outbursts.  Restraints in place.   7/8:  Very sleepy today.  Wakes up to tactile stimuli and states \"Go away\" prior to going back to sleep.  Likely aspect of exhaustion as she has not slept in days.  VSS.     Consultants/Specialty  -Cardiology - signed off    Code Status  DNAR/DNI    Disposition  Her currently Senior Living Facility is not comfortable taking her back until her PPM site has healed. PT/OT recommend HH - referral placed.    Review of Systems  Review of Systems   Unable to perform ROS: Dementia        Physical Exam  Temp:  [36.6 °C (97.8 °F)-36.8 °C (98.3 °F)] 36.6 °C (97.9 °F)  Pulse:  [60-63] 63  Resp:  [16] 16  BP: (118-139)/(38-51) 139/46  SpO2:  [93 %-94 %] 94 %    Physical Exam   Constitutional: Vital signs are normal. She appears well-developed and well-nourished. She is sleeping and uncooperative. She is easily aroused. No distress.   Frail   HENT:   Head: Normocephalic and atraumatic.   Right Ear: External ear normal. Decreased hearing is noted.   Left Ear: External " ear normal. Decreased hearing is noted.   Eyes: Conjunctivae are normal. No scleral icterus.   Neck: Normal range of motion. Neck supple. No JVD present.   Cardiovascular: Normal rate and intact distal pulses.  Exam reveals no friction rub.    Murmur heard.   Systolic murmur is present with a grade of 3/6   PPM site with dressing CDI   Pulmonary/Chest: Effort normal. No stridor. No respiratory distress. She has no wheezes. She has rhonchi in the right middle field, the right lower field, the left middle field and the left lower field. She exhibits no tenderness.   Abdominal: Soft. Bowel sounds are normal. She exhibits no distension. There is no tenderness.   Musculoskeletal: She exhibits no edema.   Generalized weakness.   Neurological: She is easily aroused. She is disoriented. No cranial nerve deficit.   Lethargic   Skin: Skin is warm. She is not diaphoretic. There is erythema.   PPM site with surrounding erythema and skin tear.  Area showing concern for infection.  Dressing intact.   Multiple skin tears with dressings intact.   Psychiatric: Her mood appears anxious. Her affect is labile. She is agitated. Cognition and memory are impaired. She expresses impulsivity and inappropriate judgment. She exhibits abnormal recent memory and abnormal remote memory.   Nursing note and vitals reviewed.      Fluids    Intake/Output Summary (Last 24 hours) at 07/08/19 1326  Last data filed at 07/07/19 1443   Gross per 24 hour   Intake              240 ml   Output                0 ml   Net              240 ml       Laboratory  Recent Labs      07/08/19 0221   WBC  8.2   RBC  4.30   HEMOGLOBIN  13.1   HEMATOCRIT  39.8   MCV  92.6   MCH  30.5   MCHC  32.9*   RDW  48.1   PLATELETCT  246   MPV  10.0     Recent Labs      07/08/19   0221   SODIUM  139   POTASSIUM  4.7   CHLORIDE  104   CO2  28   GLUCOSE  91   BUN  18   CREATININE  1.02   CALCIUM  9.2         Recent Labs      07/07/19 0257   BNPBTYPENAT  531*            Imaging  DX-CHEST-PORTABLE (1 VIEW)   Final Result      1.  Blunting of the costophrenic angles likely represent small pleural effusions. Adjacent airspace opacification may be secondary to atelectasis or pneumonia.      2.  Increased perihilar interstitial markings are suggestive of mild edema.      DX-CHEST-PORTABLE (1 VIEW)   Final Result      1.  Increase in size of small bilateral pleural effusions.      2.  Unchanged cardiomegaly.      3.  Persistent diffuse interstitial opacity which represent interstitial edema, pneumonia, or fibrosis.      DX-CHEST-PORTABLE (1 VIEW)   Final Result      1.  Mild bibasilar atelectasis/scarring and trace right effusion.   2.  Unipolar pacer lead projects appropriately.      DX-CHEST-LIMITED (1 VIEW)   Final Result      1.  There is slight improvement in changes of interstitial edema with trace amount of pleural fluid.   2.  Pacemaker lead projects over the cardiac silhouette. There is no visible pneumothorax.      DX-CHEST-PORTABLE (1 VIEW)   Final Result         1.  Pulmonary edema and/or infiltrates are identified, which appear somewhat increased since the prior exam.   2.  Atherosclerosis      DX-CHEST-PORTABLE (1 VIEW)   Final Result      Mild worsening reticular opacification may indicate edema      New left pacer with no evidence of pneumothorax      EC-ECHOCARDIOGRAM COMPLETE W/O CONT   Final Result      DX-CHEST-PORTABLE (1 VIEW)   Final Result      No acute cardiac or pulmonary abnormality is noted.      CL-PERMANENT PACEMAKER INSERTION    (Results Pending)        Assessment/Plan  * Late onset Alzheimer's disease with behavioral disturbance- (present on admission)   Assessment & Plan    -Patient has severe underlying dementia  -Behavioral distrubance  -she is very [place] likely contributing to her delusions and behaviors  -Continue celexa and seroquel  -IM haldol as needed for increased agitation.  -Repeat EKG ordered to evaluate Qtc prolongation.   -Continue  restraints to avoid PPM site wound contamination  -PRN haldol.         Cellulitis   Assessment & Plan    Left chest wall around pacemaker site.  Secondary to patient picking at surgical wound  Continue augmentin and doxycycline.   Use restraints as needed to protect site.      AV block- (present on admission)   Assessment & Plan    -s/p PPM placement 6/21  -HR stable.      Impaired skin integrity   Assessment & Plan    Multiple skin tears.  Continue appropriate wound care.  Encourage adequate nutrition  Skin care protocol.      Pulmonary edema   Assessment & Plan    Increase in size of small bilateral pleural effusions on x-ray  BNP elevated.  Echo stable.  S/P 1 x dose of lasix.  Wean o2 as tolerated.  Intermittent chest x-rays      Generalized weakness   Assessment & Plan    PT/OT   May need therapy with return to [facility] facility.     S/P placement of cardiac pacemaker   Assessment & Plan    -6/21/2019  -She has been afebrile without leukocytosis  -Area of concern medial to the PPM site on the sternum.  Baseball size area of questionable fluid or blood collection. X-ray showed no acute findings  -site appears more concerning for infection - see picture loaded to media section today. Started 5 day course of ABX  -wound culture negative.   -will likely need to remain in the hospital until pacemaker site is completely healed.      Normocytic anemia- (present on admission)   Assessment & Plan    -Iron, vitamin B12, and folate all WNL  -Follow labs     Syncopal episodes- (present on admission)   Assessment & Plan    -due to AV block  -now resolved after PPM placement         Stage 3 chronic kidney disease (HCC)- (present on admission)   Assessment & Plan    -chronic  -Avoid nephrotoxins  -Renally dose medications as indicated  -Follow labs       Hypothyroidism- (present on admission)   Assessment & Plan    -TSH low/free t4 elevated  -Levothyroxine decreased from 175 to 150 on 6/22  -TSH in 4-6 weeks            VTE  prophylaxis: heparin.    KEHINDE Manzanares

## 2019-07-09 LAB — BNP SERPL-MCNC: 216 PG/ML (ref 0–100)

## 2019-07-09 PROCEDURE — 99232 SBSQ HOSP IP/OBS MODERATE 35: CPT | Performed by: INTERNAL MEDICINE

## 2019-07-09 PROCEDURE — 97530 THERAPEUTIC ACTIVITIES: CPT

## 2019-07-09 PROCEDURE — 700102 HCHG RX REV CODE 250 W/ 637 OVERRIDE(OP): Performed by: INTERNAL MEDICINE

## 2019-07-09 PROCEDURE — 97535 SELF CARE MNGMENT TRAINING: CPT

## 2019-07-09 PROCEDURE — 83880 ASSAY OF NATRIURETIC PEPTIDE: CPT

## 2019-07-09 PROCEDURE — A9270 NON-COVERED ITEM OR SERVICE: HCPCS | Performed by: INTERNAL MEDICINE

## 2019-07-09 PROCEDURE — 770006 HCHG ROOM/CARE - MED/SURG/GYN SEMI*

## 2019-07-09 PROCEDURE — 700102 HCHG RX REV CODE 250 W/ 637 OVERRIDE(OP): Performed by: NURSE PRACTITIONER

## 2019-07-09 PROCEDURE — A9270 NON-COVERED ITEM OR SERVICE: HCPCS | Performed by: NURSE PRACTITIONER

## 2019-07-09 PROCEDURE — 36415 COLL VENOUS BLD VENIPUNCTURE: CPT

## 2019-07-09 PROCEDURE — 700111 HCHG RX REV CODE 636 W/ 250 OVERRIDE (IP): Performed by: INTERNAL MEDICINE

## 2019-07-09 RX ADMIN — SENNOSIDES AND DOCUSATE SODIUM 2 TABLET: 8.6; 5 TABLET ORAL at 17:20

## 2019-07-09 RX ADMIN — HEPARIN SODIUM 5000 UNITS: 5000 INJECTION, SOLUTION INTRAVENOUS; SUBCUTANEOUS at 17:24

## 2019-07-09 RX ADMIN — HEPARIN SODIUM 5000 UNITS: 5000 INJECTION, SOLUTION INTRAVENOUS; SUBCUTANEOUS at 08:29

## 2019-07-09 RX ADMIN — LEVOTHYROXINE SODIUM 150 MCG: 75 TABLET ORAL at 08:28

## 2019-07-09 RX ADMIN — CITALOPRAM HYDROBROMIDE 20 MG: 20 TABLET ORAL at 08:28

## 2019-07-09 RX ADMIN — QUETIAPINE FUMARATE 12.5 MG: 25 TABLET ORAL at 17:20

## 2019-07-09 RX ADMIN — SENNOSIDES AND DOCUSATE SODIUM 2 TABLET: 8.6; 5 TABLET ORAL at 08:28

## 2019-07-09 RX ADMIN — HEPARIN SODIUM 5000 UNITS: 5000 INJECTION, SOLUTION INTRAVENOUS; SUBCUTANEOUS at 23:20

## 2019-07-09 RX ADMIN — QUETIAPINE FUMARATE 12.5 MG: 25 TABLET ORAL at 08:28

## 2019-07-09 RX ADMIN — AMOXICILLIN AND CLAVULANATE POTASSIUM 1 TABLET: 875; 125 TABLET, FILM COATED ORAL at 08:56

## 2019-07-09 RX ADMIN — DOXYCYCLINE 100 MG: 100 TABLET, FILM COATED ORAL at 08:28

## 2019-07-09 RX ADMIN — ASPIRIN 81 MG 81 MG: 81 TABLET ORAL at 08:28

## 2019-07-09 ASSESSMENT — COGNITIVE AND FUNCTIONAL STATUS - GENERAL
TOILETING: TOTAL
CLIMB 3 TO 5 STEPS WITH RAILING: TOTAL
EATING MEALS: A LITTLE
DAILY ACTIVITIY SCORE: 12
MOVING TO AND FROM BED TO CHAIR: UNABLE
TURNING FROM BACK TO SIDE WHILE IN FLAT BAD: A LOT
DRESSING REGULAR UPPER BODY CLOTHING: A LOT
STANDING UP FROM CHAIR USING ARMS: A LOT
HELP NEEDED FOR BATHING: A LOT
MOVING FROM LYING ON BACK TO SITTING ON SIDE OF FLAT BED: UNABLE
WALKING IN HOSPITAL ROOM: A LOT
DRESSING REGULAR LOWER BODY CLOTHING: A LOT
MOBILITY SCORE: 9
SUGGESTED CMS G CODE MODIFIER DAILY ACTIVITY: CL
SUGGESTED CMS G CODE MODIFIER MOBILITY: CM
PERSONAL GROOMING: A LOT

## 2019-07-09 ASSESSMENT — GAIT ASSESSMENTS: GAIT LEVEL OF ASSIST: UNABLE TO PARTICIPATE

## 2019-07-09 NOTE — PROGRESS NOTES
Pt alert/oriented to self, no s/sx pain/discomfort noted at this time. Pt remains on bilateral soft wrist restraints. Pt arousable to take medication. Call light within reach, personal belongings available, bed in lowest position, treaded socks on, and bed alarm on. Hourly rounding in place.

## 2019-07-09 NOTE — THERAPY
"Physical Therapy Treatment completed.   Bed Mobility:  Supine to Sit: Moderate Assist  Transfers: Sit to Stand: Maximal Assist  Gait: Level Of Assist: Unable to Participate with Will Continue to Assess for Equipment Needs       Plan of Care: Will benefit from Physical Therapy 2 times per week  Discharge Recommendations: Equipment: Will Continue to Assess for Equipment Needs. Post-acute therapy Discharge to a transitional care facility for continued skilled therapy services.     See \"Rehab Therapy-Acute\" Patient Summary Report for complete documentation.     Pt was confused throughout treatment session. Pt would yell \"help,\" but when asked if she needed anything she replied \"nothing.\" Pt was very itchy and would ask to have her back scratched. Unable to follow 1 step commands. Pt required max A for STS and standing balance. Acute care PT will continue follow pt while in house. Continue to recommend that pt return to a 24/7 care facility.  "

## 2019-07-09 NOTE — PROGRESS NOTES
Assumed care of patient at shift change; Pt was asleep and in restraints; I was told that she was fairly calm most of the night and sleeping; Around 0900 I woke her up to administer her pills and she became agitated by me trying to give her medication; After multiple attempts I did gey her to take all of her due medications (crushed in AS); Patient is alert and oriented to self; Pt is still restrained because she is still a risk for harming her pacemaker incision site - Frequent checks, frequent circulation checks, frequent food/snack/drink offerings, frequent range of motion exercises; She is now calling out every so often but it is much less today than I have seen in the past week;    Patient concerns at this time: N/A

## 2019-07-09 NOTE — PROGRESS NOTES
2 RN skin check completed with MARIBELL Quintero.   Devices in place: nasal cannula, bilateral soft restraints.  Skin assessed under devices: yes.  Confirmed pressure ulcers found: none.  New potential pressure ulcers noted: none. Wound consult placed:  n/a.    Skin assessment: heels pink/red/blanching. Elbows red/blanching. Generalized bruising/scabbing. Scab mid upper back. Skin tears BUE. Sacrum pink/red/blanching.    The following interventions in place: waffle overlay mattress, q2h turns, gray foam ear pads, pillows for support/positioning, dressings over wounds/skin tears. Incontinence monitoring. Bed linen changed PRN.

## 2019-07-09 NOTE — CARE PLAN
Problem: Nutritional:  Goal: Achieve adequate nutritional intake  Patient will consume >50% of meals   Outcome: PROGRESSING AS EXPECTED  Pt confused, in restraints, unable to answer questions about intake. No PO documentation for yesterday or today. <25% meal intake and 100% Boost at lunch on 7/7. Weight stable. Per RN, pt likes yogurt. Requrested PO documented for today. RN agreeable.

## 2019-07-09 NOTE — PROGRESS NOTES
"Hospital Medicine Daily Progress Note    Date of Service  7/9/2019    Chief Complaint  Bradycardia and syncope.    Hospital Course   This is a 89-year-old female with PMH significant for CKD 3, hypothyroidism, dementia and [place] who transferred here from outside facility for AV block thought to be Mobitz 2.  She underwent PPM placement on 6/21/2019 by cardiology.  Her post procedure course was complicated by ongoing confusion and attempts to pick at her PPM site incision requiring restraints.      Interval Problem Update  -completed 5 day course empiric ABX for PPM site cellulitis today. Discussed with EP PAUL, Celine Gresham, who will pass along to team for someone to come evaluate.  -continues to require restraints to keep her from picking at wound.  -extremely [place]. Able to tell me \"I have no pain\".    Consultants/Specialty  -Cardiology - signed off    Code Status  DNAR/DNI    Disposition  Her currently Senior Living Facility is not comfortable taking her back until her PPM site has healed. PT/OT recommend HH - referral placed    Review of Systems  Review of Systems   Unable to perform ROS: Dementia (and VERY [place])        Physical Exam  Temp:  [36.4 °C (97.5 °F)-36.4 °C (97.6 °F)] 36.4 °C (97.5 °F)  Pulse:  [60-62] 61  Resp:  [16] 16  BP: (117-143)/(43-71) 134/71  SpO2:  [97 %-99 %] 99 %    Physical Exam   Constitutional: Vital signs are normal. She appears well-nourished. She is sleeping and uncooperative. She is easily aroused. No distress.   Frail, hard of hearing.    HENT:   Head: Normocephalic.   Right Ear: Decreased hearing is noted.   Left Ear: Decreased hearing is noted.   Nose: Nose normal.   Mouth/Throat: Oropharynx is clear and moist.   VERY [place]   Eyes: Pupils are equal, round, and reactive to light. Conjunctivae and EOM are normal. No scleral icterus.   Neck: Normal range of motion. Neck supple. No JVD present.   Cardiovascular: Normal rate and intact distal pulses.  Exam reveals no friction rub.    Murmur " heard.   Systolic murmur is present with a grade of 3/6   PPM site with dressing CDI without drainage and without erythema   Pulmonary/Chest: Effort normal. No respiratory distress. She has decreased breath sounds. She has no rhonchi. She has no rales.   Abdominal: Soft. Bowel sounds are normal. She exhibits no distension and no mass. There is no tenderness. There is no rebound.   Musculoskeletal: Normal range of motion. She exhibits no edema or tenderness.   Generalized weakness   Lymphadenopathy:     She has no cervical adenopathy.   Neurological: She is easily aroused. She is disoriented. She displays atrophy. She exhibits abnormal muscle tone.   Skin: Skin is warm. No rash noted.   Psychiatric: She has a normal mood and affect. Her behavior is normal. Judgment normal. Cognition and memory are impaired. She exhibits abnormal recent memory and abnormal remote memory.       Fluids  No intake or output data in the 24 hours ending 07/09/19 1429    Laboratory  Recent Labs      07/08/19   0221   WBC  8.2   RBC  4.30   HEMOGLOBIN  13.1   HEMATOCRIT  39.8   MCV  92.6   MCH  30.5   MCHC  32.9*   RDW  48.1   PLATELETCT  246   MPV  10.0     Recent Labs      07/08/19   0221   SODIUM  139   POTASSIUM  4.7   CHLORIDE  104   CO2  28   GLUCOSE  91   BUN  18   CREATININE  1.02   CALCIUM  9.2         Recent Labs      07/07/19   0257  07/09/19   0310   BNPBTYPENAT  531*  216*           Imaging  DX-CHEST-PORTABLE (1 VIEW)   Final Result      1.  Blunting of the costophrenic angles likely represent small pleural effusions. Adjacent airspace opacification may be secondary to atelectasis or pneumonia.      2.  Increased perihilar interstitial markings are suggestive of mild edema.      DX-CHEST-PORTABLE (1 VIEW)   Final Result      1.  Increase in size of small bilateral pleural effusions.      2.  Unchanged cardiomegaly.      3.  Persistent diffuse interstitial opacity which represent interstitial edema, pneumonia, or fibrosis.       DX-CHEST-PORTABLE (1 VIEW)   Final Result      1.  Mild bibasilar atelectasis/scarring and trace right effusion.   2.  Unipolar pacer lead projects appropriately.      DX-CHEST-LIMITED (1 VIEW)   Final Result      1.  There is slight improvement in changes of interstitial edema with trace amount of pleural fluid.   2.  Pacemaker lead projects over the cardiac silhouette. There is no visible pneumothorax.      DX-CHEST-PORTABLE (1 VIEW)   Final Result         1.  Pulmonary edema and/or infiltrates are identified, which appear somewhat increased since the prior exam.   2.  Atherosclerosis      DX-CHEST-PORTABLE (1 VIEW)   Final Result      Mild worsening reticular opacification may indicate edema      New left pacer with no evidence of pneumothorax      EC-ECHOCARDIOGRAM COMPLETE W/O CONT   Final Result      DX-CHEST-PORTABLE (1 VIEW)   Final Result      No acute cardiac or pulmonary abnormality is noted.      CL-PERMANENT PACEMAKER INSERTION    (Results Pending)        Assessment/Plan  * Late onset Alzheimer's disease with behavioral disturbance- (present on admission)   Assessment & Plan    -Patient has severe underlying dementia  -Behavioral distrubance  -she is very [place] likely contributing to her delusions and behaviors  -Continue celexa and seroquel  -IM haldol as needed for increased agitation.  -Continue restraints to avoid PPM site wound contamination  -PRN haldol.         Cellulitis   Assessment & Plan    -PPM site left chest. Tiger Text sent to PAUL Juarez, for EP Cardiology who will pass along to team for someone to come evaluate  -completed 5 days of empiric ABX coverage today  -afebrile. No leukocytosis       AV block- (present on admission)   Assessment & Plan    -s/p PPM placement 6/21  -HR 60's  -Fall Precautions     Impaired skin integrity- (present on admission)   Assessment & Plan    -skin is very thin & frail with scattered skin tears, bruises  -nursing skin care protocol  -continue to  encourage PO intake. Supplements.         Pulmonary edema   Assessment & Plan    Increase in size of small bilateral pleural effusions on x-ray  BNP elevated.  Echo stable.  S/P 1 x dose of lasix.  Wean o2 as tolerated.  Intermittent chest x-rays      Generalized weakness- (present on admission)   Assessment & Plan    -PT/OT   -likely will need Home Health versus SNF placement         S/P placement of cardiac pacemaker   Assessment & Plan    -6/21/2019  -She has been afebrile without leukocytosis  -Area of concern medial to the PPM site on the sternum.  Baseball size area of questionable fluid or blood collection. X-ray showed no acute findings  -site appears more concerning for infection - see picture loaded to media section today. Started 5 day course of ABX  -wound culture negative.   -will likely need to remain in the hospital until pacemaker site is completely healed.      Normocytic anemia- (present on admission)   Assessment & Plan    -Iron, vitamin B12, and folate all WNL  -Follow labs     Syncopal episodes- (present on admission)   Assessment & Plan    -due to AV block  -now resolved after PPM placement         Stage 3 chronic kidney disease (HCC)- (present on admission)   Assessment & Plan    -chronic  -Avoid nephrotoxins  -Renally dose medications as indicated  -Follow labs       Hypothyroidism- (present on admission)   Assessment & Plan    -TSH low/free t4 elevated  -Levothyroxine decreased from 175 to 150 on 6/22  -TSH in 4-6 weeks            VTE prophylaxis: Heparin    KEHINDE Farris

## 2019-07-09 NOTE — CARE PLAN
Problem: Safety  Goal: Will remain free from falls  Safety precautions in place. Bilateral soft wrist restraints in use. Bed in low position, treaded socks on, personal possessions in reach, call light in reach and bed alarm on.    Problem: Infection  Goal: Will remain free from infection  Pt afebrile, receiving PO augmentin.

## 2019-07-10 PROCEDURE — 700102 HCHG RX REV CODE 250 W/ 637 OVERRIDE(OP): Performed by: NURSE PRACTITIONER

## 2019-07-10 PROCEDURE — A9270 NON-COVERED ITEM OR SERVICE: HCPCS | Performed by: INTERNAL MEDICINE

## 2019-07-10 PROCEDURE — 770006 HCHG ROOM/CARE - MED/SURG/GYN SEMI*

## 2019-07-10 PROCEDURE — 700111 HCHG RX REV CODE 636 W/ 250 OVERRIDE (IP): Performed by: INTERNAL MEDICINE

## 2019-07-10 PROCEDURE — 700102 HCHG RX REV CODE 250 W/ 637 OVERRIDE(OP): Performed by: INTERNAL MEDICINE

## 2019-07-10 PROCEDURE — A9270 NON-COVERED ITEM OR SERVICE: HCPCS | Performed by: NURSE PRACTITIONER

## 2019-07-10 PROCEDURE — 99232 SBSQ HOSP IP/OBS MODERATE 35: CPT | Performed by: INTERNAL MEDICINE

## 2019-07-10 RX ADMIN — TRAZODONE HYDROCHLORIDE 50 MG: 50 TABLET ORAL at 20:10

## 2019-07-10 RX ADMIN — QUETIAPINE FUMARATE 12.5 MG: 25 TABLET ORAL at 04:45

## 2019-07-10 RX ADMIN — QUETIAPINE FUMARATE 12.5 MG: 25 TABLET ORAL at 16:57

## 2019-07-10 RX ADMIN — HALOPERIDOL LACTATE 1 MG: 5 INJECTION, SOLUTION INTRAMUSCULAR at 21:01

## 2019-07-10 RX ADMIN — SENNOSIDES AND DOCUSATE SODIUM 2 TABLET: 8.6; 5 TABLET ORAL at 04:45

## 2019-07-10 RX ADMIN — ASPIRIN 81 MG 81 MG: 81 TABLET ORAL at 04:45

## 2019-07-10 RX ADMIN — CITALOPRAM HYDROBROMIDE 20 MG: 20 TABLET ORAL at 04:44

## 2019-07-10 RX ADMIN — HEPARIN SODIUM 5000 UNITS: 5000 INJECTION, SOLUTION INTRAVENOUS; SUBCUTANEOUS at 13:39

## 2019-07-10 RX ADMIN — HEPARIN SODIUM 5000 UNITS: 5000 INJECTION, SOLUTION INTRAVENOUS; SUBCUTANEOUS at 20:10

## 2019-07-10 RX ADMIN — LEVOTHYROXINE SODIUM 150 MCG: 75 TABLET ORAL at 04:45

## 2019-07-10 RX ADMIN — SENNOSIDES AND DOCUSATE SODIUM 2 TABLET: 8.6; 5 TABLET ORAL at 16:57

## 2019-07-10 RX ADMIN — HEPARIN SODIUM 5000 UNITS: 5000 INJECTION, SOLUTION INTRAVENOUS; SUBCUTANEOUS at 04:45

## 2019-07-10 RX ADMIN — ACETAMINOPHEN 650 MG: 325 TABLET, FILM COATED ORAL at 04:45

## 2019-07-10 ASSESSMENT — PAIN SCALES - PAIN ASSESSMENT IN ADVANCED DEMENTIA (PAINAD)
TOTALSCORE: 0
CONSOLABILITY: NO NEED TO CONSOLE
TOTALSCORE: 0
FACIALEXPRESSION: SMILING OR INEXPRESSIVE
FACIALEXPRESSION: SMILING OR INEXPRESSIVE
BODYLANGUAGE: RELAXED
BREATHING: NORMAL
BREATHING: NORMAL
CONSOLABILITY: NO NEED TO CONSOLE
BODYLANGUAGE: RELAXED

## 2019-07-10 NOTE — PROGRESS NOTES
2 RN skin check completed with MARIBELL Quintero.   Devices in place:  bilateral soft restraints, nasal cannula  Skin assessed under devices: yes.  Confirmed pressure ulcers found: none.  New potential pressure ulcers noted: none. Wound consult placed:  n/a.     Skin assessment: heels pink/red/blanching. Elbows red/blanching.  Scab mid upper back. Generalized bruising/scabbing.Skin tears BUE. Sacrum pink/red/blanching.     The following interventions in place: waffle overlay mattress, q2h turns, gray foam ear pads, pillows for support/positioning, dressings over wounds/skin tears. Incontinence monitoring. Bed linen changed PRN.

## 2019-07-10 NOTE — PROGRESS NOTES
2 RN skin check completed with MARIBELL Marino.   Devices in place nasal cannula, bilateral soft non-violent wrist restraints.  Skin assessed under devices yes.  Confirmed pressure ulcers found on none.  New potential pressure ulcers noted on none. Wound consult placed n/a.    Skin assessment: heels pink/red/blanching. Elbows red/blanching. Scab mid upper back. Generalized bruising scabbing. BUE each have large skin tears with mepalite in place. Sacrum is pink/red and blanching. Back of head is pink/slow to paty.     The following interventions in place waffle overlay mattress, q 2 hr turns, gray foam ear pads, pillows in use for support and positioning, dressings over wounds/skin tears. Incontinence monitoring. Bed linen changed as needed.

## 2019-07-10 NOTE — THERAPY
"Occupational Therapy Treatment completed with focus on ADLs and ADL transfers.  Functional Status:  Pt very [place], required Mod A for supine to sit EOB.  Pt asked to have her back scratched.  Applied lotion to her back & extremities & encouraged pt to help rub in the lotion.  Pt stood with Mod A but declined to sit up in the chair.  Pt just kept repeating \"put me back in bed\"  \"I need to lay down.\"  Plan of Care: Patient with no further skilled OT needs in the acute care setting at this time  Discharge Recommendations:  Equipment No Equipment Needed. Post-acute therapy Discharge to a transitional care facility for continued skilled therapy services.    Given pt's Dementia & cognitive impairments it's difficult for pt to follow directions & participate in ADL's.  Pt likely is functioning close to her baseline.  Will not actively follow pt for Acute OT but will remain available for D/C needs.    See \"Rehab Therapy-Acute\" Patient Summary Report for complete documentation.   "

## 2019-07-10 NOTE — PROGRESS NOTES
Bedside report received. Pt is A&Ox1 to self only. Pt was asleep during change of shift, Pt is in bilateral soft non-violent wrist restraints due to pt pulling off dressing pacemaker incision site. q 2 turns in place, frequent checks for circulation and frequent food snack, drink offerings. POC discussed with pt; all questions answered at this time.

## 2019-07-10 NOTE — CARE PLAN
Problem: Safety  Goal: Will remain free from falls  Outcome: PROGRESSING AS EXPECTED  Fall precautions in place. Bed in lowest position. Non-skid socks in place. Personal possessions within reach. Mobility sign on door. Bed-alarm on. Call light within reach. Pt educated regarding fall prevention and states understanding.     Problem: Skin Integrity  Goal: Risk for impaired skin integrity will decrease  Outcome: PROGRESSING AS EXPECTED  Pt turned and positioned every two hours. Incontinence care provided and barrier paste applied as needed.

## 2019-07-10 NOTE — CARE PLAN
Problem: Safety  Goal: Will remain free from injury  Pt confused. Bilateral soft wrist restraints in use. Safety precautions in place. Bed in low position, treaded socks on, personal possessions in reach, call light in reach and bed alarm on.    Problem: Discharge Barriers/Planning  Goal: Patient's continuum of care needs will be met  Pt pending placement once pacemaker surgical site is completely healed.

## 2019-07-10 NOTE — PROGRESS NOTES
Pt remains very sleepy/fatigued. Bilateral wrist restraints in use due to pt removing dressing on left upper chest on pacemaker site. Pt on q2h turns. No s/sx pain/discomfort. Call light within reach, personal belongings available, bed in lowest position, treaded socks on, and bed alarm on. Hourly rounding in place.

## 2019-07-10 NOTE — PROGRESS NOTES
Hospital Medicine Daily Progress Note    Date of Service  7/10/2019    Chief Complaint  Bradycardia and syncope.    Hospital Course   This is a 89-year-old female with PMH significant for CKD 3, hypothyroidism, dementia and [place] who transferred here from outside facility for AV block thought to be Mobitz 2.  She underwent PPM placement on 6/21/2019 by cardiology.  Her post procedure course was complicated by ongoing confusion and attempts to pick at her PPM site incision requiring restraints.      Interval Problem Update  7/10. Patient still very confused.  Still require restraint.  Patient's left chest pacemaker pocket incision still not healed completely still require dressing coverage.  -continues to require restraints to keep her from picking at wound.  Unable to finish review of system due to dementia    Consultants/Specialty  -Cardiology -reconsulted to evaluated pacemaker pocket incision.    Code Status  DNAR/DNI    Disposition  Her currently Senior Living Facility is not comfortable taking her back until her PPM site has healed. PT/OT recommend HH - referral placed    Review of Systems  Review of Systems   Unable to perform ROS: Dementia (and VERY [place])        Physical Exam  Temp:  [36.2 °C (97.1 °F)-36.9 °C (98.4 °F)] 36.2 °C (97.1 °F)  Pulse:  [60-64] 64  Resp:  [15-18] 15  BP: (138-157)/(45-64) 138/64  SpO2:  [95 %-98 %] 96 %    Physical Exam   Constitutional: Vital signs are normal. She appears well-nourished. She is sleeping and uncooperative. She is easily aroused. No distress.   Frail, hard of hearing.    HENT:   Head: Normocephalic.   Right Ear: Decreased hearing is noted.   Left Ear: Decreased hearing is noted.   Nose: Nose normal.   Mouth/Throat: Oropharynx is clear and moist.   VERY [place]   Eyes: Pupils are equal, round, and reactive to light. Conjunctivae and EOM are normal. No scleral icterus.   Neck: Normal range of motion. Neck supple. No JVD present.   Cardiovascular: Normal rate and intact  distal pulses.  Exam reveals no friction rub.    Murmur heard.   Systolic murmur is present with a grade of 3/6   PPM site with dressing CDI without drainage and without erythema   Pulmonary/Chest: Effort normal. No respiratory distress. She has decreased breath sounds. She has no rhonchi. She has no rales.   Abdominal: Soft. Bowel sounds are normal. She exhibits no distension and no mass. There is no tenderness. There is no rebound.   Musculoskeletal: Normal range of motion. She exhibits no edema or tenderness.   Generalized weakness   Lymphadenopathy:     She has no cervical adenopathy.   Neurological: She is easily aroused. She is disoriented. She displays atrophy. She exhibits abnormal muscle tone.   Skin: Skin is warm. No rash noted.   Psychiatric: She has a normal mood and affect. Her behavior is normal. Judgment normal. Cognition and memory are impaired. She exhibits abnormal recent memory and abnormal remote memory.       Fluids    Intake/Output Summary (Last 24 hours) at 07/10/19 1646  Last data filed at 07/10/19 0900   Gross per 24 hour   Intake              480 ml   Output                0 ml   Net              480 ml       Laboratory  Recent Labs      07/08/19   0221   WBC  8.2   RBC  4.30   HEMOGLOBIN  13.1   HEMATOCRIT  39.8   MCV  92.6   MCH  30.5   MCHC  32.9*   RDW  48.1   PLATELETCT  246   MPV  10.0     Recent Labs      07/08/19   0221   SODIUM  139   POTASSIUM  4.7   CHLORIDE  104   CO2  28   GLUCOSE  91   BUN  18   CREATININE  1.02   CALCIUM  9.2         Recent Labs      07/09/19   0310   BNPBTYPENAT  216*           Imaging  DX-CHEST-PORTABLE (1 VIEW)   Final Result      1.  Blunting of the costophrenic angles likely represent small pleural effusions. Adjacent airspace opacification may be secondary to atelectasis or pneumonia.      2.  Increased perihilar interstitial markings are suggestive of mild edema.      DX-CHEST-PORTABLE (1 VIEW)   Final Result      1.  Increase in size of small  bilateral pleural effusions.      2.  Unchanged cardiomegaly.      3.  Persistent diffuse interstitial opacity which represent interstitial edema, pneumonia, or fibrosis.      DX-CHEST-PORTABLE (1 VIEW)   Final Result      1.  Mild bibasilar atelectasis/scarring and trace right effusion.   2.  Unipolar pacer lead projects appropriately.      DX-CHEST-LIMITED (1 VIEW)   Final Result      1.  There is slight improvement in changes of interstitial edema with trace amount of pleural fluid.   2.  Pacemaker lead projects over the cardiac silhouette. There is no visible pneumothorax.      DX-CHEST-PORTABLE (1 VIEW)   Final Result         1.  Pulmonary edema and/or infiltrates are identified, which appear somewhat increased since the prior exam.   2.  Atherosclerosis      DX-CHEST-PORTABLE (1 VIEW)   Final Result      Mild worsening reticular opacification may indicate edema      New left pacer with no evidence of pneumothorax      EC-ECHOCARDIOGRAM COMPLETE W/O CONT   Final Result      DX-CHEST-PORTABLE (1 VIEW)   Final Result      No acute cardiac or pulmonary abnormality is noted.      CL-PERMANENT PACEMAKER INSERTION    (Results Pending)        Assessment/Plan  * Late onset Alzheimer's disease with behavioral disturbance- (present on admission)   Assessment & Plan    -Patient has severe underlying dementia  -Behavioral distrubance  -she is very [place] likely contributing to her delusions and behaviors  -Continue celexa and seroquel  -IM haldol as needed for increased agitation.  -Continue restraints to avoid PPM site wound contamination  -PRN haldol.  Patient currently still require restraint.       Cellulitis   Assessment & Plan    -PPM site left chest. Tiger Text sent to PAUL Juarez, for EP Cardiology who will pass along to team for someone to come evaluate  -completed 5 days of empiric ABX coverage today  -afebrile. No leukocytosis  Patient's left pacemaker pocket still not healed completely.  Still require  dressing coverage.  Patient is unable to be off restraints due to above reason.     AV block- (present on admission)   Assessment & Plan    -s/p PPM placement 6/21  -HR 60's  -Fall Precautions  Patient's left pacemaker pocket still not healed completely.  Still require dressing coverage.  Patient is unable to be off restraints due to above reason.     Impaired skin integrity- (present on admission)   Assessment & Plan    -skin is very thin & frail with scattered skin tears, bruises  -nursing skin care protocol  -continue to encourage PO intake. Supplements.    Patient's left pacemaker pocket still not healed completely.  Still require dressing coverage.  Patient is unable to be off restraints due to above reason.     Pulmonary edema   Assessment & Plan    Increase in size of small bilateral pleural effusions on x-ray  BNP elevated.  Echo stable.  S/P 1 x dose of lasix.  Wean o2 as tolerated.  Intermittent chest x-rays      Generalized weakness- (present on admission)   Assessment & Plan    -PT/OT   -likely will need Home Health versus SNF placement         S/P placement of cardiac pacemaker   Assessment & Plan    -6/21/2019  -She has been afebrile without leukocytosis  -Area of concern medial to the PPM site on the sternum.  Baseball size area of questionable fluid or blood collection. X-ray showed no acute findings  -site appears more concerning for infection - see picture loaded to media section today. Started 5 day course of ABX  -wound culture negative.   -will likely need to remain in the hospital until pacemaker site is completely healed.     Patient's left pacemaker pocket still not healed completely.  Still require dressing coverage.  Patient is unable to be off restraints due to above reason.     Normocytic anemia- (present on admission)   Assessment & Plan    -Iron, vitamin B12, and folate all WNL  -Follow labs     Syncopal episodes- (present on admission)   Assessment & Plan    -due to AV block  -now  resolved after PPM placement         Stage 3 chronic kidney disease (HCC)- (present on admission)   Assessment & Plan    -chronic  -Avoid nephrotoxins  -Renally dose medications as indicated  -Follow labs       Hypothyroidism- (present on admission)   Assessment & Plan    -TSH low/free t4 elevated  -Levothyroxine decreased from 175 to 150 on 6/22  -TSH in 4-6 weeks            VTE prophylaxis: Heparin    Lizzy Carcamo M.D.       Current Facility-Administered Medications:   •  QUEtiapine (SEROQUEL) tablet 12.5 mg, 12.5 mg, Oral, BID, Juaquin Bailey, A.P.R.N., 12.5 mg at 07/10/19 0445  •  haloperidol lactate (HALDOL) injection 1 mg, 1 mg, Intramuscular, BID PRN, Lizzy Carcamo M.D., 1 mg at 07/06/19 2133  •  levothyroxine (SYNTHROID) tablet 150 mcg, 150 mcg, Oral, AM ES, Nichole Heller M.D., 150 mcg at 07/10/19 0445  •  aspirin (ASA) chewable tab 81 mg, 81 mg, Oral, DAILY, Leanne Poe M.D., 81 mg at 07/10/19 0445  •  citalopram (CELEXA) tablet 20 mg, 20 mg, Oral, DAILY, Leanne Poe M.D., 20 mg at 07/10/19 0444  •  traZODone (DESYREL) tablet 50 mg, 50 mg, Oral, HS PRN, Leanne Poe M.D., 50 mg at 07/06/19 1952  •  senna-docusate (PERICOLACE or SENOKOT S) 8.6-50 MG per tablet 2 Tab, 2 Tab, Oral, BID, 2 Tab at 07/10/19 0445 **AND** polyethylene glycol/lytes (MIRALAX) PACKET 1 Packet, 1 Packet, Oral, QDAY PRN **AND** magnesium hydroxide (MILK OF MAGNESIA) suspension 30 mL, 30 mL, Oral, QDAY PRN **AND** bisacodyl (DULCOLAX) suppository 10 mg, 10 mg, Rectal, QDAY PRN, Perdomo Ilya Lui, M.D.  •  Respiratory Care per Protocol, , Nebulization, Continuous RT, Leanne Poe M.D.  •  heparin injection 5,000 Units, 5,000 Units, Subcutaneous, Q8HRS, Leanne Poe M.D., 5,000 Units at 07/10/19 0137  •  acetaminophen (TYLENOL) tablet 650 mg, 650 mg, Oral, Q6HRS PRN, Leanne Poe M.D., 650 mg at 07/10/19 3734

## 2019-07-11 LAB — MAGNESIUM SERPL-MCNC: 2.2 MG/DL (ref 1.5–2.5)

## 2019-07-11 PROCEDURE — 770006 HCHG ROOM/CARE - MED/SURG/GYN SEMI*

## 2019-07-11 PROCEDURE — 700111 HCHG RX REV CODE 636 W/ 250 OVERRIDE (IP): Performed by: INTERNAL MEDICINE

## 2019-07-11 PROCEDURE — A9270 NON-COVERED ITEM OR SERVICE: HCPCS | Performed by: NURSE PRACTITIONER

## 2019-07-11 PROCEDURE — 83735 ASSAY OF MAGNESIUM: CPT

## 2019-07-11 PROCEDURE — A9270 NON-COVERED ITEM OR SERVICE: HCPCS | Performed by: INTERNAL MEDICINE

## 2019-07-11 PROCEDURE — 700102 HCHG RX REV CODE 250 W/ 637 OVERRIDE(OP): Performed by: INTERNAL MEDICINE

## 2019-07-11 PROCEDURE — 99232 SBSQ HOSP IP/OBS MODERATE 35: CPT | Performed by: INTERNAL MEDICINE

## 2019-07-11 PROCEDURE — 700102 HCHG RX REV CODE 250 W/ 637 OVERRIDE(OP): Performed by: NURSE PRACTITIONER

## 2019-07-11 PROCEDURE — 36415 COLL VENOUS BLD VENIPUNCTURE: CPT

## 2019-07-11 RX ADMIN — HEPARIN SODIUM 5000 UNITS: 5000 INJECTION, SOLUTION INTRAVENOUS; SUBCUTANEOUS at 14:17

## 2019-07-11 RX ADMIN — LEVOTHYROXINE SODIUM 150 MCG: 75 TABLET ORAL at 08:21

## 2019-07-11 RX ADMIN — ASPIRIN 81 MG 81 MG: 81 TABLET ORAL at 08:19

## 2019-07-11 RX ADMIN — HALOPERIDOL LACTATE 1 MG: 5 INJECTION, SOLUTION INTRAMUSCULAR at 00:55

## 2019-07-11 RX ADMIN — QUETIAPINE FUMARATE 12.5 MG: 25 TABLET ORAL at 08:19

## 2019-07-11 RX ADMIN — TRAZODONE HYDROCHLORIDE 50 MG: 50 TABLET ORAL at 20:12

## 2019-07-11 RX ADMIN — CITALOPRAM HYDROBROMIDE 20 MG: 20 TABLET ORAL at 08:19

## 2019-07-11 RX ADMIN — SENNOSIDES AND DOCUSATE SODIUM 2 TABLET: 8.6; 5 TABLET ORAL at 17:41

## 2019-07-11 RX ADMIN — SENNOSIDES AND DOCUSATE SODIUM 2 TABLET: 8.6; 5 TABLET ORAL at 08:24

## 2019-07-11 RX ADMIN — QUETIAPINE FUMARATE 12.5 MG: 25 TABLET ORAL at 17:41

## 2019-07-11 RX ADMIN — HEPARIN SODIUM 5000 UNITS: 5000 INJECTION, SOLUTION INTRAVENOUS; SUBCUTANEOUS at 08:18

## 2019-07-11 ASSESSMENT — PAIN SCALES - PAIN ASSESSMENT IN ADVANCED DEMENTIA (PAINAD)
BREATHING: NORMAL
TOTALSCORE: 0
CONSOLABILITY: NO NEED TO CONSOLE
FACIALEXPRESSION: SMILING OR INEXPRESSIVE
BODYLANGUAGE: RELAXED

## 2019-07-11 NOTE — PROGRESS NOTES
Assumed care of Pt at shift change. Pt is A&Ox1 reorientation unsucessful. Pt denies pain. Call light with in reach. Traction socks on pt and bed in lowest position. RN used whiteboard to communicate with pt.

## 2019-07-11 NOTE — PROGRESS NOTES
"Pt is confused, remains in bilateral soft wrist restraints. Pt managed to remove dressing placed over left upper chest pacemaker. Dressing reapplied. Pt agitated throughout the night and calling out \"help me\" or \"I want to go my own bed\". Medicated for agitation. Call light within reach, personal belongings available, bed in lowest position, treaded socks on, and bed alarm on. Hourly rounding in place.      "

## 2019-07-11 NOTE — PROGRESS NOTES
2 RN skin check completed with MARIBELL Navarro.   Devices in place: bilateral soft wrist restraints, nasal cannula. .  Skin assessed under devices: yes.  Confirmed pressure ulcers found: none.  New potential pressure ulcers noted: none. Wound consult placed:  n/a.    Skin assessment: sacrum red/pink/blanching. Heels/elbows are pink/red/blanching. Generalized bruising/scabbing. Skin tears on BUE    The following interventions in place: q2h turns, waffle overlay mattress, pillows for support/positioning, gray foam ear pads, dressings over skin tears/wounds. Incontinence monitoring with bed linen changes as needed.

## 2019-07-11 NOTE — PROGRESS NOTES
Hospital Medicine Daily Progress Note    Date of Service  7/11/2019    Chief Complaint  Bradycardia and syncope.    Hospital Course   This is a 89-year-old female with PMH significant for CKD 3, hypothyroidism, dementia and [place] who transferred here from outside facility for AV block thought to be Mobitz 2.  She underwent PPM placement on 6/21/2019 by cardiology.  Her post procedure course was complicated by ongoing confusion and attempts to pick at her PPM site incision requiring restraints.      Interval Problem Update  7/10. Patient still very confused.  Still require restraint.  Patient's left chest pacemaker pocket incision still not healed completely still require dressing coverage.  -continues to require restraints to keep her from picking at wound.  Unable to finish review of system due to dementia  7/11.  Patient still require restraint.  Denies significant overnight acute event.  Overall pleasant.  Complains of general body achiness. Patient's pain is general, 2-3/10, intermittent and does not radiate to other location, sharp and with some tingling. Can be controlled by pain meds. Dressing in place.      Consultants/Specialty  -Cardiology -reconsulted to evaluated pacemaker pocket incision.    Code Status  DNAR/DNI    Disposition  Her currently Senior Living Facility is not comfortable taking her back until her PPM site has healed. PT/OT recommend HH - referral placed    Review of Systems  Review of Systems   Unable to perform ROS: Dementia (and VERY [place])        Physical Exam  Temp:  [36.2 °C (97.1 °F)-36.4 °C (97.5 °F)] 36.2 °C (97.1 °F)  Pulse:  [60-64] 60  Resp:  [15-17] 17  BP: (130-166)/(56-69) 130/64  SpO2:  [95 %-96 %] 95 %    Physical Exam   Constitutional: Vital signs are normal. She appears well-nourished. She is sleeping and uncooperative. She is easily aroused. No distress.   Frail, hard of hearing.    HENT:   Head: Normocephalic.   Right Ear: External ear normal. Decreased hearing is noted.    Left Ear: External ear normal. Decreased hearing is noted.   Nose: Nose normal.   Mouth/Throat: Oropharynx is clear and moist.   VERY [place]   Eyes: Pupils are equal, round, and reactive to light. Conjunctivae and EOM are normal. No scleral icterus.   Neck: Normal range of motion. Neck supple. No JVD present.   Cardiovascular: Normal rate and intact distal pulses.  Exam reveals no friction rub.    Murmur heard.   Systolic murmur is present with a grade of 3/6   PPM site with dressing CDI without drainage and without erythema   Pulmonary/Chest: Effort normal. No respiratory distress. She has decreased breath sounds. She has no wheezes. She has no rhonchi. She has no rales.   Abdominal: Soft. Bowel sounds are normal. She exhibits no distension and no mass. There is no tenderness. There is no rebound.   Musculoskeletal: Normal range of motion. She exhibits no tenderness.   Generalized weakness   Lymphadenopathy:     She has no cervical adenopathy.   Neurological: She is easily aroused. She is disoriented. She displays atrophy. No cranial nerve deficit. She exhibits abnormal muscle tone.   Skin: Skin is warm. No rash noted.   Left chest wound pocket no signs of infection but not healed   Psychiatric: She has a normal mood and affect. Her behavior is normal. Judgment normal. Cognition and memory are impaired. She exhibits abnormal recent memory and abnormal remote memory.       Fluids    Intake/Output Summary (Last 24 hours) at 07/11/19 1605  Last data filed at 07/10/19 1950   Gross per 24 hour   Intake              250 ml   Output                0 ml   Net              250 ml       Laboratory              Recent Labs      07/09/19   0310   BNPBTYPENAT  216*           Imaging  DX-CHEST-PORTABLE (1 VIEW)   Final Result      1.  Blunting of the costophrenic angles likely represent small pleural effusions. Adjacent airspace opacification may be secondary to atelectasis or pneumonia.      2.  Increased perihilar interstitial  markings are suggestive of mild edema.      DX-CHEST-PORTABLE (1 VIEW)   Final Result      1.  Increase in size of small bilateral pleural effusions.      2.  Unchanged cardiomegaly.      3.  Persistent diffuse interstitial opacity which represent interstitial edema, pneumonia, or fibrosis.      DX-CHEST-PORTABLE (1 VIEW)   Final Result      1.  Mild bibasilar atelectasis/scarring and trace right effusion.   2.  Unipolar pacer lead projects appropriately.      DX-CHEST-LIMITED (1 VIEW)   Final Result      1.  There is slight improvement in changes of interstitial edema with trace amount of pleural fluid.   2.  Pacemaker lead projects over the cardiac silhouette. There is no visible pneumothorax.      DX-CHEST-PORTABLE (1 VIEW)   Final Result         1.  Pulmonary edema and/or infiltrates are identified, which appear somewhat increased since the prior exam.   2.  Atherosclerosis      DX-CHEST-PORTABLE (1 VIEW)   Final Result      Mild worsening reticular opacification may indicate edema      New left pacer with no evidence of pneumothorax      EC-ECHOCARDIOGRAM COMPLETE W/O CONT   Final Result      DX-CHEST-PORTABLE (1 VIEW)   Final Result      No acute cardiac or pulmonary abnormality is noted.      CL-PERMANENT PACEMAKER INSERTION    (Results Pending)        Assessment/Plan  * Late onset Alzheimer's disease with behavioral disturbance- (present on admission)   Assessment & Plan    -Patient has severe underlying dementia  -Behavioral distrubance  -she is very [place] likely contributing to her delusions and behaviors  -Continue celexa and seroquel  -IM haldol as needed for increased agitation.  -Continue restraints to avoid PPM site wound contamination  -PRN haldol.  Patient currently still require restraint and unable to be transferred back to assisted living facility due to restraint.       Cellulitis   Assessment & Plan    -PPM site left chest. Tiger Text sent to PAUL Juarez, for EP Cardiology who will pass  along to team for someone to come evaluate  -completed 5 days of empiric ABX coverage today  -afebrile. No leukocytosis  Patient's left pacemaker pocket still not healed completely.  Still require dressing coverage.  Patient is unable to be off restraints due to above reason so unable to be transferred back to assisted living.     AV block- (present on admission)   Assessment & Plan    -s/p PPM placement 6/21  -HR 60's  -Fall Precautions  Patient's left pacemaker pocket still not healed completely.  Still require dressing coverage.  Patient is unable to be off restraints due to above reason.     Impaired skin integrity- (present on admission)   Assessment & Plan    -skin is very thin & frail with scattered skin tears, bruises  -nursing skin care protocol  -continue to encourage PO intake. Supplements.    Patient's left pacemaker pocket still not healed completely.  Still require dressing coverage.  Patient is unable to be off restraints due to above reason.     Pulmonary edema   Assessment & Plan    Increase in size of small bilateral pleural effusions on x-ray  BNP elevated.  Echo stable.  S/P 1 x dose of lasix.  Wean o2 as tolerated.  Intermittent chest x-rays      Generalized weakness- (present on admission)   Assessment & Plan    -PT/OT   -likely will need Home Health versus SNF placement         S/P placement of cardiac pacemaker   Assessment & Plan    -6/21/2019  -She has been afebrile without leukocytosis  -Area of concern medial to the PPM site on the sternum.  Baseball size area of questionable fluid or blood collection. X-ray showed no acute findings  -site appears more concerning for infection - see picture loaded to media section today. Started 5 day course of ABX  -wound culture negative.   -will likely need to remain in the hospital until pacemaker site is completely healed.     Patient's left pacemaker pocket still not healed completely.  Still require dressing coverage.  Patient is unable to be off  restraints due to above reason.     Normocytic anemia- (present on admission)   Assessment & Plan    -Iron, vitamin B12, and folate all WNL  -Follow labs     Syncopal episodes- (present on admission)   Assessment & Plan    -due to AV block  -now resolved after PPM placement         Stage 3 chronic kidney disease (HCC)- (present on admission)   Assessment & Plan    -chronic  -Avoid nephrotoxins  -Renally dose medications as indicated  -Follow labs       Hypothyroidism- (present on admission)   Assessment & Plan    -TSH low/free t4 elevated  -Levothyroxine decreased from 175 to 150 on 6/22  -TSH in 4-6 weeks            VTE prophylaxis: Heparin    Lizzy Carcamo M.D.       Current Facility-Administered Medications:   •  QUEtiapine (SEROQUEL) tablet 12.5 mg, 12.5 mg, Oral, BID, Juaquin Bailey, A.P.R.N., 12.5 mg at 07/11/19 0819  •  haloperidol lactate (HALDOL) injection 1 mg, 1 mg, Intramuscular, BID PRN, Lizzy Carcamo M.D., 1 mg at 07/11/19 0055  •  levothyroxine (SYNTHROID) tablet 150 mcg, 150 mcg, Oral, AM ES, Nichole Heller M.D., 150 mcg at 07/11/19 0821  •  aspirin (ASA) chewable tab 81 mg, 81 mg, Oral, DAILY, Leanne Poe M.D., 81 mg at 07/11/19 0819  •  citalopram (CELEXA) tablet 20 mg, 20 mg, Oral, DAILY, Leanne Poe M.D., 20 mg at 07/11/19 0819  •  traZODone (DESYREL) tablet 50 mg, 50 mg, Oral, HS PRN, Leanne Poe M.D., 50 mg at 07/10/19 2010  •  senna-docusate (PERICOLACE or SENOKOT S) 8.6-50 MG per tablet 2 Tab, 2 Tab, Oral, BID, 2 Tab at 07/11/19 0824 **AND** polyethylene glycol/lytes (MIRALAX) PACKET 1 Packet, 1 Packet, Oral, QDAY PRN **AND** magnesium hydroxide (MILK OF MAGNESIA) suspension 30 mL, 30 mL, Oral, QDAY PRN **AND** bisacodyl (DULCOLAX) suppository 10 mg, 10 mg, Rectal, QDAY PRN, Leanne Poe M.D.  •  Respiratory Care per Protocol, , Nebulization, Continuous RT, Leanne Poe M.D.  •  heparin injection 5,000 Units, 5,000 Units, Subcutaneous, Q8HRS, Leanne  Ilya Poe M.D., 5,000 Units at 07/11/19 1410  •  acetaminophen (TYLENOL) tablet 650 mg, 650 mg, Oral, Q6HRS PRN, Perdomobernadro Poe M.D., 650 mg at 07/10/19 0219

## 2019-07-12 PROCEDURE — A9270 NON-COVERED ITEM OR SERVICE: HCPCS | Performed by: INTERNAL MEDICINE

## 2019-07-12 PROCEDURE — 700102 HCHG RX REV CODE 250 W/ 637 OVERRIDE(OP): Performed by: INTERNAL MEDICINE

## 2019-07-12 PROCEDURE — 99232 SBSQ HOSP IP/OBS MODERATE 35: CPT | Performed by: INTERNAL MEDICINE

## 2019-07-12 PROCEDURE — 770006 HCHG ROOM/CARE - MED/SURG/GYN SEMI*

## 2019-07-12 PROCEDURE — 700102 HCHG RX REV CODE 250 W/ 637 OVERRIDE(OP): Performed by: NURSE PRACTITIONER

## 2019-07-12 PROCEDURE — A9270 NON-COVERED ITEM OR SERVICE: HCPCS | Performed by: NURSE PRACTITIONER

## 2019-07-12 PROCEDURE — 700111 HCHG RX REV CODE 636 W/ 250 OVERRIDE (IP): Performed by: INTERNAL MEDICINE

## 2019-07-12 RX ADMIN — QUETIAPINE FUMARATE 12.5 MG: 25 TABLET ORAL at 17:54

## 2019-07-12 RX ADMIN — LEVOTHYROXINE SODIUM 150 MCG: 75 TABLET ORAL at 10:02

## 2019-07-12 RX ADMIN — HEPARIN SODIUM 5000 UNITS: 5000 INJECTION, SOLUTION INTRAVENOUS; SUBCUTANEOUS at 14:35

## 2019-07-12 RX ADMIN — SENNOSIDES AND DOCUSATE SODIUM 2 TABLET: 8.6; 5 TABLET ORAL at 17:54

## 2019-07-12 RX ADMIN — SENNOSIDES AND DOCUSATE SODIUM 2 TABLET: 8.6; 5 TABLET ORAL at 10:01

## 2019-07-12 RX ADMIN — ASPIRIN 81 MG 81 MG: 81 TABLET ORAL at 10:03

## 2019-07-12 RX ADMIN — HEPARIN SODIUM 5000 UNITS: 5000 INJECTION, SOLUTION INTRAVENOUS; SUBCUTANEOUS at 10:03

## 2019-07-12 RX ADMIN — CITALOPRAM HYDROBROMIDE 20 MG: 20 TABLET ORAL at 10:03

## 2019-07-12 RX ADMIN — QUETIAPINE FUMARATE 12.5 MG: 25 TABLET ORAL at 10:02

## 2019-07-12 NOTE — PROGRESS NOTES
Hospital Medicine Daily Progress Note    Date of Service  7/12/2019    Chief Complaint  Bradycardia and syncope.    Hospital Course   This is a 89-year-old female with PMH significant for CKD 3, hypothyroidism, dementia and [place] who transferred here from outside facility for AV block thought to be Mobitz 2.  She underwent PPM placement on 6/21/2019 by cardiology.  Her post procedure course was complicated by ongoing confusion and attempts to pick at her PPM site incision requiring restraints.      Interval Problem Update  7/10. Patient still very confused.  Still require restraint.  Patient's left chest pacemaker pocket incision still not healed completely still require dressing coverage.  -continues to require restraints to keep her from picking at wound.  Unable to finish review of system due to dementia  7/11.  Patient still require restraint.  Denies significant overnight acute event.  Overall pleasant.  Complains of general body achiness. Patient's pain is general, 2-3/10, intermittent and does not radiate to other location, sharp and with some tingling. Can be controlled by pain meds. Dressing in place.  7/12.  Patient still require restraint.  Tolerated other treatment.  Pleasant.  Still confused.  Left chest pocket still not healed yet. Patient otherwise denies fever, chills, nausea, vomiting, adb pain, SOB, CP, headache, constipation, diarrhea, cough, or sputum.    Consultants/Specialty  -Cardiology -reconsulted to evaluated pacemaker pocket incision.    Code Status  DNAR/DNI    Disposition  Her currently Senior Living Facility is not comfortable taking her back until her PPM site has healed. PT/OT recommend HH - referral placed    Review of Systems  Review of Systems   Unable to perform ROS: Dementia (and VERY [place])        Physical Exam  Temp:  [36.2 °C (97.1 °F)-36.7 °C (98 °F)] 36.3 °C (97.4 °F)  Pulse:  [60-62] 62  Resp:  [14-17] 14  BP: (131-159)/(50-62) 145/62  SpO2:  [94 %-97 %] 94 %    Physical Exam    Constitutional: Vital signs are normal. She appears well-developed. She is sleeping and uncooperative. She is easily aroused.   Frail, hard of hearing.    HENT:   Head: Atraumatic.   Right Ear: Decreased hearing is noted.   Left Ear: Decreased hearing is noted.   Nose: Nose normal.   Mouth/Throat: Oropharynx is clear and moist.   VERY [place]   Eyes: Pupils are equal, round, and reactive to light. Conjunctivae and EOM are normal. No scleral icterus.   Neck: Normal range of motion. Neck supple. No thyromegaly present.   Cardiovascular: Normal rate and intact distal pulses.  Exam reveals no gallop.    Murmur heard.   Systolic murmur is present with a grade of 3/6   PPM site with dressing CDI without drainage and without erythema   Pulmonary/Chest: Effort normal. She has decreased breath sounds. She has no wheezes. She has no rhonchi. She has no rales. She exhibits no tenderness.   Left chest pacemaker pocket still not healing, no signs of infection or purulent secretion   Abdominal: Soft. Bowel sounds are normal. She exhibits no distension and no mass. There is no tenderness. There is no rebound.   Musculoskeletal: Normal range of motion. She exhibits no tenderness.   Generalized weakness   Lymphadenopathy:     She has no cervical adenopathy.   Neurological: She is easily aroused. She is disoriented. She displays atrophy. No cranial nerve deficit. She exhibits abnormal muscle tone.   Skin: Skin is warm. No rash noted. She is not diaphoretic.   Left chest wound pocket no signs of infection but not healed   Psychiatric: She has a normal mood and affect. Her behavior is normal. Judgment normal. Cognition and memory are impaired. She exhibits abnormal recent memory and abnormal remote memory.       Fluids    Intake/Output Summary (Last 24 hours) at 07/12/19 1511  Last data filed at 07/11/19 2050   Gross per 24 hour   Intake              250 ml   Output                0 ml   Net              250 ml       Laboratory                         Imaging  DX-CHEST-PORTABLE (1 VIEW)   Final Result      1.  Blunting of the costophrenic angles likely represent small pleural effusions. Adjacent airspace opacification may be secondary to atelectasis or pneumonia.      2.  Increased perihilar interstitial markings are suggestive of mild edema.      DX-CHEST-PORTABLE (1 VIEW)   Final Result      1.  Increase in size of small bilateral pleural effusions.      2.  Unchanged cardiomegaly.      3.  Persistent diffuse interstitial opacity which represent interstitial edema, pneumonia, or fibrosis.      DX-CHEST-PORTABLE (1 VIEW)   Final Result      1.  Mild bibasilar atelectasis/scarring and trace right effusion.   2.  Unipolar pacer lead projects appropriately.      DX-CHEST-LIMITED (1 VIEW)   Final Result      1.  There is slight improvement in changes of interstitial edema with trace amount of pleural fluid.   2.  Pacemaker lead projects over the cardiac silhouette. There is no visible pneumothorax.      DX-CHEST-PORTABLE (1 VIEW)   Final Result         1.  Pulmonary edema and/or infiltrates are identified, which appear somewhat increased since the prior exam.   2.  Atherosclerosis      DX-CHEST-PORTABLE (1 VIEW)   Final Result      Mild worsening reticular opacification may indicate edema      New left pacer with no evidence of pneumothorax      EC-ECHOCARDIOGRAM COMPLETE W/O CONT   Final Result      DX-CHEST-PORTABLE (1 VIEW)   Final Result      No acute cardiac or pulmonary abnormality is noted.      CL-PERMANENT PACEMAKER INSERTION    (Results Pending)        Assessment/Plan  * Late onset Alzheimer's disease with behavioral disturbance- (present on admission)   Assessment & Plan    -Patient has severe underlying dementia  -Behavioral distrubance  -she is very [place] likely contributing to her delusions and behaviors  -Continue celexa and seroquel  -IM haldol as needed for increased agitation.  -Continue restraints to avoid PPM site wound  contamination  -PRN haldol.  Patient currently still require restraint and unable to be transferred back to assisted living facility due to restraint.  Needs to continue restraint to protect left chest pacemaker pocket to prevent damage or infection because patient frequently picking the pocket skin.       Cellulitis   Assessment & Plan    -PPM site left chest. Tiger Text sent to PAUL Juarez, for EP Cardiology who will pass along to team for someone to come evaluate  -completed 5 days of empiric ABX coverage today  -afebrile. No leukocytosis  Patient's left pacemaker pocket still not healed completely.  Still require dressing coverage.  Patient is unable to be off restraints due to above reason so unable to be transferred back to assisted living.  Needs to continue restraint to protect left chest pacemaker pocket to prevent damage or infection because patient frequently picking the pocket skin.       AV block- (present on admission)   Assessment & Plan    -s/p PPM placement 6/21  -HR 60's  -Fall Precautions  Patient's left pacemaker pocket still not healed completely.  Still require dressing coverage.  Patient is unable to be off restraints due to above reason.  Needs to continue restraint to protect left chest pacemaker pocket to prevent damage or infection because patient frequently picking the pocket skin.       Impaired skin integrity- (present on admission)   Assessment & Plan    -skin is very thin & frail with scattered skin tears, bruises  -nursing skin care protocol  -continue to encourage PO intake. Supplements.    Patient's left pacemaker pocket still not healed completely.  Still require dressing coverage.  Patient is unable to be off restraints due to above reason.  Needs to continue restraint to protect left chest pacemaker pocket to prevent damage or infection because patient frequently picking the pocket skin.       Pulmonary edema   Assessment & Plan    Increase in size of small bilateral  pleural effusions on x-ray  BNP elevated.  Echo stable.  S/P 1 x dose of lasix.  Wean o2 as tolerated.  Intermittent chest x-rays      Generalized weakness- (present on admission)   Assessment & Plan    -PT/OT   -likely will need Home Health versus SNF placement         S/P placement of cardiac pacemaker   Assessment & Plan    -6/21/2019  -She has been afebrile without leukocytosis  -Area of concern medial to the PPM site on the sternum.  Baseball size area of questionable fluid or blood collection. X-ray showed no acute findings  -site appears more concerning for infection - see picture loaded to media section today. Started 5 day course of ABX  -wound culture negative.   -will likely need to remain in the hospital until pacemaker site is completely healed.     Patient's left pacemaker pocket still not healed completely.  Still require dressing coverage.  Patient is unable to be off restraints due to above reason.     Normocytic anemia- (present on admission)   Assessment & Plan    -Iron, vitamin B12, and folate all WNL  -Follow labs     Syncopal episodes- (present on admission)   Assessment & Plan    -due to AV block  -now resolved after PPM placement         Stage 3 chronic kidney disease (HCC)- (present on admission)   Assessment & Plan    -chronic  -Avoid nephrotoxins  -Renally dose medications as indicated  -Follow labs       Hypothyroidism- (present on admission)   Assessment & Plan    -TSH low/free t4 elevated  -Levothyroxine decreased from 175 to 150 on 6/22  -TSH in 4-6 weeks            VTE prophylaxis: Heparin    Lizzy Carcamo M.D.       Current Facility-Administered Medications:   •  QUEtiapine (SEROQUEL) tablet 12.5 mg, 12.5 mg, Oral, BID, Juaquin Bailey A.P.R.N., 12.5 mg at 07/12/19 1002  •  haloperidol lactate (HALDOL) injection 1 mg, 1 mg, Intramuscular, BID PRN, Lizzy Carcamo M.D., 1 mg at 07/11/19 0055  •  levothyroxine (SYNTHROID) tablet 150 mcg, 150 mcg, Oral, AM ES, Nichole Heller M.D., 150 mcg at 07/12/19  1002  •  aspirin (ASA) chewable tab 81 mg, 81 mg, Oral, DAILY, Leanne Poe M.D., 81 mg at 07/12/19 1003  •  citalopram (CELEXA) tablet 20 mg, 20 mg, Oral, DAILY, Leanne Poe M.D., 20 mg at 07/12/19 1003  •  traZODone (DESYREL) tablet 50 mg, 50 mg, Oral, HS PRN, Leanne Poe M.D., 50 mg at 07/11/19 2012  •  senna-docusate (PERICOLACE or SENOKOT S) 8.6-50 MG per tablet 2 Tab, 2 Tab, Oral, BID, 2 Tab at 07/12/19 1001 **AND** polyethylene glycol/lytes (MIRALAX) PACKET 1 Packet, 1 Packet, Oral, QDAY PRN **AND** magnesium hydroxide (MILK OF MAGNESIA) suspension 30 mL, 30 mL, Oral, QDAY PRN **AND** bisacodyl (DULCOLAX) suppository 10 mg, 10 mg, Rectal, QDAY PRN, Leanne Poe M.D.  •  Respiratory Care per Protocol, , Nebulization, Continuous RT, Leanne Poe M.D.  •  heparin injection 5,000 Units, 5,000 Units, Subcutaneous, Q8HRS, Leanne Poe M.D., 5,000 Units at 07/12/19 1435  •  acetaminophen (TYLENOL) tablet 650 mg, 650 mg, Oral, Q6HRS PRN, Leanne Poe M.D., 650 mg at 07/10/19 7189

## 2019-07-12 NOTE — PROGRESS NOTES
Assumed care of Pt at shift change. Pt is A&Ox1. Pt denies pain. Call light with in reach. Traction socks on pt and bed in lowest position. RN in the room with the pt for about 10 min. Whiteboard used for communication and all of the pt needs have been meet at this time.

## 2019-07-12 NOTE — CARE PLAN
Problem: Nutritional:  Goal: Achieve adequate nutritional intake  Patient will consume 25-50% of meals   Outcome: PROGRESSING SLOWER THAN EXPECTED

## 2019-07-12 NOTE — DIETARY
Nutrition Services Update: following for adequate nutritional intake    Day 22 of admit. Regular/Dysphagia 2/Thin Liquid diet. ADL documentation shows intake has been variable, < 25 - 100%.     Variable intake appears to be pt's baseline, despite encouragement and nutrition supplements. Weight has been trending down since admit.   Admit weight (6/20) per bed scale = 61.1 kg.  Most recent weight (7/9) per bed scale = 58.5 kg.   2.6 kg (4%) loss in ~3 weeks is significant weight loss.     Pt remains A&O x 1 only to self, on wrist restraints. Unable to provide pertinent details regarding food preferences/comments on supplements. Further nutrition interventions are limited as pt with altered mental status is very likely to pull on a feeding tube.     Plan/Recommend:   1. Will continue to optimize nutrition as able with supplements  · Boost Plus BID and greek yogurt TID in place  2. Encourage PO intake of meals and supplements   3. Please continue to record intake as % meals consumed in ADLs  4. RD will continue to monitor PO intake and weight trend    RD following

## 2019-07-12 NOTE — CARE PLAN
Problem: Safety  Goal: Will remain free from falls    Intervention: Assess risk factors for falls  Pt is a high fall risk and does not receive education well.       Problem: Infection  Goal: Will remain free from infection    Intervention: Implement standard precautions and perform hand washing before and after patient contact  Standard precautions implemented during pt contact.

## 2019-07-12 NOTE — CARE PLAN
Problem: Safety  Goal: Will remain free from falls  Outcome: PROGRESSING AS EXPECTED  Reinforced the use of call light when needing assistance. Treaded socks on. Bed on lowest position. Personal belongings within reach. Clutter free environment provided.     Problem: Skin Integrity  Goal: Risk for impaired skin integrity will decrease  Outcome: PROGRESSING AS EXPECTED  Turned to sides every 2 hours. Reinforced dressing. Incontinence care done.

## 2019-07-13 PROCEDURE — 700111 HCHG RX REV CODE 636 W/ 250 OVERRIDE (IP): Performed by: INTERNAL MEDICINE

## 2019-07-13 PROCEDURE — A9270 NON-COVERED ITEM OR SERVICE: HCPCS | Performed by: INTERNAL MEDICINE

## 2019-07-13 PROCEDURE — 700102 HCHG RX REV CODE 250 W/ 637 OVERRIDE(OP): Performed by: NURSE PRACTITIONER

## 2019-07-13 PROCEDURE — 770006 HCHG ROOM/CARE - MED/SURG/GYN SEMI*

## 2019-07-13 PROCEDURE — 700102 HCHG RX REV CODE 250 W/ 637 OVERRIDE(OP): Performed by: INTERNAL MEDICINE

## 2019-07-13 PROCEDURE — 99232 SBSQ HOSP IP/OBS MODERATE 35: CPT | Performed by: INTERNAL MEDICINE

## 2019-07-13 PROCEDURE — A9270 NON-COVERED ITEM OR SERVICE: HCPCS | Performed by: NURSE PRACTITIONER

## 2019-07-13 RX ADMIN — SENNOSIDES AND DOCUSATE SODIUM 2 TABLET: 8.6; 5 TABLET ORAL at 17:40

## 2019-07-13 RX ADMIN — QUETIAPINE FUMARATE 12.5 MG: 25 TABLET ORAL at 17:40

## 2019-07-13 RX ADMIN — LEVOTHYROXINE SODIUM 150 MCG: 75 TABLET ORAL at 05:14

## 2019-07-13 RX ADMIN — QUETIAPINE FUMARATE 12.5 MG: 25 TABLET ORAL at 05:14

## 2019-07-13 RX ADMIN — ASPIRIN 81 MG 81 MG: 81 TABLET ORAL at 05:14

## 2019-07-13 RX ADMIN — TRAZODONE HYDROCHLORIDE 50 MG: 50 TABLET ORAL at 20:47

## 2019-07-13 RX ADMIN — CITALOPRAM HYDROBROMIDE 20 MG: 20 TABLET ORAL at 05:14

## 2019-07-13 RX ADMIN — HEPARIN SODIUM 5000 UNITS: 5000 INJECTION, SOLUTION INTRAVENOUS; SUBCUTANEOUS at 14:00

## 2019-07-13 ASSESSMENT — PAIN SCALES - PAIN ASSESSMENT IN ADVANCED DEMENTIA (PAINAD)
TOTALSCORE: 0
FACIALEXPRESSION: SMILING OR INEXPRESSIVE
CONSOLABILITY: NO NEED TO CONSOLE
BODYLANGUAGE: RELAXED
BREATHING: NORMAL

## 2019-07-13 NOTE — PROGRESS NOTES
2 RN skin check completed with MARIBELL Rangel.   Devices in place nasal cannula, bilateral soft non-violent wrist restraints.  Skin assessed under devices yes.  Confirmed pressure ulcers found on none.  New potential pressure ulcers noted on none. Wound consult placed n/a.     Skin assessment: heels pink/red/blanching. Elbows red/blanching. Scab mid upper back. Generalized bruising scabbing. BUE each have large skin tears with mepalite in place. Sacrum is pink/red and blanching. Back of head is pink/slow to paty.      The following interventions in place waffle overlay mattress, q 2 hr turns, gray foam ear pads, pillows in use for support and positioning, dressing over pacemaker changed, dressings over wounds/skin tears. Incontinence monitoring. Bed linen changed as needed.

## 2019-07-13 NOTE — PROGRESS NOTES
Received bedside report and accepted care of pt. Pt currently resting in bed in no visible or stated signs of distress. Pt had dressing off pacemaker incision, wound care completed and dressing was applied to area. Pt is on R and L bilateral non-violent soft wrist restraints due to pulling off dressings. Pt is checked frequently for circulation.  Pt is frequently offered food/snack and drinks. Q 2 turns in place. Bed alarm in place, controls on and bed in locked and lowest position. Call light and personal possessions within reach. Patient educated about use of call light and verbalized understanding.

## 2019-07-13 NOTE — CARE PLAN
Problem: Safety  Goal: Will remain free from falls  Outcome: PROGRESSING AS EXPECTED  Reinforced the use of call light when needing assistance. Treaded socks on. Bed on lowest position. Personal belongings within reach. Clutter free environment provided.     Problem: Skin Integrity  Goal: Risk for impaired skin integrity will decrease  Outcome: PROGRESSING AS EXPECTED  Assisted to turn to sides every 2 hours. Incontinence care done.

## 2019-07-13 NOTE — PROGRESS NOTES
Hospital Medicine Daily Progress Note    Date of Service  7/13/2019    Chief Complaint  Bradycardia and syncope.    Hospital Course   This is a 89-year-old female with PMH significant for CKD 3, hypothyroidism, dementia and [place] who transferred here from outside facility for AV block thought to be [place] 2.  She underwent PPM placement on 6/21/2019 by cardiology.  Her post procedure course was complicated by ongoing confusion and attempts to pick at her PPM site incision requiring restraints.      Interval Problem Update  7/10. Patient still very confused.  Still require restraint.  Patient's left chest pacemaker pocket incision still not healed completely still require dressing coverage.  -continues to require restraints to keep her from picking at wound.  Unable to finish review of system due to dementia  7/11.  Patient still require restraint.  Denies significant overnight acute event.  Overall pleasant.  Complains of general body achiness. Patient's pain is general, 2-3/10, intermittent and does not radiate to other location, sharp and with some tingling. Can be controlled by pain meds. Dressing in place.  7/12.  Patient still require restraint.  Tolerated other treatment.  Pleasant.  Still confused.  Left chest pocket still not healed yet. Patient otherwise denies fever, chills, nausea, vomiting, adb pain, SOB, CP, headache, constipation, diarrhea, cough, or sputum.  7/13. Patient overall remained stable.  In order to protect skin incision still need to be on restrain.  Patient has no signs of fever, chills, nausea vomiting diarrhea.  Complains of general fatigue.  Patient also continued to showing signs of picking her wound.    Consultants/Specialty  -Cardiology -reconsulted to evaluated pacemaker pocket incision.    Code Status  DNAR/DNI    Disposition  Her currently [facility] Living Facility is not comfortable taking her back until her PPM site has healed. PT/OT recommend HH - referral placed    Review of  Systems  Review of Systems   Unable to perform ROS: Dementia (and VERY [place])        Physical Exam  Temp:  [36.6 °C (97.8 °F)-37.7 °C (99.8 °F)] 37.7 °C (99.8 °F)  Pulse:  [60-63] 60  Resp:  [16-20] 20  BP: (139)/(54-70) 139/70  SpO2:  [94 %-98 %] 98 %    Physical Exam   Constitutional: Vital signs are normal. She appears well-nourished. She is sleeping and uncooperative. She is easily aroused. No distress.   Frail, hard of hearing.    HENT:   Head: Atraumatic.   Right Ear: External ear normal. Decreased hearing is noted.   Left Ear: External ear normal. Decreased hearing is noted.   Nose: Nose normal.   Mouth/Throat: Oropharynx is clear and moist.   VERY [place]   Eyes: Pupils are equal, round, and reactive to light. Conjunctivae and EOM are normal. No scleral icterus.   Neck: Normal range of motion. Neck supple. No thyromegaly present.   Cardiovascular: Normal rate and intact distal pulses.  Exam reveals no gallop and no friction rub.    Murmur heard.   Systolic murmur is present with a grade of 3/6   PPM site with dressing CDI without drainage and without erythema   Pulmonary/Chest: Effort normal. She has decreased breath sounds. She has no wheezes. She has no rhonchi. She has no rales. She exhibits no tenderness.   Left chest pacemaker pocket still not healing, no signs of infection or purulent secretion   Abdominal: Soft. Bowel sounds are normal. She exhibits no mass. There is no tenderness. There is no rebound.   Musculoskeletal: Normal range of motion. She exhibits no tenderness.   Generalized weakness   Lymphadenopathy:     She has no cervical adenopathy.   Neurological: She is easily aroused. She is disoriented. She displays atrophy. No cranial nerve deficit. She exhibits abnormal muscle tone.   Skin: Skin is warm. No rash noted. No erythema.   Left chest wound pocket no signs of infection but not healed   Psychiatric: She has a normal mood and affect. Her behavior is normal. Judgment normal. Cognition and memory  are impaired. She exhibits abnormal recent memory and abnormal remote memory.       Fluids    Intake/Output Summary (Last 24 hours) at 07/13/19 1333  Last data filed at 07/13/19 0015   Gross per 24 hour   Intake              390 ml   Output                0 ml   Net              390 ml       Laboratory                        Imaging  DX-CHEST-PORTABLE (1 VIEW)   Final Result      1.  Blunting of the costophrenic angles likely represent small pleural effusions. Adjacent airspace opacification may be secondary to atelectasis or pneumonia.      2.  Increased perihilar interstitial markings are suggestive of mild edema.      DX-CHEST-PORTABLE (1 VIEW)   Final Result      1.  Increase in size of small bilateral pleural effusions.      2.  Unchanged cardiomegaly.      3.  Persistent diffuse interstitial opacity which represent interstitial edema, pneumonia, or fibrosis.      DX-CHEST-PORTABLE (1 VIEW)   Final Result      1.  Mild bibasilar atelectasis/scarring and trace right effusion.   2.  Unipolar pacer lead projects appropriately.      DX-CHEST-LIMITED (1 VIEW)   Final Result      1.  There is slight improvement in changes of interstitial edema with trace amount of pleural fluid.   2.  Pacemaker lead projects over the cardiac silhouette. There is no visible pneumothorax.      DX-CHEST-PORTABLE (1 VIEW)   Final Result         1.  Pulmonary edema and/or infiltrates are identified, which appear somewhat increased since the prior exam.   2.  Atherosclerosis      DX-CHEST-PORTABLE (1 VIEW)   Final Result      Mild worsening reticular opacification may indicate edema      New left pacer with no evidence of pneumothorax      EC-ECHOCARDIOGRAM COMPLETE W/O CONT   Final Result      DX-CHEST-PORTABLE (1 VIEW)   Final Result      No acute cardiac or pulmonary abnormality is noted.      CL-PERMANENT PACEMAKER INSERTION    (Results Pending)        Assessment/Plan  * Late onset Alzheimer's disease with behavioral disturbance-  (present on admission)   Assessment & Plan    -Patient has severe underlying dementia  -Behavioral distrubance  -she is very [place] likely contributing to her delusions and behaviors  -Continue celexa and seroquel  -IM haldol as needed for increased agitation.  -Continue restraints to avoid PPM site wound contamination  -PRN haldol.  Patient currently still require restraint and unable to be transferred back to assisted living facility due to restraint.  Needs to continue restraint to protect left chest pacemaker pocket to prevent damage or infection because patient frequently picking the pocket skin.  I continued patient restrain.     Cellulitis   Assessment & Plan    Resolved after abx  -PPM site left chest. Tiger Text sent to PAUL Juarez, for EP Cardiology who will pass along to team for someone to come evaluate  -completed 5 days of empiric ABX coverage today  -afebrile. No leukocytosis  Patient's left pacemaker pocket still not healed completely.  Still require dressing coverage.  Patient is unable to be off restraints due to above reason so unable to be transferred back to assisted living.  Needs to continue restraint to protect left chest pacemaker pocket to prevent damage or infection because patient frequently picking the pocket skin.       AV block- (present on admission)   Assessment & Plan    -s/p PPM placement 6/21  -HR 60's  -Fall Precautions  Patient's left pacemaker pocket still not healed completely.  Still require dressing coverage.  Patient is unable to be off restraints due to above reason.  Needs to continue restraint to protect left chest pacemaker pocket to prevent damage or infection because patient frequently picking the pocket skin.       Impaired skin integrity- (present on admission)   Assessment & Plan    -skin is very thin & frail with scattered skin tears, bruises  -nursing skin care protocol  -continue to encourage PO intake. Supplements.    Patient's left pacemaker pocket still  not healed completely.  Still require dressing coverage.  Patient is unable to be off restraints due to above reason.  Needs to continue restraint to protect left chest pacemaker pocket to prevent damage or infection because patient frequently picking the pocket skin.  I continued patient restrain.     Pulmonary edema   Assessment & Plan    Increase in size of small bilateral pleural effusions on x-ray  BNP elevated.  Echo stable.  S/P 1 x dose of lasix.  Wean o2 as tolerated.  Intermittent chest x-rays      Generalized weakness- (present on admission)   Assessment & Plan    -PT/OT   -likely will need Home Health versus SNF placement         S/P placement of cardiac pacemaker   Assessment & Plan    -6/21/2019  -She has been afebrile without leukocytosis  -Area of concern medial to the PPM site on the sternum.  Baseball size area of questionable fluid or blood collection. X-ray showed no acute findings  -site appears more concerning for infection - see picture loaded to media section today. Started 5 day course of ABX  -wound culture negative.   -will likely need to remain in the hospital until pacemaker site is completely healed.     Patient's left pacemaker pocket still not healed completely.  Still require dressing coverage.  Patient is unable to be off restraints due to above reason.  I continued patient restrain.     Normocytic anemia- (present on admission)   Assessment & Plan    -Iron, vitamin B12, and folate all WNL  -Follow labs     Syncopal episodes- (present on admission)   Assessment & Plan    -due to AV block  -now resolved after PPM placement         Stage 3 chronic kidney disease (HCC)- (present on admission)   Assessment & Plan    -chronic  -Avoid nephrotoxins  -Renally dose medications as indicated  -Follow labs       Hypothyroidism- (present on admission)   Assessment & Plan    -TSH low/free t4 elevated  -Levothyroxine decreased from 175 to 150 on 6/22  -TSH in 4-6 weeks            VTE prophylaxis:  Heparin    Lizzy Carcamo M.D.       Current Facility-Administered Medications:   •  QUEtiapine (SEROQUEL) tablet 12.5 mg, 12.5 mg, Oral, BID, DEMARCUS Manzanares.P.RMANAN, 12.5 mg at 07/13/19 0514  •  haloperidol lactate (HALDOL) injection 1 mg, 1 mg, Intramuscular, BID PRN, Lizzy Carcamo M.D., 1 mg at 07/11/19 0055  •  levothyroxine (SYNTHROID) tablet 150 mcg, 150 mcg, Oral, AM ES, Nichole Heller M.D., 150 mcg at 07/13/19 0514  •  aspirin (ASA) chewable tab 81 mg, 81 mg, Oral, DAILY, Leanen Poe M.D., 81 mg at 07/13/19 0514  •  citalopram (CELEXA) tablet 20 mg, 20 mg, Oral, DAILY, Leanne Poe M.D., 20 mg at 07/13/19 0514  •  traZODone (DESYREL) tablet 50 mg, 50 mg, Oral, HS PRN, Leanne Poe M.D., 50 mg at 07/11/19 2012  •  senna-docusate (PERICOLACE or SENOKOT S) 8.6-50 MG per tablet 2 Tab, 2 Tab, Oral, BID, 2 Tab at 07/12/19 1754 **AND** polyethylene glycol/lytes (MIRALAX) PACKET 1 Packet, 1 Packet, Oral, QDAY PRN **AND** magnesium hydroxide (MILK OF MAGNESIA) suspension 30 mL, 30 mL, Oral, QDAY PRN **AND** bisacodyl (DULCOLAX) suppository 10 mg, 10 mg, Rectal, QDAY PRN, Leanne Poe M.D.  •  Respiratory Care per Protocol, , Nebulization, Continuous RT, Leanne Poe M.D.  •  heparin injection 5,000 Units, 5,000 Units, Subcutaneous, Q8HRS, Leanne Poe M.D., Stopped at 07/12/19 2200  •  acetaminophen (TYLENOL) tablet 650 mg, 650 mg, Oral, Q6HRS PRN, Leanne Poe M.D., 650 mg at 07/10/19 8507

## 2019-07-14 LAB — EKG IMPRESSION: NORMAL

## 2019-07-14 PROCEDURE — 700111 HCHG RX REV CODE 636 W/ 250 OVERRIDE (IP): Performed by: INTERNAL MEDICINE

## 2019-07-14 PROCEDURE — 93010 ELECTROCARDIOGRAM REPORT: CPT | Performed by: INTERNAL MEDICINE

## 2019-07-14 PROCEDURE — 770006 HCHG ROOM/CARE - MED/SURG/GYN SEMI*

## 2019-07-14 PROCEDURE — 700102 HCHG RX REV CODE 250 W/ 637 OVERRIDE(OP): Performed by: INTERNAL MEDICINE

## 2019-07-14 PROCEDURE — 700102 HCHG RX REV CODE 250 W/ 637 OVERRIDE(OP): Performed by: NURSE PRACTITIONER

## 2019-07-14 PROCEDURE — A9270 NON-COVERED ITEM OR SERVICE: HCPCS | Performed by: INTERNAL MEDICINE

## 2019-07-14 PROCEDURE — 93005 ELECTROCARDIOGRAM TRACING: CPT | Performed by: INTERNAL MEDICINE

## 2019-07-14 PROCEDURE — 99232 SBSQ HOSP IP/OBS MODERATE 35: CPT | Performed by: INTERNAL MEDICINE

## 2019-07-14 PROCEDURE — A9270 NON-COVERED ITEM OR SERVICE: HCPCS | Performed by: NURSE PRACTITIONER

## 2019-07-14 RX ADMIN — HEPARIN SODIUM 5000 UNITS: 5000 INJECTION, SOLUTION INTRAVENOUS; SUBCUTANEOUS at 20:54

## 2019-07-14 RX ADMIN — ASPIRIN 81 MG 81 MG: 81 TABLET ORAL at 04:38

## 2019-07-14 RX ADMIN — HEPARIN SODIUM 5000 UNITS: 5000 INJECTION, SOLUTION INTRAVENOUS; SUBCUTANEOUS at 13:32

## 2019-07-14 RX ADMIN — HEPARIN SODIUM 5000 UNITS: 5000 INJECTION, SOLUTION INTRAVENOUS; SUBCUTANEOUS at 04:37

## 2019-07-14 RX ADMIN — QUETIAPINE FUMARATE 12.5 MG: 25 TABLET ORAL at 17:42

## 2019-07-14 RX ADMIN — LEVOTHYROXINE SODIUM 150 MCG: 75 TABLET ORAL at 04:37

## 2019-07-14 RX ADMIN — QUETIAPINE FUMARATE 12.5 MG: 25 TABLET ORAL at 04:37

## 2019-07-14 RX ADMIN — CITALOPRAM HYDROBROMIDE 20 MG: 20 TABLET ORAL at 04:38

## 2019-07-14 RX ADMIN — SENNOSIDES AND DOCUSATE SODIUM 2 TABLET: 8.6; 5 TABLET ORAL at 04:37

## 2019-07-14 RX ADMIN — SENNOSIDES AND DOCUSATE SODIUM 2 TABLET: 8.6; 5 TABLET ORAL at 17:41

## 2019-07-14 NOTE — CARE PLAN
Problem: Skin Integrity  Goal: Risk for impaired skin integrity will decrease  Outcome: PROGRESSING AS EXPECTED  Pt turned and positioned every two hours. Incontinence care provided and barrier paste applied as needed.     Problem: Psychosocial Needs:  Goal: Level of anxiety will decrease  Outcome: PROGRESSING AS EXPECTED  Pt is encouraged to voice feelings, therapeutic communication was utilized.

## 2019-07-14 NOTE — PROGRESS NOTES
Bedside report received. Pt is A&Ox1 to self only. Pt has bilateral soft wrist restraints in place for pulling off dressings. Pt turned and positioned every two hours. Incontinence care provided and barrier paste applied as needed.Fall precautions in place. Bed in lowest position.  Mobility sign on door. Bed-alarm on. Call light within reach.

## 2019-07-14 NOTE — CARE PLAN
Problem: Safety  Goal: Will remain free from falls  Outcome: PROGRESSING AS EXPECTED  Reinforced the use of call light when needing assistance. Treaded socks on. Bed on lowest position. Personal belongings within reach. Clutter free environment provided.     Problem: Skin Integrity  Goal: Risk for impaired skin integrity will decrease  Outcome: PROGRESSING AS EXPECTED  Educated not to touch dressing. Assisted to turns to side every 2 hours. Incontinence care done

## 2019-07-14 NOTE — PROGRESS NOTES
Pt alert and oriented x1, only to self. Dinner tray offered. Consumed 25 to 50% of the meal. Incontinence care done. Safety precautions in placed. Bed in lowest position. Upper side rails up. Treaded socks on. Bed in lowest position.

## 2019-07-14 NOTE — PROGRESS NOTES
2 RN skin check completed with MARIBELL Jones.   Devices in place nasal cannula, bilateral soft non-violent wrist restraints.  Skin assessed under devices yes.  Confirmed pressure ulcers found on none.  New potential pressure ulcers noted on none. Wound consult placed n/a.     Skin assessment: heels pink/red/blanching. Elbows red/blanching. Scab mid upper back. Generalized bruising scabbing. BUE each have large skin tears with mepalite in place. Sacrum is pink/red and blanching. Back of head is pink/slow to paty.      The following interventions in place waffle overlay mattress, q 2 hr turns, bilateral wrist restraints removed for a period of time,  gray foam ear pads, pillows in use for support and positioning, dressing over pacemaker changed, dressings over wounds/skin tears were changed. Pt took a shower. Incontinence monitoring. Bed linen changed as needed.

## 2019-07-14 NOTE — PROGRESS NOTES
Hospital Medicine Daily Progress Note    Date of Service  7/14/2019    Chief Complaint  Bradycardia and syncope.    Hospital Course   This is a 89-year-old female with PMH significant for CKD 3, hypothyroidism, dementia and [place] who transferred here from outside facility for AV block thought to be Mobitz 2.  She underwent PPM placement on 6/21/2019 by cardiology.  Her post procedure course was complicated by ongoing confusion and attempts to pick at her PPM site incision requiring restraints.      Interval Problem Update  7/10. Patient still very confused.  Still require restraint.  Patient's left chest pacemaker pocket incision still not healed completely still require dressing coverage.  -continues to require restraints to keep her from picking at wound.  Unable to finish review of system due to dementia  7/11.  Patient still require restraint.  Denies significant overnight acute event.  Overall pleasant.  Complains of general body achiness. Patient's pain is general, 2-3/10, intermittent and does not radiate to other location, sharp and with some tingling. Can be controlled by pain meds. Dressing in place.  7/12.  Patient still require restraint.  Tolerated other treatment.  Pleasant.  Still confused.  Left chest pocket still not healed yet. Patient otherwise denies fever, chills, nausea, vomiting, adb pain, SOB, CP, headache, constipation, diarrhea, cough, or sputum.  7/13. Patient overall remained stable.  In order to protect skin incision still need to be on restrain.  Patient has no signs of fever, chills, nausea vomiting diarrhea.  Complains of general fatigue.  Patient also continued to showing signs of picking her wound.  7/14.  Patient overall is calm.  Patient still require restraint most of the time but today able to sit up in chair and out of the room assisted by medical staff without restraint with good response.  Patient still needs to protect her skin integrity by being applied with restraint.   Patient complains of general body achiness. Patient's pain is general 2-3/10, intermittent and does not radiate to other location, sharp and with some tingling. Can be controlled by pain meds. Dressing in place.      Consultants/Specialty  -Cardiology -reconsulted to evaluated pacemaker pocket incision.    Code Status  DNAR/DNI    Disposition  Her currently Senior Living Facility is not comfortable taking her back until her PPM site has healed. PT/OT recommend HH - referral placed    Review of Systems  Review of Systems   Unable to perform ROS: Dementia (and VERY [place])        Physical Exam  Temp:  [36.3 °C (97.4 °F)-37.2 °C (99 °F)] 37.2 °C (99 °F)  Pulse:  [60-65] 60  Resp:  [13-18] 14  BP: (128-167)/(62-75) 128/75  SpO2:  [95 %-98 %] 98 %    Physical Exam   Constitutional: Vital signs are normal. She appears well-developed. She is sleeping and uncooperative. She is easily aroused.   Frail, hard of hearing.    HENT:   Head: Atraumatic.   Right Ear: External ear normal. Decreased hearing is noted.   Left Ear: External ear normal. Decreased hearing is noted.   Nose: Nose normal.   Mouth/Throat: Oropharynx is clear and moist.   VERY [place]   Eyes: Pupils are equal, round, and reactive to light. Conjunctivae and EOM are normal. No scleral icterus.   Neck: Normal range of motion. Neck supple. No thyromegaly present.   Cardiovascular: Normal rate and intact distal pulses.  Exam reveals no gallop and no friction rub.    Murmur heard.   Systolic murmur is present with a grade of 3/6   PPM site with dressing CDI without drainage and without erythema   Pulmonary/Chest: Effort normal. She has decreased breath sounds. She has no rhonchi. She has no rales. She exhibits no tenderness.   Left chest pacemaker pocket still not healing, no signs of infection or purulent secretion   Abdominal: Soft. Bowel sounds are normal. She exhibits no mass. There is no tenderness. There is no rebound and no guarding.   Musculoskeletal: Normal range  of motion. She exhibits no edema.   Generalized weakness   Lymphadenopathy:     She has no cervical adenopathy.   Neurological: She is easily aroused. She is disoriented. She displays atrophy. No cranial nerve deficit. She exhibits normal muscle tone.   Skin: Skin is warm. She is not diaphoretic. No erythema.   Left chest wound pocket no signs of infection but not healed   Psychiatric: She has a normal mood and affect. Her behavior is normal. Judgment normal. Cognition and memory are impaired. She exhibits abnormal recent memory and abnormal remote memory.       Fluids    Intake/Output Summary (Last 24 hours) at 07/14/19 1358  Last data filed at 07/14/19 1100   Gross per 24 hour   Intake              940 ml   Output                0 ml   Net              940 ml       Laboratory                        Imaging  DX-CHEST-PORTABLE (1 VIEW)   Final Result      1.  Blunting of the costophrenic angles likely represent small pleural effusions. Adjacent airspace opacification may be secondary to atelectasis or pneumonia.      2.  Increased perihilar interstitial markings are suggestive of mild edema.      DX-CHEST-PORTABLE (1 VIEW)   Final Result      1.  Increase in size of small bilateral pleural effusions.      2.  Unchanged cardiomegaly.      3.  Persistent diffuse interstitial opacity which represent interstitial edema, pneumonia, or fibrosis.      DX-CHEST-PORTABLE (1 VIEW)   Final Result      1.  Mild bibasilar atelectasis/scarring and trace right effusion.   2.  Unipolar pacer lead projects appropriately.      DX-CHEST-LIMITED (1 VIEW)   Final Result      1.  There is slight improvement in changes of interstitial edema with trace amount of pleural fluid.   2.  Pacemaker lead projects over the cardiac silhouette. There is no visible pneumothorax.      DX-CHEST-PORTABLE (1 VIEW)   Final Result         1.  Pulmonary edema and/or infiltrates are identified, which appear somewhat increased since the prior exam.   2.   Atherosclerosis      DX-CHEST-PORTABLE (1 VIEW)   Final Result      Mild worsening reticular opacification may indicate edema      New left pacer with no evidence of pneumothorax      EC-ECHOCARDIOGRAM COMPLETE W/O CONT   Final Result      DX-CHEST-PORTABLE (1 VIEW)   Final Result      No acute cardiac or pulmonary abnormality is noted.      CL-PERMANENT PACEMAKER INSERTION    (Results Pending)        Assessment/Plan  * Late onset Alzheimer's disease with behavioral disturbance- (present on admission)   Assessment & Plan    -Patient has severe underlying dementia  -Behavioral distrubance  -she is very [place] likely contributing to her delusions and behaviors  -Continue celexa and seroquel  -IM haldol as needed for increased agitation.  -Continue restraints to avoid PPM site wound contamination  -PRN haldol.  Patient currently still require restraint and unable to be transferred back to assisted living facility due to restraint.  Needs to continue restraint to protect left chest pacemaker pocket to prevent damage or infection because patient frequently picking the pocket skin.  Patient intermittently need restraint.  Patient is able to sit in the chair today.     Cellulitis   Assessment & Plan    Resolved after abx  -PPM site left chest. Tiger Text sent to PAUL Juarez, for EP Cardiology who will pass along to team for someone to come evaluate  -completed 5 days of empiric ABX coverage today  -afebrile. No leukocytosis  Patient's left pacemaker pocket still not healed completely.  Still require dressing coverage.  Patient is unable to be off restraints due to above reason so unable to be transferred back to assisted living.  Needs to continue restraint to protect left chest pacemaker pocket to prevent damage or infection because patient frequently picking the pocket skin.  Patient intermittently need restraint.  Patient is able to sit in the chair today.     AV block- (present on admission)   Assessment & Plan     -s/p PPM placement 6/21  -HR 60's  -Fall Precautions  Patient's left pacemaker pocket still not healed completely.  Still require dressing coverage.  Patient is unable to be off restraints due to above reason.  Needs to continue restraint to protect left chest pacemaker pocket to prevent damage or infection because patient frequently picking the pocket skin.  Patient intermittently need restraint.  Patient is able to sit in the chair today.     Impaired skin integrity- (present on admission)   Assessment & Plan    -skin is very thin & frail with scattered skin tears, bruises  -nursing skin care protocol  -continue to encourage PO intake. Supplements.    Patient's left pacemaker pocket still not healed completely.  Still require dressing coverage.  Patient is unable to be off restraints due to above reason.  Needs to continue restraint to protect left chest pacemaker pocket to prevent damage or infection because patient frequently picking the pocket skin.  Patient intermittently need restraint.  Patient is able to sit in the chair today.     Pulmonary edema   Assessment & Plan    Increase in size of small bilateral pleural effusions on x-ray  BNP elevated.  Echo stable.  S/P 1 x dose of lasix.  Wean o2 as tolerated.  Intermittent chest x-rays      Generalized weakness- (present on admission)   Assessment & Plan    -PT/OT   -likely will need Home Health versus SNF placement         S/P placement of cardiac pacemaker   Assessment & Plan    -6/21/2019  -She has been afebrile without leukocytosis  -Area of concern medial to the PPM site on the sternum.  Baseball size area of questionable fluid or blood collection. X-ray showed no acute findings  -site appears more concerning for infection - see picture loaded to media section today. Started 5 day course of ABX  -wound culture negative.   -will likely need to remain in the hospital until pacemaker site is completely healed.     Patient's left pacemaker pocket still not  healed completely.  Still require dressing coverage.  Patient is unable to be off restraints due to above reason.  Patient intermittently need restraint.  Patient is able to sit in the chair today.     Normocytic anemia- (present on admission)   Assessment & Plan    -Iron, vitamin B12, and folate all WNL  -Follow labs     Syncopal episodes- (present on admission)   Assessment & Plan    -due to AV block  -now resolved after PPM placement         Stage 3 chronic kidney disease (HCC)- (present on admission)   Assessment & Plan    -chronic  -Avoid nephrotoxins  -Renally dose medications as indicated  -Follow labs       Hypothyroidism- (present on admission)   Assessment & Plan    -TSH low/free t4 elevated  -Levothyroxine decreased from 175 to 150 on 6/22  -TSH in 4-6 weeks            VTE prophylaxis: Heparin    Lizzy Carcamo M.D.       Current Facility-Administered Medications:   •  QUEtiapine (SEROQUEL) tablet 12.5 mg, 12.5 mg, Oral, BID, Juaquin Bailey, A.P.R.N., 12.5 mg at 07/14/19 0437  •  haloperidol lactate (HALDOL) injection 1 mg, 1 mg, Intramuscular, BID PRN, Lizzy Carcamo M.D., 1 mg at 07/11/19 0055  •  levothyroxine (SYNTHROID) tablet 150 mcg, 150 mcg, Oral, AM ES, Nichole Heller M.D., 150 mcg at 07/14/19 0437  •  aspirin (ASA) chewable tab 81 mg, 81 mg, Oral, DAILY, Leanne Poe M.D., 81 mg at 07/14/19 0438  •  citalopram (CELEXA) tablet 20 mg, 20 mg, Oral, DAILY, Leanne Poe M.D., 20 mg at 07/14/19 0438  •  traZODone (DESYREL) tablet 50 mg, 50 mg, Oral, HS PRN, Leanne Poe M.D., 50 mg at 07/13/19 2047  •  senna-docusate (PERICOLACE or SENOKOT S) 8.6-50 MG per tablet 2 Tab, 2 Tab, Oral, BID, 2 Tab at 07/14/19 0437 **AND** polyethylene glycol/lytes (MIRALAX) PACKET 1 Packet, 1 Packet, Oral, QDAY PRN **AND** magnesium hydroxide (MILK OF MAGNESIA) suspension 30 mL, 30 mL, Oral, QDAY PRN **AND** bisacodyl (DULCOLAX) suppository 10 mg, 10 mg, Rectal, QDAY PRN, Leanne Poe M.D.  •   Respiratory Care per Protocol, , Nebulization, Continuous RT, Leanne Poe M.D.  •  heparin injection 5,000 Units, 5,000 Units, Subcutaneous, Q8HRS, Leanne Poe M.D., 5,000 Units at 07/14/19 1332  •  acetaminophen (TYLENOL) tablet 650 mg, 650 mg, Oral, Q6HRS PRN, Leanne Poe M.D., 650 mg at 07/10/19 5055

## 2019-07-14 NOTE — PROGRESS NOTES
Pt was able to get out of bilateral soft wrist restraints and sit at table, pt was given utensils and was able to eat lunch with minimal staff assistance. Pt took a shower, dressings were changed. Dr. Carcamo put in order for bilateral soft non-violent wrist restraints to prevent pt from pulling off dressing over pacemaker. Pt is resting comfortably at this time.

## 2019-07-15 LAB — MAGNESIUM SERPL-MCNC: 2.2 MG/DL (ref 1.5–2.5)

## 2019-07-15 PROCEDURE — 700102 HCHG RX REV CODE 250 W/ 637 OVERRIDE(OP): Performed by: INTERNAL MEDICINE

## 2019-07-15 PROCEDURE — 700102 HCHG RX REV CODE 250 W/ 637 OVERRIDE(OP): Performed by: NURSE PRACTITIONER

## 2019-07-15 PROCEDURE — A9270 NON-COVERED ITEM OR SERVICE: HCPCS | Performed by: NURSE PRACTITIONER

## 2019-07-15 PROCEDURE — 700111 HCHG RX REV CODE 636 W/ 250 OVERRIDE (IP): Performed by: INTERNAL MEDICINE

## 2019-07-15 PROCEDURE — E0191 PROTECTOR HEEL OR ELBOW: HCPCS | Performed by: INTERNAL MEDICINE

## 2019-07-15 PROCEDURE — 770006 HCHG ROOM/CARE - MED/SURG/GYN SEMI*

## 2019-07-15 PROCEDURE — A9270 NON-COVERED ITEM OR SERVICE: HCPCS | Performed by: INTERNAL MEDICINE

## 2019-07-15 PROCEDURE — 99232 SBSQ HOSP IP/OBS MODERATE 35: CPT | Performed by: INTERNAL MEDICINE

## 2019-07-15 PROCEDURE — 36415 COLL VENOUS BLD VENIPUNCTURE: CPT

## 2019-07-15 PROCEDURE — 83735 ASSAY OF MAGNESIUM: CPT

## 2019-07-15 RX ADMIN — QUETIAPINE FUMARATE 12.5 MG: 25 TABLET ORAL at 17:20

## 2019-07-15 RX ADMIN — SENNOSIDES AND DOCUSATE SODIUM 2 TABLET: 8.6; 5 TABLET ORAL at 17:20

## 2019-07-15 RX ADMIN — CITALOPRAM HYDROBROMIDE 20 MG: 20 TABLET ORAL at 04:28

## 2019-07-15 RX ADMIN — HEPARIN SODIUM 5000 UNITS: 5000 INJECTION, SOLUTION INTRAVENOUS; SUBCUTANEOUS at 13:37

## 2019-07-15 RX ADMIN — HEPARIN SODIUM 5000 UNITS: 5000 INJECTION, SOLUTION INTRAVENOUS; SUBCUTANEOUS at 04:27

## 2019-07-15 RX ADMIN — HEPARIN SODIUM 5000 UNITS: 5000 INJECTION, SOLUTION INTRAVENOUS; SUBCUTANEOUS at 21:06

## 2019-07-15 RX ADMIN — SENNOSIDES AND DOCUSATE SODIUM 2 TABLET: 8.6; 5 TABLET ORAL at 04:27

## 2019-07-15 RX ADMIN — ASPIRIN 81 MG 81 MG: 81 TABLET ORAL at 04:28

## 2019-07-15 RX ADMIN — TRAZODONE HYDROCHLORIDE 50 MG: 50 TABLET ORAL at 21:36

## 2019-07-15 RX ADMIN — LEVOTHYROXINE SODIUM 150 MCG: 75 TABLET ORAL at 04:28

## 2019-07-15 RX ADMIN — ACETAMINOPHEN 650 MG: 325 TABLET, FILM COATED ORAL at 21:36

## 2019-07-15 RX ADMIN — QUETIAPINE FUMARATE 12.5 MG: 25 TABLET ORAL at 04:28

## 2019-07-15 ASSESSMENT — PAIN SCALES - PAIN ASSESSMENT IN ADVANCED DEMENTIA (PAINAD)
TOTALSCORE: 0
BODYLANGUAGE: TENSE, DISTRESSED PACING, FIDGETING
BREATHING: NORMAL
FACIALEXPRESSION: SMILING OR INEXPRESSIVE
BODYLANGUAGE: RELAXED
CONSOLABILITY: DISTRACTED OR REASSURED BY VOICE/TOUCH
BREATHING: NORMAL
CONSOLABILITY: NO NEED TO CONSOLE
TOTALSCORE: 3
FACIALEXPRESSION: SAD, FRIGHTENED, FROWN

## 2019-07-15 NOTE — CARE PLAN
Problem: Safety  Goal: Will remain free from falls  Outcome: PROGRESSING AS EXPECTED  Bed in locked and lowest position. Bed alarm on. Nonskid socks in place. Will continue to monitor.     Problem: Pain Management  Goal: Pain level will decrease to patient's comfort goal  Outcome: PROGRESSING AS EXPECTED  No c/o pain or discomfort at this time. Will continue to monitor throughout shift.

## 2019-07-15 NOTE — CARE PLAN
Problem: Discharge Barriers/Planning  Goal: Patient's continuum of care needs will be met  Outcome: PROGRESSING AS EXPECTED  Provide assistance with meals, ROM, assess for adequate healing of pacemaker wound    Problem: Skin Integrity  Goal: Risk for impaired skin integrity will decrease  Outcome: PROGRESSING AS EXPECTED  Q2 turns, assess for wound healing and indication to discontinue restraints.  Provide ROM, adjust restraints and change linens with incontinence, barrier cream to prevent skin breakdown

## 2019-07-15 NOTE — PROGRESS NOTES
Hospital Medicine Daily Progress Note    Date of Service  7/15/2019    Chief Complaint  Bradycardia and syncope.    Hospital Course   This is a 89-year-old female with PMH significant for CKD 3, hypothyroidism, dementia and [place] who transferred here from outside facility for AV block thought to be Mobitz 2.  She underwent PPM placement on 6/21/2019 by cardiology.  Her post procedure course was complicated by ongoing confusion and attempts to pick at her PPM site incision requiring restraints.      Interval Problem Update  7/10. Patient still very confused.  Still require restraint.  Patient's left chest pacemaker pocket incision still not healed completely still require dressing coverage.  -continues to require restraints to keep her from picking at wound.  Unable to finish review of system due to dementia  7/11.  Patient still require restraint.  Denies significant overnight acute event.  Overall pleasant.  Complains of general body achiness. Patient's pain is general, 2-3/10, intermittent and does not radiate to other location, sharp and with some tingling. Can be controlled by pain meds. Dressing in place.  7/12.  Patient still require restraint.  Tolerated other treatment.  Pleasant.  Still confused.  Left chest pocket still not healed yet. Patient otherwise denies fever, chills, nausea, vomiting, adb pain, SOB, CP, headache, constipation, diarrhea, cough, or sputum.  7/13. Patient overall remained stable.  In order to protect skin incision still need to be on restrain.  Patient has no signs of fever, chills, nausea vomiting diarrhea.  Complains of general fatigue.  Patient also continued to showing signs of picking her wound.  7/14.  Patient overall is calm.  Patient still require restraint most of the time but today able to sit up in chair and out of the room assisted by medical staff without restraint with good response.  Patient still needs to protect her skin integrity by being applied with restraint.   Patient complains of general body achiness. Patient's pain is general 2-3/10, intermittent and does not radiate to other location, sharp and with some tingling. Can be controlled by pain meds. Dressing in place.  7/15.  Patient has been pleasant and comfortable overnight.  Patient was able to be off restrain for short period of time yesterday.  But still overall require restraint to protect skin integrity. Patient otherwise denies fever, chills, nausea, vomiting, adb pain, SOB, CP, headache, constipation, diarrhea, cough, or sputum.      Consultants/Specialty  -Cardiology -reconsulted to evaluated pacemaker pocket incision.    Code Status  DNAR/DNI    Disposition  Her currently Senior Living Facility is not comfortable taking her back until her PPM site has healed. PT/OT recommend HH - referral placed    Review of Systems  Review of Systems   Unable to perform ROS: Dementia (and VERY [place])        Physical Exam  Temp:  [36.2 °C (97.1 °F)-36.8 °C (98.2 °F)] 36.2 °C (97.1 °F)  Pulse:  [59-61] 60  Resp:  [12-18] 15  BP: (115-142)/(53-61) 115/60  SpO2:  [95 %-100 %] 96 %    Physical Exam   Constitutional: Vital signs are normal. She appears well-developed. She is sleeping and uncooperative. She is easily aroused.   Frail, hard of hearing.    HENT:   Head: Normocephalic and atraumatic.   Right Ear: Decreased hearing is noted.   Left Ear: Decreased hearing is noted.   Nose: Nose normal.   Mouth/Throat: Oropharynx is clear and moist.   VERY [place]   Eyes: Pupils are equal, round, and reactive to light. EOM are normal. No scleral icterus.   Neck: Normal range of motion. Neck supple. No tracheal deviation present.   Cardiovascular: Normal rate and intact distal pulses.  Exam reveals no gallop and no friction rub.    Murmur heard.   Systolic murmur is present with a grade of 3/6   PPM site with dressing CDI without drainage and without erythema   Pulmonary/Chest: Effort normal. She has decreased breath sounds. She has no wheezes.  She has no rhonchi. She exhibits no tenderness.   Left chest pacemaker pocket still not healing, no signs of infection or purulent secretion   Abdominal: Soft. Bowel sounds are normal. She exhibits no mass. There is no tenderness. There is no guarding.   Musculoskeletal: Normal range of motion. She exhibits no edema.   Generalized weakness   Lymphadenopathy:     She has no cervical adenopathy.   Neurological: She is alert and easily aroused. She is disoriented. She displays atrophy. No cranial nerve deficit. She exhibits normal muscle tone. Coordination normal.   Skin: Skin is warm. No erythema.   Left chest wound pocket no signs of infection but not healed   Psychiatric: She has a normal mood and affect. Her behavior is normal. Judgment normal. Cognition and memory are impaired. She exhibits abnormal recent memory and abnormal remote memory.       Fluids    Intake/Output Summary (Last 24 hours) at 07/15/19 1052  Last data filed at 07/15/19 1030   Gross per 24 hour   Intake              280 ml   Output                0 ml   Net              280 ml       Laboratory                        Imaging  DX-CHEST-PORTABLE (1 VIEW)   Final Result      1.  Blunting of the costophrenic angles likely represent small pleural effusions. Adjacent airspace opacification may be secondary to atelectasis or pneumonia.      2.  Increased perihilar interstitial markings are suggestive of mild edema.      DX-CHEST-PORTABLE (1 VIEW)   Final Result      1.  Increase in size of small bilateral pleural effusions.      2.  Unchanged cardiomegaly.      3.  Persistent diffuse interstitial opacity which represent interstitial edema, pneumonia, or fibrosis.      DX-CHEST-PORTABLE (1 VIEW)   Final Result      1.  Mild bibasilar atelectasis/scarring and trace right effusion.   2.  Unipolar pacer lead projects appropriately.      DX-CHEST-LIMITED (1 VIEW)   Final Result      1.  There is slight improvement in changes of interstitial edema with trace  amount of pleural fluid.   2.  Pacemaker lead projects over the cardiac silhouette. There is no visible pneumothorax.      DX-CHEST-PORTABLE (1 VIEW)   Final Result         1.  Pulmonary edema and/or infiltrates are identified, which appear somewhat increased since the prior exam.   2.  Atherosclerosis      DX-CHEST-PORTABLE (1 VIEW)   Final Result      Mild worsening reticular opacification may indicate edema      New left pacer with no evidence of pneumothorax      EC-ECHOCARDIOGRAM COMPLETE W/O CONT   Final Result      DX-CHEST-PORTABLE (1 VIEW)   Final Result      No acute cardiac or pulmonary abnormality is noted.      CL-PERMANENT PACEMAKER INSERTION    (Results Pending)        Assessment/Plan  * Late onset Alzheimer's disease with behavioral disturbance- (present on admission)   Assessment & Plan    -Patient has severe underlying dementia  -Behavioral distrubance  -she is very [place] likely contributing to her delusions and behaviors  -Continue celexa and seroquel  -IM haldol as needed for increased agitation.  -Continue restraints to avoid PPM site wound contamination  -PRN haldol.  Still require restraint to protect skin integrity       Cellulitis   Assessment & Plan    Currently resolved  -PPM site left chest incision still not completely healed still require coverage to prevent infection  -completed 5 days of empiric ABX coverage today  -afebrile. No leukocytosis       AV block- (present on admission)   Assessment & Plan    -s/p PPM placement 6/21  -HR 60's  -Fall Precautions     Impaired skin integrity- (present on admission)   Assessment & Plan    -skin is very thin & frail with scattered skin tears, bruises  -nursing skin care protocol  -continue to encourage PO intake. Supplements.         Pulmonary edema   Assessment & Plan    Increase in size of small bilateral pleural effusions on x-ray  BNP elevated.  Echo stable.  S/P 1 x dose of lasix.  Wean o2 as tolerated.  Intermittent chest x-rays       Generalized weakness- (present on admission)   Assessment & Plan    -PT/OT   -likely will need Home Health versus SNF placement         S/P placement of cardiac pacemaker   Assessment & Plan    -6/21/2019  -She has been afebrile without leukocytosis  -Area of concern medial to the PPM site on the sternum.  Baseball size area of questionable fluid or blood collection. X-ray showed no acute findings  -site appears more concerning for infection - see picture loaded to media section today. Started 5 day course of ABX  -wound culture negative.   -will likely need to remain in the hospital until pacemaker site is completely healed.     Currently still require restraint to prevent self injury and picking to pacemaker pocket incision     Normocytic anemia- (present on admission)   Assessment & Plan    -Iron, vitamin B12, and folate all WNL  -Follow labs     Syncopal episodes- (present on admission)   Assessment & Plan    -due to AV block  -now resolved after PPM placement         Stage 3 chronic kidney disease (HCC)- (present on admission)   Assessment & Plan    -chronic  -Avoid nephrotoxins  -Renally dose medications as indicated  -Follow labs       Hypothyroidism- (present on admission)   Assessment & Plan    -TSH low/free t4 elevated  -Levothyroxine decreased from 175 to 150 on 6/22  -TSH in 4-6 weeks             VTE prophylaxis: Heparin    Lizzy Carcamo M.D.       Current Facility-Administered Medications:   •  QUEtiapine (SEROQUEL) tablet 12.5 mg, 12.5 mg, Oral, BID, Juaquin Bailey, A.P.R.N., 12.5 mg at 07/15/19 0428  •  haloperidol lactate (HALDOL) injection 1 mg, 1 mg, Intramuscular, BID PRN, Lizzy Carcamo M.D., 1 mg at 07/11/19 0055  •  levothyroxine (SYNTHROID) tablet 150 mcg, 150 mcg, Oral, AM ES, Nichole Heller M.D., 150 mcg at 07/15/19 0428  •  aspirin (ASA) chewable tab 81 mg, 81 mg, Oral, DAILY, Leanne Poe M.D., 81 mg at 07/15/19 0428  •  citalopram (CELEXA) tablet 20 mg, 20 mg, Oral, DAILY, Leanne Caraballo  MAIRA Poe, 20 mg at 07/15/19 0428  •  traZODone (DESYREL) tablet 50 mg, 50 mg, Oral, HS PRN, Leanne Poe M.D., 50 mg at 07/13/19 2047  •  senna-docusate (PERICOLACE or SENOKOT S) 8.6-50 MG per tablet 2 Tab, 2 Tab, Oral, BID, 2 Tab at 07/15/19 0427 **AND** polyethylene glycol/lytes (MIRALAX) PACKET 1 Packet, 1 Packet, Oral, QDAY PRN **AND** magnesium hydroxide (MILK OF MAGNESIA) suspension 30 mL, 30 mL, Oral, QDAY PRN **AND** bisacodyl (DULCOLAX) suppository 10 mg, 10 mg, Rectal, QDAY PRN, Leanne Poe M.D.  •  Respiratory Care per Protocol, , Nebulization, Continuous RT, Leanne Poe M.D.  •  heparin injection 5,000 Units, 5,000 Units, Subcutaneous, Q8HRS, Leanne Poe M.D., 5,000 Units at 07/15/19 0427  •  acetaminophen (TYLENOL) tablet 650 mg, 650 mg, Oral, Q6HRS PRN, Leanne Poe M.D., 650 mg at 07/10/19 0445

## 2019-07-15 NOTE — PROGRESS NOTES
Pt has been up in wheelchair and out at nurses station since 1000, one trip to commode, tolerating well. Restraints were discontinued when pt was up to chair, she is doing well and has not tried to remove her dressing. Restraints will be left off, unless pt starts to take dressing off or scratch at wound.

## 2019-07-15 NOTE — PROGRESS NOTES
Pt up to chair today. Sitting at nurses station in wheelchair and assisted to commode. Trial pt on removal of restraints and giving pt distractions and activities. Use of white board to communicate as pt very [place].

## 2019-07-15 NOTE — PROGRESS NOTES
2 RN skin check completed with Karma REYNA.  Devices in place B/L soft wrist restraints.  Skin assessed under devices yes.  The following interventions in place: Q2H turns. Incontinence care. Waffle overlay. Heel boots ordered.     Scattered bruising noted. ST to right posterior upper arm. ST on left upper arm. Dressing over pacer site C/D/I. Scabs to BLE. Sacrum red and blanching. Mepliex placed.

## 2019-07-15 NOTE — PROGRESS NOTES
A&OX1. Pt resting in bed throughout shift. No c/o pain or discomfort. Q2H turns in place. B/L soft restraints in place. Fall precautions in place. Call bell and bedside table within reach. Will continue to monitor.    Final Anesthesia Post-op Assessment    Patient: Edison Montes De Oca  Procedure(s) Performed: LEFT CATARACT PHACOEMULSIFICATION WITH INTRAOCULAR LENS IMPLANT - LEFT  Anesthesia type: Monitor Anesthesia Care    Vital Last Value   Temperature 97.8   Pulse 98   Respiratory Rate 14   Non-Invasive   Blood Pressure 119/64   Arterial  Blood Pressure     Pulse Oximetry 98     Last 24 I/O: No intake or output data in the 24 hours ending 03/19/19 0804    PATIENT LOCATION: PACU Phase 2  POST-OP VITAL SIGNS: stable  LEVEL OF CONSCIOUSNESS: participates in exam, answers questions appropriately, alert, awake and oriented  RESPIRATORY STATUS: spontaneous ventilation and unassisted  CARDIOVASCULAR: blood pressure returned to baseline  HYDRATION: euvolemic    PAIN MANAGEMENT: well controlled  NAUSEA: None  AIRWAY PATENCY: patent  POST-OP ASSESSMENT: no complications, patient tolerated procedure well with no complications, no evidence of recall and sufficiently recovered from acute administration of anesthesia effects and able to participate in evaluation  COMPLICATIONS: none  Comments: VSS.  HANDOFF:  Handoff to receiving nurse was performed and questions were answered

## 2019-07-16 LAB
ANION GAP SERPL CALC-SCNC: 6 MMOL/L (ref 0–11.9)
BUN SERPL-MCNC: 21 MG/DL (ref 8–22)
CALCIUM SERPL-MCNC: 9.1 MG/DL (ref 8.5–10.5)
CHLORIDE SERPL-SCNC: 103 MMOL/L (ref 96–112)
CO2 SERPL-SCNC: 29 MMOL/L (ref 20–33)
CREAT SERPL-MCNC: 0.97 MG/DL (ref 0.5–1.4)
GLUCOSE SERPL-MCNC: 96 MG/DL (ref 65–99)
POTASSIUM SERPL-SCNC: 4.8 MMOL/L (ref 3.6–5.5)
SODIUM SERPL-SCNC: 138 MMOL/L (ref 135–145)

## 2019-07-16 PROCEDURE — 770006 HCHG ROOM/CARE - MED/SURG/GYN SEMI*

## 2019-07-16 PROCEDURE — A9270 NON-COVERED ITEM OR SERVICE: HCPCS | Performed by: NURSE PRACTITIONER

## 2019-07-16 PROCEDURE — 700102 HCHG RX REV CODE 250 W/ 637 OVERRIDE(OP): Performed by: NURSE PRACTITIONER

## 2019-07-16 PROCEDURE — A9270 NON-COVERED ITEM OR SERVICE: HCPCS | Performed by: INTERNAL MEDICINE

## 2019-07-16 PROCEDURE — 99232 SBSQ HOSP IP/OBS MODERATE 35: CPT | Performed by: HOSPITALIST

## 2019-07-16 PROCEDURE — 700111 HCHG RX REV CODE 636 W/ 250 OVERRIDE (IP): Performed by: INTERNAL MEDICINE

## 2019-07-16 PROCEDURE — 700102 HCHG RX REV CODE 250 W/ 637 OVERRIDE(OP): Performed by: INTERNAL MEDICINE

## 2019-07-16 PROCEDURE — 80048 BASIC METABOLIC PNL TOTAL CA: CPT

## 2019-07-16 PROCEDURE — 97530 THERAPEUTIC ACTIVITIES: CPT

## 2019-07-16 PROCEDURE — 36415 COLL VENOUS BLD VENIPUNCTURE: CPT

## 2019-07-16 RX ADMIN — ASPIRIN 81 MG 81 MG: 81 TABLET ORAL at 06:17

## 2019-07-16 RX ADMIN — LEVOTHYROXINE SODIUM 150 MCG: 75 TABLET ORAL at 06:17

## 2019-07-16 RX ADMIN — CITALOPRAM HYDROBROMIDE 20 MG: 20 TABLET ORAL at 06:17

## 2019-07-16 RX ADMIN — QUETIAPINE FUMARATE 12.5 MG: 25 TABLET ORAL at 17:28

## 2019-07-16 RX ADMIN — SENNOSIDES AND DOCUSATE SODIUM 2 TABLET: 8.6; 5 TABLET ORAL at 17:28

## 2019-07-16 RX ADMIN — HEPARIN SODIUM 5000 UNITS: 5000 INJECTION, SOLUTION INTRAVENOUS; SUBCUTANEOUS at 13:57

## 2019-07-16 RX ADMIN — HEPARIN SODIUM 5000 UNITS: 5000 INJECTION, SOLUTION INTRAVENOUS; SUBCUTANEOUS at 21:19

## 2019-07-16 RX ADMIN — SENNOSIDES AND DOCUSATE SODIUM 2 TABLET: 8.6; 5 TABLET ORAL at 06:18

## 2019-07-16 RX ADMIN — HEPARIN SODIUM 5000 UNITS: 5000 INJECTION, SOLUTION INTRAVENOUS; SUBCUTANEOUS at 06:18

## 2019-07-16 RX ADMIN — QUETIAPINE FUMARATE 12.5 MG: 25 TABLET ORAL at 06:17

## 2019-07-16 RX ADMIN — TRAZODONE HYDROCHLORIDE 50 MG: 50 TABLET ORAL at 21:19

## 2019-07-16 ASSESSMENT — COGNITIVE AND FUNCTIONAL STATUS - GENERAL
TURNING FROM BACK TO SIDE WHILE IN FLAT BAD: A LOT
SUGGESTED CMS G CODE MODIFIER MOBILITY: CM
STANDING UP FROM CHAIR USING ARMS: A LOT
MOVING FROM LYING ON BACK TO SITTING ON SIDE OF FLAT BED: UNABLE
MOBILITY SCORE: 8
CLIMB 3 TO 5 STEPS WITH RAILING: TOTAL
MOVING TO AND FROM BED TO CHAIR: UNABLE
WALKING IN HOSPITAL ROOM: TOTAL

## 2019-07-16 ASSESSMENT — PAIN SCALES - PAIN ASSESSMENT IN ADVANCED DEMENTIA (PAINAD)
TOTALSCORE: 0
CONSOLABILITY: NO NEED TO CONSOLE
FACIALEXPRESSION: SMILING OR INEXPRESSIVE
BREATHING: NORMAL
BREATHING: NORMAL
FACIALEXPRESSION: SMILING OR INEXPRESSIVE
BODYLANGUAGE: RELAXED
BODYLANGUAGE: RELAXED
CONSOLABILITY: NO NEED TO CONSOLE
TOTALSCORE: 0

## 2019-07-16 ASSESSMENT — GAIT ASSESSMENTS: GAIT LEVEL OF ASSIST: UNABLE TO PARTICIPATE

## 2019-07-16 NOTE — PROGRESS NOTES
2 RN skin check complete with MARIBELL Valdez  Devices in place: oxygen tubing and heel float boots  Skin assessed under devices Yes.  Confirmed pressure ulcers found on  N/A.   New potential pressure ulcers noted on N/A   The following skin issues noted:   Noted pinkish blanching, bilateral back of the ears. Skin tear noted on right posterior upper arm.  Large area of bruising noted BUE. Pinkish to red blanching spot on the back.  Skin tear on upper left arm. Scab and bruising noted on abdominal area. Left upper chest  pacemaker site  Is red with scar on it. Scabs seen in lower extremities. Dusky BLE noted. Heels are pinkish red and blanching. Sacrum is red and blanching. Mepilex in place for protection.    The following interventions in place; Q2 turns. Heel float boots, linen changes with incontinence. Keep pt dry and clean.

## 2019-07-16 NOTE — THERAPY
"Physical Therapy Treatment completed.   Bed Mobility:  Supine to Sit: Maximal Assist  Transfers: Sit to Stand: Unable to Participate  Gait: Level Of Assist: Unable to Participate with Will Continue to Assess for Equipment Needs       Plan of Care: Patient with no further skilled PT needs in the acute care setting at this time  Discharge Recommendations: Equipment: Wheelchair. Post-acute therapy Discharge to a transitional care facility for continued skilled therapy services.     See \"Rehab Therapy-Acute\" Patient Summary Report for complete documentation.     Pt was lethargic throughout entire treatment session and unable to follow 1 step commands. Due to pt cognitve deficits unable to participate in therapy. Pt likely close to mobility and cognitive baseline. Will continue to follow for DC needs only. Currently recommend post acute care placement w/ 24 hour care.   "

## 2019-07-16 NOTE — CARE PLAN
Problem: Safety  Goal: Will remain free from falls  Outcome: PROGRESSING AS EXPECTED  Bed alarm in place. Bed in lowest position, treaded socks on, personal belongings and call light within reach, instructed to call for any assistance, hourly rounding in place.    Problem: Psychosocial Needs:  Goal: Level of anxiety will decrease  Outcome: PROGRESSING AS EXPECTED  Constantly reoriented pt. Comforting pt. Therapeutic touch. PRN trazodone given to help pt sleep.

## 2019-07-16 NOTE — PROGRESS NOTES
Assumed care of pt at shift change. A/Ox4, discussed plan of care. Pt on room air. Soft bilateral wrist restraint  In place. Pt was restless and yelling for help. RN kept reoriented pt and talking with pt reassuring pt and holding her hands.  Trazodone given to help pt get some rest. Pt fell into sleep around 23:00.  All needs met at this time. Bed in lowest position, treaded socks on, personal belongings and call light within reach, instructed to call for any assistance, hourly rounding in place.

## 2019-07-16 NOTE — PROGRESS NOTES
Hospital Medicine Daily Progress Note    Date of Service  7/16/2019    Chief Complaint  Bradycardia and syncope.    Hospital Course   This is a 89-year-old female with PMH significant for CKD 3, hypothyroidism, dementia and [place] who transferred here from outside facility for AV block thought to be [place] 2.  She underwent PPM placement on 6/21/2019 by cardiology.  Her post procedure course was complicated by ongoing confusion and attempts to pick at her PPM site incision requiring restraints.      Interval Problem Update  Tired, continues to be confused. With bilateral soft wrist restraints secondary to picking at PPM and concern for infection. No acute overnight events.    Consultants/Specialty  Cardiology -reconsulted to evaluated pacemaker pocket incision.    Code Status  DNAR/DNI    Disposition  Her currently Senior Living Facility is not comfortable taking her back until her PPM site has healed. PT/OT recommend HH - referral placed.    Review of Systems  Review of Systems   Unable to perform ROS: Dementia (and very hard of hearing)        Physical Exam  Temp:  [36.1 °C (97 °F)-36.8 °C (98.2 °F)] 36.1 °C (97 °F)  Pulse:  [60-62] 62  Resp:  [15-18] 18  BP: (114-127)/(55-78) 127/78  SpO2:  [93 %-97 %] 96 %    Physical Exam   Constitutional: Vital signs are normal. She appears well-developed. She appears listless. She is sleeping and uncooperative. She is easily aroused. No distress.   Frail, hard of hearing.    HENT:   Head: Normocephalic and atraumatic.   Right Ear: Decreased hearing is noted.   Left Ear: Decreased hearing is noted.   Nose: Nose normal.   Mouth/Throat: Oropharynx is clear and moist.   VERY [place]   Eyes: Pupils are equal, round, and reactive to light. EOM are normal. No scleral icterus.   Neck: Normal range of motion. No tracheal deviation present.   Cardiovascular: Normal rate and intact distal pulses.  Exam reveals no gallop and no friction rub.    Murmur heard.   Systolic murmur is present with a  grade of 3/6   PPM site with dressing CDI without drainage   Pulmonary/Chest: Effort normal. No stridor. She has decreased breath sounds. She has no wheezes. She has no rhonchi. She exhibits no tenderness.   Left chest pacemaker pocket still not healing, no signs of infection or purulent secretion   Abdominal: Soft. Bowel sounds are normal. She exhibits no distension. There is no tenderness.   Musculoskeletal: Normal range of motion. She exhibits no edema or tenderness.   Generalized weakness   Neurological: She is easily aroused. She appears listless. She is disoriented. She displays atrophy. No cranial nerve deficit.   Skin: Skin is warm. No erythema.   Left chest wound pocket no signs of infection but not healed   Psychiatric: She has a normal mood and affect. Judgment normal. Her speech is delayed. She is slowed. Cognition and memory are impaired.   Vitals reviewed.      Fluids    Intake/Output Summary (Last 24 hours) at 07/16/19 0755  Last data filed at 07/15/19 1400   Gross per 24 hour   Intake              380 ml   Output                0 ml   Net              380 ml       Laboratory      Recent Labs      07/16/19   0310   SODIUM  138   POTASSIUM  4.8   CHLORIDE  103   CO2  29   GLUCOSE  96   BUN  21   CREATININE  0.97   CALCIUM  9.1                   Imaging  DX-CHEST-PORTABLE (1 VIEW)   Final Result      1.  Blunting of the costophrenic angles likely represent small pleural effusions. Adjacent airspace opacification may be secondary to atelectasis or pneumonia.      2.  Increased perihilar interstitial markings are suggestive of mild edema.      DX-CHEST-PORTABLE (1 VIEW)   Final Result      1.  Increase in size of small bilateral pleural effusions.      2.  Unchanged cardiomegaly.      3.  Persistent diffuse interstitial opacity which represent interstitial edema, pneumonia, or fibrosis.      DX-CHEST-PORTABLE (1 VIEW)   Final Result      1.  Mild bibasilar atelectasis/scarring and trace right effusion.    2.  Unipolar pacer lead projects appropriately.      DX-CHEST-LIMITED (1 VIEW)   Final Result      1.  There is slight improvement in changes of interstitial edema with trace amount of pleural fluid.   2.  Pacemaker lead projects over the cardiac silhouette. There is no visible pneumothorax.      DX-CHEST-PORTABLE (1 VIEW)   Final Result         1.  Pulmonary edema and/or infiltrates are identified, which appear somewhat increased since the prior exam.   2.  Atherosclerosis      DX-CHEST-PORTABLE (1 VIEW)   Final Result      Mild worsening reticular opacification may indicate edema      New left pacer with no evidence of pneumothorax      EC-ECHOCARDIOGRAM COMPLETE W/O CONT   Final Result      DX-CHEST-PORTABLE (1 VIEW)   Final Result      No acute cardiac or pulmonary abnormality is noted.      CL-PERMANENT PACEMAKER INSERTION    (Results Pending)        Assessment/Plan  * Late onset Alzheimer's disease with behavioral disturbance- (present on admission)   Assessment & Plan    -Patient has severe underlying dementia with behavioral disturbance.  -she is very [place] likely contributing to her delusions and behaviors  -Continue celexa and seroquel  -Continue restraints to avoid PPM site wound contamination  -PRN haldol       Cellulitis   Assessment & Plan    Currently resolved. Afebrile, no leukocytosis  -PPM site left chest incision still not completely healed still require coverage to prevent infection  -completed 5 days of empiric ABX coverage     AV block- (present on admission)   Assessment & Plan    Resolved.   -s/p PPM placement 6/21. HR 60's  -Fall Precautions     S/P placement of cardiac pacemaker   Assessment & Plan    Placed 6/21/2019  - s/p 5 day course of ABX  -wound culture negative  -will need to remain in the hospital until pacemaker site is completely healed.      Impaired skin integrity- (present on admission)   Assessment & Plan    -skin is very thin & frail with scattered skin tears,  bruises  -nursing skin care protocol  -continue to encourage PO intake. Supplements.     Pulmonary edema   Assessment & Plan    Increase in size of small bilateral pleural effusions on x-ray. BNP elevated. Echo stable.  - S/P 1 x dose of lasix  - Wean o2 as tolerated, currently on 3L NC  - repeat CXR tomorrow     Generalized weakness- (present on admission)   Assessment & Plan    -PT/OT   -likely will need Home Health versus SNF placement     Normocytic anemia- (present on admission)   Assessment & Plan    -Iron, vitamin B12, and folate all WNL  -Follow labs     Syncopal episodes- (present on admission)   Assessment & Plan    Resolved.   -due to AV block, now s/p PPM         Stage 3 chronic kidney disease (HCC)- (present on admission)   Assessment & Plan    Chronic, at baseline  -Avoid nephrotoxins  -Renally dose medications as indicated       Hypothyroidism- (present on admission)   Assessment & Plan    -TSH low/free t4 elevated  -synthroid decreased from 175 to 150 on 6/22  -TSH in 4-6 weeks, outpatient follow up           VTE prophylaxis: Heparin    Rupa Espana M.D.       Current Facility-Administered Medications:   •  QUEtiapine (SEROQUEL) tablet 12.5 mg, 12.5 mg, Oral, BID, Juaquin Bailey, A.P.R.N., 12.5 mg at 07/16/19 0617  •  haloperidol lactate (HALDOL) injection 1 mg, 1 mg, Intramuscular, BID PRN, Lizzy Carcamo M.D., 1 mg at 07/11/19 0055  •  levothyroxine (SYNTHROID) tablet 150 mcg, 150 mcg, Oral, AM ES, Nichole Heller M.D., 150 mcg at 07/16/19 0617  •  aspirin (ASA) chewable tab 81 mg, 81 mg, Oral, DAILY, Leanne Poe M.D., 81 mg at 07/16/19 0617  •  citalopram (CELEXA) tablet 20 mg, 20 mg, Oral, DAILY, Leanne Poe M.D., 20 mg at 07/16/19 0617  •  traZODone (DESYREL) tablet 50 mg, 50 mg, Oral, HS PRN, Leanne Poe M.D., 50 mg at 07/15/19 2136  •  senna-docusate (PERICOLACE or SENOKOT S) 8.6-50 MG per tablet 2 Tab, 2 Tab, Oral, BID, 2 Tab at 07/16/19 0616 **AND** polyethylene  glycol/lytes (MIRALAX) PACKET 1 Packet, 1 Packet, Oral, QDAY PRN **AND** magnesium hydroxide (MILK OF MAGNESIA) suspension 30 mL, 30 mL, Oral, QDAY PRN **AND** bisacodyl (DULCOLAX) suppository 10 mg, 10 mg, Rectal, QDAY PRN, Leanne Poe M.D.  •  Respiratory Care per Protocol, , Nebulization, Continuous RT, Leanne Poe M.D.  •  heparin injection 5,000 Units, 5,000 Units, Subcutaneous, Q8HRS, Leanne Poe M.D., 5,000 Units at 07/16/19 0699  •  acetaminophen (TYLENOL) tablet 650 mg, 650 mg, Oral, Q6HRS PRN, Leanne Poe M.D., 650 mg at 07/15/19 3085

## 2019-07-16 NOTE — PROGRESS NOTES
2 RN skin check complete with Madonna RN  Devices in place; oxygen tubing and heel float boots  Skin assessed under devices Yes.  Confirmed pressure ulcers found on  N/A.   New potential pressure ulcers noted on N/A  Noted dark purple spots on bottom side of feet bilat on ball of feet where foot bones rest. Likes due to having feet on ground while up in wheelchair. After 20 minutes much improved, almost gone. Camera missing from [employer]. Continue to assess and ensure pillows under foot when in wheelchair in the future.    Noted pinkish blanching, bilateral back of the ears. Skin tear noted on right posterior shoulder. Bruise on the left shoulder. Pinkish to red blanching spot on the back.  Skin tear on upper left arm. Skin tag and bruise in abdominal area. Bruise spot in left lateral chest. Scabs seen in lower extremities.     Dressing CDI upper left chest at pacemaker site.     The following interventions in place; assisted to turn to sides every 2 hours. Heel float boots, linen changes with incontinence.

## 2019-07-16 NOTE — PROGRESS NOTES
"Pt up to chair for breakfast and lunch. Tolerates well, requires frequent reassurance, calls for help and when asked what she needs help with she responds, \"I don't know\", or \"nothing\". Does not appear in distress. Assistance provided with meals, poor appetite.  Pacemaker site improved with only on small scab left.   Pt does well up in chair with supervision, however does require redirection and pulls off oxygen and dressings when alone in bed.  "

## 2019-07-16 NOTE — CARE PLAN
Problem: Discharge Barriers/Planning  Goal: Patient's continuum of care needs will be met  Outcome: PROGRESSING AS EXPECTED  Pacemaker wound site improving with only one small scab remaining, dressing in place, restraints in use when pt unattended in bed as pt will pull dressing and pick at scab.    Problem: Skin Integrity  Goal: Risk for impaired skin integrity will decrease  Outcome: PROGRESSING AS EXPECTED  Linen changes with incontinence, commode offered, Q2 turns, barrier cream and heel float boots applied

## 2019-07-16 NOTE — PROGRESS NOTES
2 RN skin check complete with Sarahi RN   Devices in place; oxygen tubing and heel float boots   Skin assessed under devices Yes.   Confirmed pressure ulcers found on N/A.   New potential pressure ulcers noted on coccyx. And skin tears bilat upper arms not healing well. Photographs taken and wound consult ordered.      Noted pinkish blanching, bilateral back of the ears. Skin tear noted on right posterior shoulder. Bruise on the left shoulder. Pinkish to red blanching spot on the back. Skin tear on upper left arm. Skin tag and bruise in abdominal area. Bruise spot in left lateral chest. Scabs seen in lower extremities.   Dressing CDI upper left chest at pacemaker site.  The following interventions in place; assisted to turn to sides every 2 hours. Heel float boots, linen changes with incontinence.

## 2019-07-16 NOTE — PROGRESS NOTES
Pt spent most of day in wheelchair at table. Was free from restraints without a problem. Back to bed at 1700 and became very anxious and restless, started pulling again at her dressings and removing nasal canula.   MD updated and bilat soft wrist restraints put back on pt.

## 2019-07-16 NOTE — CARE PLAN
Problem: Nutritional:  Goal: Achieve adequate nutritional intake  Patient will consume 25-50% of meals    Outcome: PROGRESSING SLOWER THAN EXPECTED    Intervention: Monitor PO intake, weights, and laboratory values  Pt continues to have minimal intake of meals, consuming < 25% of majority.   Pt does drink Boost however, which she is receiving TID to supplement poor intake.   Pt is receiving 1:1 assistance with all meals.     RD following

## 2019-07-17 LAB
ANION GAP SERPL CALC-SCNC: 8 MMOL/L (ref 0–11.9)
BUN SERPL-MCNC: 18 MG/DL (ref 8–22)
CALCIUM SERPL-MCNC: 9 MG/DL (ref 8.5–10.5)
CHLORIDE SERPL-SCNC: 104 MMOL/L (ref 96–112)
CO2 SERPL-SCNC: 26 MMOL/L (ref 20–33)
CREAT SERPL-MCNC: 0.84 MG/DL (ref 0.5–1.4)
ERYTHROCYTE [DISTWIDTH] IN BLOOD BY AUTOMATED COUNT: 48.9 FL (ref 35.9–50)
GLUCOSE SERPL-MCNC: 88 MG/DL (ref 65–99)
HCT VFR BLD AUTO: 44.6 % (ref 37–47)
HGB BLD-MCNC: 14.6 G/DL (ref 12–16)
MCH RBC QN AUTO: 30.5 PG (ref 27–33)
MCHC RBC AUTO-ENTMCNC: 32.7 G/DL (ref 33.6–35)
MCV RBC AUTO: 93.3 FL (ref 81.4–97.8)
PLATELET # BLD AUTO: 250 K/UL (ref 164–446)
PMV BLD AUTO: 10.4 FL (ref 9–12.9)
POTASSIUM SERPL-SCNC: 4.4 MMOL/L (ref 3.6–5.5)
RBC # BLD AUTO: 4.78 M/UL (ref 4.2–5.4)
SODIUM SERPL-SCNC: 138 MMOL/L (ref 135–145)
WBC # BLD AUTO: 7.2 K/UL (ref 4.8–10.8)

## 2019-07-17 PROCEDURE — 700111 HCHG RX REV CODE 636 W/ 250 OVERRIDE (IP): Performed by: INTERNAL MEDICINE

## 2019-07-17 PROCEDURE — 99232 SBSQ HOSP IP/OBS MODERATE 35: CPT | Performed by: HOSPITALIST

## 2019-07-17 PROCEDURE — 770006 HCHG ROOM/CARE - MED/SURG/GYN SEMI*

## 2019-07-17 PROCEDURE — 700102 HCHG RX REV CODE 250 W/ 637 OVERRIDE(OP): Performed by: INTERNAL MEDICINE

## 2019-07-17 PROCEDURE — 700102 HCHG RX REV CODE 250 W/ 637 OVERRIDE(OP): Performed by: NURSE PRACTITIONER

## 2019-07-17 PROCEDURE — 36415 COLL VENOUS BLD VENIPUNCTURE: CPT

## 2019-07-17 PROCEDURE — A9270 NON-COVERED ITEM OR SERVICE: HCPCS | Performed by: INTERNAL MEDICINE

## 2019-07-17 PROCEDURE — 80048 BASIC METABOLIC PNL TOTAL CA: CPT

## 2019-07-17 PROCEDURE — 85027 COMPLETE CBC AUTOMATED: CPT

## 2019-07-17 PROCEDURE — A9270 NON-COVERED ITEM OR SERVICE: HCPCS | Performed by: NURSE PRACTITIONER

## 2019-07-17 RX ADMIN — LEVOTHYROXINE SODIUM 150 MCG: 75 TABLET ORAL at 04:26

## 2019-07-17 RX ADMIN — SENNOSIDES AND DOCUSATE SODIUM 2 TABLET: 8.6; 5 TABLET ORAL at 04:26

## 2019-07-17 RX ADMIN — HEPARIN SODIUM 5000 UNITS: 5000 INJECTION, SOLUTION INTRAVENOUS; SUBCUTANEOUS at 14:15

## 2019-07-17 RX ADMIN — ASPIRIN 81 MG 81 MG: 81 TABLET ORAL at 04:26

## 2019-07-17 RX ADMIN — QUETIAPINE FUMARATE 12.5 MG: 25 TABLET ORAL at 17:05

## 2019-07-17 RX ADMIN — HEPARIN SODIUM 5000 UNITS: 5000 INJECTION, SOLUTION INTRAVENOUS; SUBCUTANEOUS at 04:27

## 2019-07-17 RX ADMIN — CITALOPRAM HYDROBROMIDE 20 MG: 20 TABLET ORAL at 04:26

## 2019-07-17 RX ADMIN — QUETIAPINE FUMARATE 12.5 MG: 25 TABLET ORAL at 04:26

## 2019-07-17 RX ADMIN — HEPARIN SODIUM 5000 UNITS: 5000 INJECTION, SOLUTION INTRAVENOUS; SUBCUTANEOUS at 21:49

## 2019-07-17 RX ADMIN — SENNOSIDES AND DOCUSATE SODIUM 2 TABLET: 8.6; 5 TABLET ORAL at 17:04

## 2019-07-17 ASSESSMENT — PAIN SCALES - PAIN ASSESSMENT IN ADVANCED DEMENTIA (PAINAD)
FACIALEXPRESSION: SMILING OR INEXPRESSIVE
BREATHING: NORMAL
BODYLANGUAGE: RELAXED
FACIALEXPRESSION: SMILING OR INEXPRESSIVE
BODYLANGUAGE: RELAXED
CONSOLABILITY: NO NEED TO CONSOLE
TOTALSCORE: 0
BREATHING: NORMAL
CONSOLABILITY: NO NEED TO CONSOLE
TOTALSCORE: 0

## 2019-07-17 NOTE — PROGRESS NOTES
2 RN skin check complete with MARIBELL Valdez  Devices in place: oxygen tubing and heel float boots  Skin assessed under devices Yes.  Confirmed pressure ulcers found on  N/A.   New potential pressure ulcers noted on sacrum. And right upper arm wound.  picture taken and wound consult ordered by day shift.      The following skin issues noted:   Noted pinkish blanching, bilateral back of the ears. Dressing of skin tear noted on right posterior upper arm.  Large area of bruising noted BUE. Pinkish to red blanching spot on the back.  Skin tear on upper left arm and dressing is C/D/I. Skin tag and bruising noted on abdominal area. Left upper chest  pacemaker site dressing is C/D/I. Scabs noted on BLE extremities. Dusky BLE noted. Heels are pinkish red and blanching. Bilateral wrist is red and blanching. Mepilex applied for protection.   The following interventions in place; Q2 turns. Heel float boots, linen changes with incontinence. Keep pt dry and clean. Waffle mattress reinflated.

## 2019-07-17 NOTE — PROGRESS NOTES
Assumed care of pt at shift change. A/Ox4, discussed plan of care. Pt on room air. Soft bilateral wrist restraint  In place. Pt was restless and yelling for help. RN kept reoriented pt and talking with pt reassuring pt and holding her hands.  Trazodone given to help pt get some rest. Pt fell into sleep around 22:13.  All needs met at this time. Bed in lowest position, treaded socks on, personal belongings and call light within reach, instructed to call for any assistance, hourly rounding in place

## 2019-07-17 NOTE — CARE PLAN
Problem: Safety  Goal: Will remain free from falls  Outcome: PROGRESSING AS EXPECTED  Room next to nurses station. Bed alarm, restraints assessed and adjusted, staff assisted transfers to chair    Problem: Discharge Barriers/Planning  Goal: Patient's continuum of care needs will be met  Outcome: PROGRESSING AS EXPECTED  Pt up to chair for meals and throughout day as tolerated  Pacemaker incision site CDI, healing

## 2019-07-17 NOTE — PROGRESS NOTES
"Pt up in chair eating breakfast. Oriented to self and asking for help frequently, however when asked what she would like help with she replies, \"nothing\". Distraction and activity at nurses station table helps occupy and calm pt. Dressing CDI, pacemaker incision site continues to improve with healing, appears almost completely healed.  "

## 2019-07-17 NOTE — PROGRESS NOTES
Hospital Medicine Daily Progress Note    Date of Service  7/17/2019    Chief Complaint  Bradycardia and syncope.    Hospital Course   This is a 89-year-old female with PMH significant for CKD 3, hypothyroidism, dementia and [place] who transferred here from outside facility for AV block thought to be Mobitz 2.  She underwent PPM placement on 6/21/2019 by cardiology.  Her post procedure course was complicated by ongoing confusion and attempts to pick at her PPM site incision requiring restraints.      Interval Problem Update  Sleeping, very hard of hearing. Denies any pain. No overnight events. Has been picking at skin when restraints not on, doing okay when up at common table.     Consultants/Specialty  Cardiology -reconsulted to evaluated pacemaker pocket incision.    Code Status  DNAR/DNI    Disposition  Her currently Senior Living Facility is not comfortable taking her back until her PPM site has healed.     SNF referral placed.    Review of Systems  Review of Systems   Unable to perform ROS: Dementia (and very hard of hearing)     Physical Exam  Temp:  [36.1 °C (96.9 °F)-36.7 °C (98 °F)] 36.2 °C (97.1 °F)  Pulse:  [60-63] 63  Resp:  [15-18] 15  BP: (120-159)/(45-68) 120/56  SpO2:  [92 %-100 %] 98 %    Physical Exam   Constitutional: Vital signs are normal. She appears well-developed. She appears listless. She is sleeping and uncooperative. She is easily aroused. No distress.   Frail, hard of hearing.    HENT:   Head: Normocephalic and atraumatic.   Right Ear: Decreased hearing is noted.   Left Ear: Decreased hearing is noted.   Mouth/Throat: Oropharynx is clear and moist.   Eyes: EOM are normal. No scleral icterus.   Neck: Normal range of motion. No tracheal deviation present.   Cardiovascular: Normal rate and intact distal pulses.  Exam reveals no gallop and no friction rub.    Murmur heard.   Systolic murmur is present   PPM site with dressing CDI without drainage, unchanged   Pulmonary/Chest: Effort normal. No  stridor. She has decreased breath sounds. She has no wheezes. She has no rhonchi. She exhibits no tenderness.   Left chest pacemaker pocket still not healing, no signs of infection or purulent secretion   Abdominal: Soft. Bowel sounds are normal. She exhibits no distension. There is no tenderness.   Musculoskeletal: Normal range of motion. She exhibits no edema or tenderness.   Generalized weakness   Neurological: She is easily aroused. She appears listless. She is disoriented. She displays atrophy. No cranial nerve deficit.   Skin: Skin is warm. No erythema.   Left chest wound pocket no signs of infection but not healed   Psychiatric: She has a normal mood and affect. Judgment normal. Her speech is delayed. She is slowed. Cognition and memory are impaired.   Vitals reviewed.      Fluids    Intake/Output Summary (Last 24 hours) at 07/17/19 0756  Last data filed at 07/16/19 2100   Gross per 24 hour   Intake              220 ml   Output                0 ml   Net              220 ml       Laboratory  Recent Labs      07/17/19   0400   WBC  7.2   RBC  4.78   HEMOGLOBIN  14.6   HEMATOCRIT  44.6   MCV  93.3   MCH  30.5   MCHC  32.7*   RDW  48.9   PLATELETCT  250   MPV  10.4     Recent Labs      07/16/19   0310  07/17/19   0400   SODIUM  138  138   POTASSIUM  4.8  4.4   CHLORIDE  103  104   CO2  29  26   GLUCOSE  96  88   BUN  21  18   CREATININE  0.97  0.84   CALCIUM  9.1  9.0                   Imaging  DX-CHEST-PORTABLE (1 VIEW)   Final Result      1.  Blunting of the costophrenic angles likely represent small pleural effusions. Adjacent airspace opacification may be secondary to atelectasis or pneumonia.      2.  Increased perihilar interstitial markings are suggestive of mild edema.      DX-CHEST-PORTABLE (1 VIEW)   Final Result      1.  Increase in size of small bilateral pleural effusions.      2.  Unchanged cardiomegaly.      3.  Persistent diffuse interstitial opacity which represent interstitial edema, pneumonia,  or fibrosis.      DX-CHEST-PORTABLE (1 VIEW)   Final Result      1.  Mild bibasilar atelectasis/scarring and trace right effusion.   2.  Unipolar pacer lead projects appropriately.      DX-CHEST-LIMITED (1 VIEW)   Final Result      1.  There is slight improvement in changes of interstitial edema with trace amount of pleural fluid.   2.  Pacemaker lead projects over the cardiac silhouette. There is no visible pneumothorax.      DX-CHEST-PORTABLE (1 VIEW)   Final Result         1.  Pulmonary edema and/or infiltrates are identified, which appear somewhat increased since the prior exam.   2.  Atherosclerosis      DX-CHEST-PORTABLE (1 VIEW)   Final Result      Mild worsening reticular opacification may indicate edema      New left pacer with no evidence of pneumothorax      EC-ECHOCARDIOGRAM COMPLETE W/O CONT   Final Result      DX-CHEST-PORTABLE (1 VIEW)   Final Result      No acute cardiac or pulmonary abnormality is noted.      CL-PERMANENT PACEMAKER INSERTION    (Results Pending)        Assessment/Plan  * Late onset Alzheimer's disease with behavioral disturbance- (present on admission)   Assessment & Plan    -Patient has severe underlying dementia with behavioral disturbance.  -she is very [place] likely contributing to her delusions and behaviors  -Continue celexa and seroquel  -continue with supervision, restraints only if absolutely needed  -PRN haldol     Cellulitis   Assessment & Plan    Currently resolved. Afebrile, no leukocytosis  -PPM site left chest incision still not completely healed still require coverage to prevent infection  -completed 5 days of empiric ABX coverage     AV block- (present on admission)   Assessment & Plan    Resolved.   -s/p PPM placement 6/21. HR 60's  -Fall Precautions     S/P placement of cardiac pacemaker   Assessment & Plan    Placed 6/21/2019  - s/p 5 day course of ABX  -wound culture negative  -will need to remain in the hospital until pacemaker site is completely healed.       Impaired skin integrity- (present on admission)   Assessment & Plan    -skin is very thin & frail with scattered skin tears, bruises  -nursing skin care protocol  -continue to encourage PO intake. Supplements.     Pulmonary edema   Assessment & Plan    Increase in size of small bilateral pleural effusions on x-ray. BNP elevated. Echo stable.  - S/P 1 x dose of lasix  - Wean o2 as tolerated, currently on 3L NC  - repeat CXR tomorrow     Generalized weakness- (present on admission)   Assessment & Plan    -PT/OT   -likely will need Home Health versus SNF placement     Normocytic anemia- (present on admission)   Assessment & Plan    -Iron, vitamin B12, and folate all WNL  -Follow labs     Syncopal episodes- (present on admission)   Assessment & Plan    Resolved.   -due to AV block, now s/p PPM     Stage 3 chronic kidney disease (HCC)- (present on admission)   Assessment & Plan    Chronic, at baseline  -Avoid nephrotoxins  -Renally dose medications as indicated       Hypothyroidism- (present on admission)   Assessment & Plan    -TSH low/free t4 elevated  -synthroid decreased from 175 to 150 on 6/22  -TSH in 4-6 weeks, outpatient follow up           VTE prophylaxis: Heparin    Rupa Espana M.D.       Current Facility-Administered Medications:   •  QUEtiapine (SEROQUEL) tablet 12.5 mg, 12.5 mg, Oral, BID, Juaquin Bailey A.P.R.NStar, 12.5 mg at 07/17/19 0426  •  haloperidol lactate (HALDOL) injection 1 mg, 1 mg, Intramuscular, BID PRN, Lizzy Carcamo M.D., 1 mg at 07/11/19 0055  •  levothyroxine (SYNTHROID) tablet 150 mcg, 150 mcg, Oral, AM ES, Nichole Heller M.D., 150 mcg at 07/17/19 0426  •  aspirin (ASA) chewable tab 81 mg, 81 mg, Oral, DAILY, Leanne Poe M.D., 81 mg at 07/17/19 0426  •  citalopram (CELEXA) tablet 20 mg, 20 mg, Oral, DAILY, Leanne Poe M.D., 20 mg at 07/17/19 0426  •  traZODone (DESYREL) tablet 50 mg, 50 mg, Oral, HS PRN, Leanne Poe M.D., 50 mg at 07/16/19 0802  •   senna-docusate (PERICOLACE or SENOKOT S) 8.6-50 MG per tablet 2 Tab, 2 Tab, Oral, BID, 2 Tab at 07/17/19 0426 **AND** polyethylene glycol/lytes (MIRALAX) PACKET 1 Packet, 1 Packet, Oral, QDAY PRN **AND** magnesium hydroxide (MILK OF MAGNESIA) suspension 30 mL, 30 mL, Oral, QDAY PRN **AND** bisacodyl (DULCOLAX) suppository 10 mg, 10 mg, Rectal, QDAY PRN, Leanne Poe M.D.  •  Respiratory Care per Protocol, , Nebulization, Continuous RT, Leanne Poe M.D.  •  heparin injection 5,000 Units, 5,000 Units, Subcutaneous, Q8HRS, Leanne Poe M.D., 5,000 Units at 07/17/19 3968  •  acetaminophen (TYLENOL) tablet 650 mg, 650 mg, Oral, Q6HRS PRN, Leanne Poe M.D., 650 mg at 07/15/19 1128

## 2019-07-17 NOTE — PROGRESS NOTES
Attempts to remove restraints when pt is unattended have been unsuccessful as pt continues to remove dressing and scratch at skin and wounds reopening tears.

## 2019-07-18 ENCOUNTER — APPOINTMENT (OUTPATIENT)
Dept: RADIOLOGY | Facility: MEDICAL CENTER | Age: 84
DRG: 243 | End: 2019-07-18
Attending: HOSPITALIST
Payer: MEDICARE

## 2019-07-18 PROCEDURE — 700102 HCHG RX REV CODE 250 W/ 637 OVERRIDE(OP): Performed by: INTERNAL MEDICINE

## 2019-07-18 PROCEDURE — 700111 HCHG RX REV CODE 636 W/ 250 OVERRIDE (IP): Performed by: INTERNAL MEDICINE

## 2019-07-18 PROCEDURE — A9270 NON-COVERED ITEM OR SERVICE: HCPCS | Performed by: NURSE PRACTITIONER

## 2019-07-18 PROCEDURE — 700102 HCHG RX REV CODE 250 W/ 637 OVERRIDE(OP): Performed by: NURSE PRACTITIONER

## 2019-07-18 PROCEDURE — A9270 NON-COVERED ITEM OR SERVICE: HCPCS | Performed by: INTERNAL MEDICINE

## 2019-07-18 PROCEDURE — 770006 HCHG ROOM/CARE - MED/SURG/GYN SEMI*

## 2019-07-18 PROCEDURE — 92526 ORAL FUNCTION THERAPY: CPT

## 2019-07-18 PROCEDURE — 71045 X-RAY EXAM CHEST 1 VIEW: CPT

## 2019-07-18 PROCEDURE — 99232 SBSQ HOSP IP/OBS MODERATE 35: CPT | Performed by: HOSPITALIST

## 2019-07-18 RX ADMIN — ASPIRIN 81 MG 81 MG: 81 TABLET ORAL at 06:16

## 2019-07-18 RX ADMIN — QUETIAPINE FUMARATE 12.5 MG: 25 TABLET ORAL at 17:11

## 2019-07-18 RX ADMIN — HEPARIN SODIUM 5000 UNITS: 5000 INJECTION, SOLUTION INTRAVENOUS; SUBCUTANEOUS at 14:11

## 2019-07-18 RX ADMIN — CITALOPRAM HYDROBROMIDE 20 MG: 20 TABLET ORAL at 06:16

## 2019-07-18 RX ADMIN — QUETIAPINE FUMARATE 12.5 MG: 25 TABLET ORAL at 06:17

## 2019-07-18 RX ADMIN — HEPARIN SODIUM 5000 UNITS: 5000 INJECTION, SOLUTION INTRAVENOUS; SUBCUTANEOUS at 06:17

## 2019-07-18 RX ADMIN — LEVOTHYROXINE SODIUM 150 MCG: 75 TABLET ORAL at 06:16

## 2019-07-18 RX ADMIN — SENNOSIDES AND DOCUSATE SODIUM 2 TABLET: 8.6; 5 TABLET ORAL at 06:17

## 2019-07-18 RX ADMIN — SENNOSIDES AND DOCUSATE SODIUM 2 TABLET: 8.6; 5 TABLET ORAL at 17:11

## 2019-07-18 ASSESSMENT — PAIN SCALES - PAIN ASSESSMENT IN ADVANCED DEMENTIA (PAINAD)
FACIALEXPRESSION: SMILING OR INEXPRESSIVE
BODYLANGUAGE: RELAXED
CONSOLABILITY: NO NEED TO CONSOLE
FACIALEXPRESSION: SMILING OR INEXPRESSIVE
BODYLANGUAGE: RELAXED
TOTALSCORE: 0
BREATHING: NORMAL
TOTALSCORE: 0
CONSOLABILITY: NO NEED TO CONSOLE
BREATHING: NORMAL

## 2019-07-18 NOTE — THERAPY
"Speech Language Therapy dysphagia treatment completed.     Functional Status:  Pt was seen for dysphagia tx at lunch, with a dysphagia II/thin liquid meal tray.  Pt sitting up at the RN station, reading a book.  She is following simple written directives and answering simple questions appropriately (using whiteboard).  Pt consumed minimal amounts of soft solids, purees and thins via straw sips, without any overt s/sx of aspiration.  Pt reporting she is not hungry.  Recommend to continue a dysphagia II diet with thin liquids, with direct supervision.  SLP continues to follow.      Recommendations: continue dysphagia II/thins with direct supervision     Plan of Care: Will benefit from Speech Therapy 2 times per week    Post-Acute Therapy: Recommend inpatient transitional care services for continued speech therapy services.      See \"Rehab Therapy-Acute\" Patient Summary Report for complete documentation.     "

## 2019-07-18 NOTE — CARE PLAN
Problem: Psychosocial Needs:  Goal: Level of anxiety will decrease  Outcome: PROGRESSING AS EXPECTED  Distraction, redirection, reassurance, sitting at nurses station, book provided as pt likes to read

## 2019-07-18 NOTE — PROGRESS NOTES
2 RN skin check complete with Anuja RN   Devices in place; oxygen tubing and heel float boots   Skin assessed under devices Yes.   Confirmed pressure ulcers found on N/A.   New potential pressure ulcers noted on N/A    Interventions:mepilex, mepitel dressings, turns, frequent checks for incontinence.       Pt has the following:    Bilateral ears are pink and blanching  L chest at pacemaker site is pink and red and blanching incision looks healed  L upper arm has a large skin tear mepitel in place  R back upper arm has large skin tear  Bilateral arms have generalized bruises  Abdomen has generalized bruising  Bilateral legs have generalized scabs and bruising  Sacrum is red and slow to paty   Bilateral heels are pink and blanching

## 2019-07-18 NOTE — PROGRESS NOTES
2 RN skin check complete with Ricki REYNA   Devices in place; oxygen tubing and heel float boots   Skin assessed under devices Yes.   Confirmed pressure ulcers found on N/A.   New potential pressure ulcers noted on N/A     Interventions:mepilex, mepitel dressings, turns, frequent checks for incontinence.         Pt has the following:    Bilateral ears are pink and blanching  L chest at pacemaker site is pink and red and blanching incision looks healed  L upper arm has a large skin tear mepitel in place  R back upper arm has large skin tear with foam dressing in place  Bilateral arms have generalized bruises  Abdomen has generalized bruising  Bilateral legs have generalized scabs and bruising  Sacrum is red and blanching  Bilateral heels are pink and blanching

## 2019-07-18 NOTE — DISCHARGE PLANNING
LSW received call from [facility]. Pt was accepted. LSW contacted charge nurse to see if pt was medically clear. Pt was taken off restraints at 10am this morning. LSW contacted [facility] and stated pt will be ready in the am. LSW sent email to unit case management to follow up in the morning.

## 2019-07-18 NOTE — DISCHARGE PLANNING
Received Choice form at 1610  Agency/Facility Name: [facility]  Referral sent per Choice form 1159

## 2019-07-18 NOTE — PROGRESS NOTES
Problem: Safety  Goal: Will remain free from falls  Outcome: PROGRESSING AS EXPECTED  Bed alarm in place. Bed in lowest position, treaded socks on, personal belongings and call light within reach, instructed to call for any assistance, hourly rounding in place.     Problem: Psychosocial Needs:  Goal: Level of anxiety will decrease  Outcome: PROGRESSING AS EXPECTED  Constantly reoriented pt. Comforting pt. Therapeutic touch. Pt is sleeping currently

## 2019-07-18 NOTE — PROGRESS NOTES
2 RN skin check complete with MARIBELL Carlos around 02:0  Devices in place: oxygen tubing and heel float boots  Skin assessed under devices Yes.  Confirmed pressure ulcers found on  N/A.   New potential pressure ulcers noted: NA   The following skin issues noted:   Noted pinkish blanching, bilateral back of the ears. Large area of bruising noted BUE. Dressing of skin tear of right posterior upper arm is C/D/I.   Skin tear on upper left arm and dressing is C/D/I. Skin tag and bruising noted on abdominal area. Left upper chest  pacemaker site dressing is C/D/I. Scabs noted on BLE extremities. Dusky BLE noted. Heels are pinkish red and blanching. Bilateral wrist is red and blanching. Mepilex applied for protection.    Dressing change and picture are done around 04:50. Dressing of right shoulder changed. Picture taken and uploaded. Left upper arm dressing changed. Picture taken and uploaded. Pacemaker site dressing changed. Picture of wound taken and uploaded. Sacrum picture taken and uploaded. Mepilex applied. Picture of BLE are taken and uploaded  The following interventions in place; Q2 turns. Heel float boots, linen changes with incontinence. Keep pt dry and clean. Waffle mattress reinflated. Mepilex in place for protection.

## 2019-07-18 NOTE — PROGRESS NOTES
Assumed care of pt at shift change. A/Ox4, discussed plan of care. Pt on room air. Soft bilateral wrist restraint  In place. Pt was restless and yelling for help. RN kept reoriented pt and talking with pt reassuring pt and holding her hands. All needs met at this time. Bed in lowest position, treaded socks on, personal belongings and call light within reach, instructed to call for any assistance, hourly rounding in place

## 2019-07-18 NOTE — PROGRESS NOTES
Pt up at nurses station in wheelchair. Enjoys and remains occupied with books and newspaper.  VSS, no signs of distress, and pacemaker incision site healed with no scabs remaining. Trial pt with no restraints this afternoon and tonight.

## 2019-07-18 NOTE — CARE PLAN
Problem: Discharge Barriers/Planning  Goal: Patient's continuum of care needs will be met  Outcome: PROGRESSING AS EXPECTED  Pacemaker incision site has healed.  Pt up to wheelchair and out at nurses station.   Trial no restraints

## 2019-07-18 NOTE — PROGRESS NOTES
Hospital Medicine Daily Progress Note    Date of Service  7/18/2019    Chief Complaint  Bradycardia and syncope.    Hospital Course   This is a 89-year-old female with PMH significant for CKD 3, hypothyroidism, dementia and [place] who transferred here from outside facility for AV block thought to be Mobitz 2.  She underwent PPM placement on 6/21/2019 by cardiology.  Her post procedure course was complicated by ongoing confusion and attempts to pick at her PPM site incision requiring restraints.      Interval Problem Update  Sleeping, wakes easily. No acute distress and cooperative with examination. Denies any pain. Very hard of hearing.     Consultants/Specialty  Cardiology -reconsulted to evaluated pacemaker pocket incision.    Code Status  DNAR/DNI    Disposition  Her currently Senior Living Facility is not comfortable taking her back until her PPM site has healed.     SNF referral placed.    Review of Systems  Review of Systems   Unable to perform ROS: Dementia (and very hard of hearing)   HENT: Negative for hearing loss.    Cardiovascular: Negative for chest pain.     Physical Exam  Temp:  [36.4 °C (97.6 °F)-36.8 °C (98.2 °F)] 36.6 °C (97.8 °F)  Pulse:  [60-62] 60  Resp:  [14-18] 18  BP: (116-146)/(44-64) 146/44  SpO2:  [90 %-95 %] 95 %    Physical Exam   Constitutional: Vital signs are normal. She appears well-developed. She appears listless. She is sleeping and uncooperative. She is easily aroused. No distress.   Frail, hard of hearing.    HENT:   Head: Normocephalic and atraumatic.   Right Ear: Decreased hearing is noted.   Left Ear: Decreased hearing is noted.   Mouth/Throat: Oropharynx is clear and moist.   Eyes: EOM are normal. No scleral icterus.   Neck: Normal range of motion. No tracheal deviation present.   Cardiovascular: Normal rate and intact distal pulses.  Exam reveals no gallop and no friction rub.    Murmur heard.   Systolic murmur is present   PPM site with dressing CDI without drainage, unchanged    Pulmonary/Chest: Effort normal. No stridor. She has decreased breath sounds. She has no wheezes. She has no rhonchi. She exhibits no tenderness.   Abdominal: Soft. She exhibits no distension. There is no tenderness. There is no guarding.   Musculoskeletal: Normal range of motion. She exhibits no edema or tenderness.   Generalized weakness   Neurological: She is easily aroused. She appears listless. She is disoriented. She displays atrophy. No cranial nerve deficit.   Skin: Skin is warm. No erythema.   Left chest wound pocket no signs of infection, healing well. Skin tears of the upper extremities bilaterally, right is bandaged   Psychiatric: She has a normal mood and affect. Judgment normal. Her speech is delayed. She is slowed. Cognition and memory are impaired.   Vitals reviewed.      Fluids    Intake/Output Summary (Last 24 hours) at 07/18/19 0751  Last data filed at 07/1935   Gross per 24 hour   Intake              280 ml   Output                0 ml   Net              280 ml       Laboratory  Recent Labs      07/17/19   0400   WBC  7.2   RBC  4.78   HEMOGLOBIN  14.6   HEMATOCRIT  44.6   MCV  93.3   MCH  30.5   MCHC  32.7*   RDW  48.9   PLATELETCT  250   MPV  10.4     Recent Labs      07/16/19   0310  07/17/19   0400   SODIUM  138  138   POTASSIUM  4.8  4.4   CHLORIDE  103  104   CO2  29  26   GLUCOSE  96  88   BUN  21  18   CREATININE  0.97  0.84   CALCIUM  9.1  9.0                   Imaging  DX-CHEST-PORTABLE (1 VIEW)   Final Result      1.  Blunting of the costophrenic angles likely represent small pleural effusions. Adjacent airspace opacification may be secondary to atelectasis or pneumonia.      2.  Increased perihilar interstitial markings are suggestive of mild edema.      DX-CHEST-PORTABLE (1 VIEW)   Final Result      1.  Increase in size of small bilateral pleural effusions.      2.  Unchanged cardiomegaly.      3.  Persistent diffuse interstitial opacity which represent interstitial edema,  pneumonia, or fibrosis.      DX-CHEST-PORTABLE (1 VIEW)   Final Result      1.  Mild bibasilar atelectasis/scarring and trace right effusion.   2.  Unipolar pacer lead projects appropriately.      DX-CHEST-LIMITED (1 VIEW)   Final Result      1.  There is slight improvement in changes of interstitial edema with trace amount of pleural fluid.   2.  Pacemaker lead projects over the cardiac silhouette. There is no visible pneumothorax.      DX-CHEST-PORTABLE (1 VIEW)   Final Result         1.  Pulmonary edema and/or infiltrates are identified, which appear somewhat increased since the prior exam.   2.  Atherosclerosis      DX-CHEST-PORTABLE (1 VIEW)   Final Result      Mild worsening reticular opacification may indicate edema      New left pacer with no evidence of pneumothorax      EC-ECHOCARDIOGRAM COMPLETE W/O CONT   Final Result      DX-CHEST-PORTABLE (1 VIEW)   Final Result      No acute cardiac or pulmonary abnormality is noted.      CL-PERMANENT PACEMAKER INSERTION    (Results Pending)        Assessment/Plan  * Late onset Alzheimer's disease with behavioral disturbance- (present on admission)   Assessment & Plan    Patient has severe underlying dementia with behavioral disturbance. Has been calm and pleasant.   -she is very [place] likely contributing to her delusions and behaviors  -Continue celexa and seroquel  -continue with supervision, restraints only if absolutely needed as we will need them off for 24 hours prior to d/c to SNF  -PRN haldol     Cellulitis   Assessment & Plan    Currently resolved. Afebrile, no leukocytosis  -PPM site left chest incision still not completely healed still require coverage to prevent infection  -completed 5 days of empiric ABX coverage     AV block- (present on admission)   Assessment & Plan    Resolved.   -s/p PPM placement 6/21. HR 60's  -Fall Precautions     S/P placement of cardiac pacemaker   Assessment & Plan    Placed 6/21/2019  - s/p 5 day course of ABX  -wound culture  negative     Impaired skin integrity- (present on admission)   Assessment & Plan    -skin is very thin & frail with scattered skin tears, bruises  -nursing skin care protocol  - wound care  -continue to encourage PO intake. Supplements.     Pulmonary edema   Assessment & Plan    Increase in size of small bilateral pleural effusions on x-ray. BNP elevated. Echo stable.  - S/P 1 x dose of lasix  - Wean o2 as tolerated, currently on 3L NC  - repeat CXR today, pending     Generalized weakness- (present on admission)   Assessment & Plan    -continue with PT/OT   -will need SNF prior to returning to [facility]     Normocytic anemia- (present on admission)   Assessment & Plan    -Iron, vitamin B12, and folate all WNL  -Follow labs     Syncopal episodes- (present on admission)   Assessment & Plan    Resolved.   -due to AV block, now s/p PPM     Stage 3 chronic kidney disease (HCC)- (present on admission)   Assessment & Plan    Chronic, at baseline  -Avoid nephrotoxins  -Renally dose medications as indicated     Hypothyroidism- (present on admission)   Assessment & Plan    -TSH low/free t4 elevated  -synthroid decreased from 175 to 150 on 6/22  -TSH in 4-6 weeks, outpatient follow up           VTE prophylaxis: Heparin    Rupa Espana M.D.       Current Facility-Administered Medications:   •  QUEtiapine (SEROQUEL) tablet 12.5 mg, 12.5 mg, Oral, BID, Juaquin Bailey A.P.R.N., 12.5 mg at 07/18/19 0617  •  haloperidol lactate (HALDOL) injection 1 mg, 1 mg, Intramuscular, BID PRN, Lizzy Carcamo M.D., 1 mg at 07/11/19 0055  •  levothyroxine (SYNTHROID) tablet 150 mcg, 150 mcg, Oral, AM ES, Nichole Heller M.D., 150 mcg at 07/18/19 0616  •  aspirin (ASA) chewable tab 81 mg, 81 mg, Oral, DAILY, Leanne Poe M.D., 81 mg at 07/18/19 0616  •  citalopram (CELEXA) tablet 20 mg, 20 mg, Oral, DAILY, Leanne Poe M.D., 20 mg at 07/18/19 0616  •  traZODone (DESYREL) tablet 50 mg, 50 mg, Oral, HS PRN, Leanne Poe M.D., 50  mg at 07/16/19 2119  •  senna-docusate (PERICOLACE or SENOKOT S) 8.6-50 MG per tablet 2 Tab, 2 Tab, Oral, BID, 2 Tab at 07/18/19 0617 **AND** polyethylene glycol/lytes (MIRALAX) PACKET 1 Packet, 1 Packet, Oral, QDAY PRN **AND** magnesium hydroxide (MILK OF MAGNESIA) suspension 30 mL, 30 mL, Oral, QDAY PRN **AND** bisacodyl (DULCOLAX) suppository 10 mg, 10 mg, Rectal, QDAY PRN, Leanne Poe M.D.  •  Respiratory Care per Protocol, , Nebulization, Continuous RT, Leanne Poe M.D.  •  heparin injection 5,000 Units, 5,000 Units, Subcutaneous, Q8HRS, Leanne Poe M.D., 5,000 Units at 07/18/19 0617  •  acetaminophen (TYLENOL) tablet 650 mg, 650 mg, Oral, Q6HRS PRN, Leanne Poe M.D., 650 mg at 07/15/19 2136

## 2019-07-18 NOTE — DISCHARGE PLANNING
"Anticipated Disposition:  SNF    Action:   Met with Pt,she is pleasantly confused. While I was there, she removed her right upper arm dressing. Informed the nurse.   This CM write down \" The doctor would like to send you to a skilled nursing facility after discharge from [facility]. \" She voiced \" Okay\"  then she was unable to give me permission to send referrals to any of the facilities. Placed a phone call to Jarrod Son, He selected [facility], Jarrod asked to try [facility] then if it wont take Pt, will discuss for more SNF options.   Choice form signed and faxed to [facility].           Barriers to Discharge:   Medical clearance.   Wound Healing.   SNF acceptance.       Plan:  Please follow up with [facility] for [facility] acceptance.       "

## 2019-07-19 VITALS
HEIGHT: 62 IN | SYSTOLIC BLOOD PRESSURE: 121 MMHG | WEIGHT: 128.97 LBS | TEMPERATURE: 97.1 F | BODY MASS INDEX: 23.73 KG/M2 | HEART RATE: 60 BPM | RESPIRATION RATE: 18 BRPM | OXYGEN SATURATION: 99 % | DIASTOLIC BLOOD PRESSURE: 57 MMHG

## 2019-07-19 PROBLEM — L03.90 CELLULITIS: Status: RESOLVED | Noted: 2019-07-05 | Resolved: 2019-07-19

## 2019-07-19 PROBLEM — J81.1 PULMONARY EDEMA: Status: RESOLVED | Noted: 2019-07-07 | Resolved: 2019-07-19

## 2019-07-19 PROBLEM — I44.30 AV BLOCK: Status: RESOLVED | Noted: 2019-06-20 | Resolved: 2019-07-19

## 2019-07-19 PROBLEM — R55 SYNCOPAL EPISODES: Status: RESOLVED | Noted: 2019-06-20 | Resolved: 2019-07-19

## 2019-07-19 PROCEDURE — A9270 NON-COVERED ITEM OR SERVICE: HCPCS | Performed by: NURSE PRACTITIONER

## 2019-07-19 PROCEDURE — 700111 HCHG RX REV CODE 636 W/ 250 OVERRIDE (IP): Performed by: INTERNAL MEDICINE

## 2019-07-19 PROCEDURE — A9270 NON-COVERED ITEM OR SERVICE: HCPCS | Performed by: INTERNAL MEDICINE

## 2019-07-19 PROCEDURE — 99239 HOSP IP/OBS DSCHRG MGMT >30: CPT | Performed by: HOSPITALIST

## 2019-07-19 PROCEDURE — 700102 HCHG RX REV CODE 250 W/ 637 OVERRIDE(OP): Performed by: INTERNAL MEDICINE

## 2019-07-19 PROCEDURE — 700102 HCHG RX REV CODE 250 W/ 637 OVERRIDE(OP): Performed by: NURSE PRACTITIONER

## 2019-07-19 RX ORDER — TRAZODONE HYDROCHLORIDE 50 MG/1
50 TABLET ORAL NIGHTLY PRN
Qty: 30 TAB | Refills: 3
Start: 2019-07-19

## 2019-07-19 RX ORDER — HEPARIN SODIUM 5000 [USP'U]/ML
5000 INJECTION, SOLUTION INTRAVENOUS; SUBCUTANEOUS EVERY 8 HOURS
Refills: 0
Start: 2019-07-19 | End: 2019-07-30

## 2019-07-19 RX ORDER — LEVOTHYROXINE SODIUM 0.15 MG/1
150 TABLET ORAL
Qty: 30 TAB
Start: 2019-07-20

## 2019-07-19 RX ORDER — QUETIAPINE FUMARATE 25 MG/1
12.5 TABLET, FILM COATED ORAL 2 TIMES DAILY
Qty: 3 TAB | Refills: 0 | Status: SHIPPED | OUTPATIENT
Start: 2019-07-19 | End: 2019-07-22

## 2019-07-19 RX ORDER — POLYETHYLENE GLYCOL 3350 17 G/17G
17 POWDER, FOR SOLUTION ORAL
Refills: 3
Start: 2019-07-19 | End: 2019-07-30

## 2019-07-19 RX ADMIN — QUETIAPINE FUMARATE 12.5 MG: 25 TABLET ORAL at 04:08

## 2019-07-19 RX ADMIN — CITALOPRAM HYDROBROMIDE 20 MG: 20 TABLET ORAL at 04:08

## 2019-07-19 RX ADMIN — ASPIRIN 81 MG 81 MG: 81 TABLET ORAL at 04:08

## 2019-07-19 RX ADMIN — SENNOSIDES AND DOCUSATE SODIUM 2 TABLET: 8.6; 5 TABLET ORAL at 04:08

## 2019-07-19 RX ADMIN — LEVOTHYROXINE SODIUM 150 MCG: 75 TABLET ORAL at 04:08

## 2019-07-19 RX ADMIN — HEPARIN SODIUM 5000 UNITS: 5000 INJECTION, SOLUTION INTRAVENOUS; SUBCUTANEOUS at 04:08

## 2019-07-19 ASSESSMENT — PAIN SCALES - PAIN ASSESSMENT IN ADVANCED DEMENTIA (PAINAD)
BREATHING: NORMAL
FACIALEXPRESSION: SMILING OR INEXPRESSIVE
FACIALEXPRESSION: SMILING OR INEXPRESSIVE
TOTALSCORE: 0
CONSOLABILITY: NO NEED TO CONSOLE
BREATHING: NORMAL
BODYLANGUAGE: RELAXED
CONSOLABILITY: NO NEED TO CONSOLE
TOTALSCORE: 0
BODYLANGUAGE: RELAXED

## 2019-07-19 NOTE — PROGRESS NOTES
2 RN skin check complete with RN:_______________________  Devices in place; oxygen tubing and heel float boots   Skin assessed under devices Yes.   Confirmed pressure ulcers found on N/A.   New potential pressure ulcers noted on N/A     Interventions:mepilex, mepitel dressings, turns, frequent checks for incontinence.         Pt has the following:    Bilateral ears are pink and blanching  L chest at pacemaker site is pink and red and blanching incision looks healed  L upper arm has a large skin tear mepitel in place  R back upper arm has large skin tear with foam dressing in place  Bilateral arms have generalized bruises  Abdomen has generalized bruising  Bilateral legs have generalized scabs and bruising  Sacrum is red and blanching  Bilateral heels are pink and blanching

## 2019-07-19 NOTE — PROGRESS NOTES
2 RN skin check complete with CRN:_[email or web address]  Devices in place; oxygen tubing and heel float boots   Skin assessed under devices Yes.   Confirmed pressure ulcers found on N/A.   New potential pressure ulcers noted on N/A     Interventions:mepilex, mepitel dressings, turns, frequent checks for incontinence.         Pt has the following:    Bilateral ears are pink and blanching  L chest at pacemaker site is pink and red and blanching incision looks healed  L upper arm has a large skin tear mepitel in place  R back upper arm has large skin tear with foam dressing in place  Bilateral arms have generalized bruises  Abdomen has generalized bruising  Bilateral legs have generalized scabs and bruising  Sacrum is red and blanching  Bilateral heels are pink and blanching

## 2019-07-19 NOTE — DISCHARGE PLANNING
Spoke to Leyda at [facility].  Transport is scheduled for today at 1300 going to [facility].

## 2019-07-19 NOTE — CARE PLAN
Problem: Safety  Goal: Will remain free from injury  Outcome: PROGRESSING AS EXPECTED  FALL PRECAUTIONS ONGOING WITH HOURLY ROUNDING. NO REPORTS OF FALL/INJURY DURING DAY SHIFT PER DAY SHIFT RN

## 2019-07-19 NOTE — DISCHARGE SUMMARY
Discharge Summary    CHIEF COMPLAINT ON ADMISSION  No chief complaint on file.      Reason for Admission  Type 2, Mobitz II AVB     CODE STATUS  DNAR/DNI    HPI & HOSPITAL COURSE  This is a 89 y.o. female from an assisted living facility with CKD stage III, hypothyroidism, dementia and hearing impairment here with syncope.  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  Cardiology was consulted and a permanent pacemaker was placed on 6/21.  Her postop course was complicated by delirium superimposed on her dementia and attempts to pick at her pacemaker site, which required restraints.  Patient has very fragile skin with multiple skin tears.  Cardiology reevaluated as there is concern given proximity of skin tear to the pacemaker site.  She completed 5-day course of doxycycline and Augmentin.  She was hospitalized until the pacemaker site incision had healed and she was no longer in restraints for over 24 hours.  Wound care evaluated and she would benefit from continued wound checks and bandaging to keep her from picking at her skin and causing superimposed infections.  Delirium resolved and she responded well to Seroquel.  She did not require PRN Haldol as she was not agitated.  Now at her baseline with dementia.    Therefore, she is discharged in good and stable condition to skilled nursing facility.    The patient met 2-midnight criteria for an inpatient stay at the time of discharge.      FOLLOW UP ITEMS POST DISCHARGE  Please follow-up with your PCP.  Will need follow-up on TSH as her Synthroid dose was reduced.    Cardiology follow-up in pacemaker clinic.    DISCHARGE DIAGNOSES  Principal Problem:    Late onset Alzheimer's disease with behavioral disturbance POA: Yes  Active Problems:    S/P placement of cardiac pacemaker POA: No    Hypothyroidism POA: Yes    Stage 3 chronic kidney disease (HCC) POA: Yes    Normocytic anemia POA: Yes    Generalized weakness POA: Yes    Impaired skin integrity POA: Yes  Resolved  Problems:    AV block POA: Yes    Cellulitis POA: No    Syncopal episodes POA: Yes    Pulmonary edema POA: Unknown      FOLLOW UP  Future Appointments  Date Time Provider Department Center   7/30/2019 2:40 PM SALINAS Viramontes None   7/30/2019 3:00 PM SALINAS Viramontes None     No follow-up provider specified.    MEDICATIONS ON DISCHARGE     Medication List      START taking these medications      Instructions   heparin 5000 UNIT/ML Soln   Inject 1 mL as instructed every 8 hours.  Dose:  5000 Units     polyethylene glycol/lytes Pack  Commonly known as:  MIRALAX   Take 1 Packet by mouth 1 time daily as needed.  Dose:  17 g     QUEtiapine 25 MG Tabs  Commonly known as:  SEROQUEL   Take 0.5 Tabs by mouth 2 Times a Day for 3 days.  Dose:  12.5 mg        CHANGE how you take these medications      Instructions   levothyroxine 150 MCG Tabs  Start taking on:  7/20/2019  What changed:  · medication strength  · how much to take  Commonly known as:  SYNTHROID   Take 1 Tab by mouth Every morning on an empty stomach.  Dose:  150 mcg     traZODone 50 MG Tabs  What changed:  · when to take this  · reasons to take this  Commonly known as:  DESYREL   Take 1 Tab by mouth at bedtime as needed for Sleep.  Dose:  50 mg        CONTINUE taking these medications      Instructions   aspirin 81 MG Chew chewable tablet  Commonly known as:  ASA   Take 81 mg by mouth every day.  Dose:  81 mg     citalopram 20 MG Tabs  Commonly known as:  CELEXA   Take 20 mg by mouth every day.  Dose:  20 mg        STOP taking these medications    enoxaparin 30 MG/0.3ML Soln inj  Commonly known as:  LOVENOX     risperiDONE 0.5 MG Tabs  Commonly known as:  RISPERDAL            Allergies  Allergies   Allergen Reactions   • Sulfa Drugs Itching       DIET  Orders Placed This Encounter   Procedures   • Diet Order Regular     Standing Status:   Standing     Number of Occurrences:   1     Order Specific Question:   Diet:     Answer:   Regular [1]     Order  Specific Question:   Texture/Fiber modifications:     Answer:   Dysphagia 2(Pureed/Chopped)specify fluid consistency(question 6) [2]     Order Specific Question:   Consistency/Fluid modifications:     Answer:   Thin Liquids [3]     Order Specific Question:   Miscellaneous modifications:     Answer:   SLP - 1:1 Supervision by Nursing [21]       ACTIVITY  As tolerated and directed by skilled nursing.  Weight bearing as tolerated    LINES, DRAINS, AND WOUNDS  This is an automated list. Peripheral IVs will be removed prior to discharge.       Wound 06/20/19 Traumatic Head Laceration with Staples (Active)   Wound Image     7/18/2019  8:00 PM   Site Assessment Clean;Dry;Intact 7/18/2019  8:00 PM   Sandee-wound Assessment Clean;Dry;Intact 7/18/2019  8:00 PM   Margins Attached edges 7/18/2019  8:00 PM   Closure Staples 7/18/2019  8:00 PM   Drainage Amount None 7/18/2019  8:00 PM   Dressing Options Open to Air 7/18/2019  8:00 PM   Dressing Cleansing/Solutions Not Applicable 7/18/2019  8:00 PM   Staples Removed Intact Yes 7/9/2019  9:00 AM       Wound 06/26/19 Skin Tear Chest neck just above midline clavicle, over pacemaker (Active)   Site Assessment IRENE;[place] 7/18/2019  8:00 PM   Sandee-wound Assessment Red;Purple;Pink;Pale 7/18/2019  8:00 PM   Margins Attached edges 7/18/2019  8:00 PM   Wound Length (cm) 1 cm 6/28/2019  2:00 PM   Wound Width (cm) 3 cm 6/28/2019  2:00 PM   Wound Depth (cm) 0.2 cm 6/28/2019  2:00 PM   Wound Surface Area (cm^2) 3 cm^2 6/28/2019  2:00 PM   Tunneling 0 cm 6/28/2019  2:00 PM   Undermining 0 cm 6/28/2019  2:00 PM   Closure None 7/18/2019  8:00 PM   Drainage Amount Scant 7/18/2019  8:00 PM   Drainage Description Serosanguineous 7/18/2019  8:00 PM   Non-staged Wound Description Partial thickness 7/18/2019  8:00 PM   Treatments Cleansed;Site care 7/13/2019 10:00 PM   Cleansing Normal Saline Irrigation 7/18/2019  8:00 PM   Periwound Protectant Not Applicable 7/18/2019  8:00 PM   Dressing Options  Nonadherent Contact Layer;Adhesive Foam 7/18/2019  8:00 PM   Dressing Cleansing/Solutions Not Applicable 7/18/2019  8:00 PM   Dressing Changed Changed 7/18/2019  4:00 AM   Dressing Status Clean;Dry;Intact 7/18/2019  8:00 PM   Dressing Change Frequency Every 48 hrs 7/18/2019  8:00 PM   NEXT Dressing Change  07/20/19 7/18/2019  9:27 AM   NEXT Weekly Photo (Inpatient Only) 07/24/19 7/18/2019  4:00 AM   WOUND NURSE ONLY - Odor None 6/28/2019  2:00 PM   WOUND NURSE ONLY - Exposed Structures None 6/28/2019  2:00 PM   WOUND NURSE ONLY - Tissue Type and Percentage 100% red 6/28/2019  2:00 PM   WOUND NURSE ONLY - Time Spent with Patient (mins) 45 6/28/2019  2:00 PM       Wound 07/15/19 Skin Tear Arm open tear (Active)   Site Assessment Edema;Fragile;Red 7/18/2019  8:00 PM   Sandee-wound Assessment Fragile;Purple;Red 7/18/2019  8:00 PM   Drainage Amount Scant 7/18/2019  8:00 PM   Drainage Description Serosanguineous 7/18/2019  8:00 PM   Treatments Cleansed 7/15/2019  8:26 PM   Cleansing Normal Saline Irrigation 7/18/2019  8:00 PM   Dressing Options Nonadherent Contact Layer;Nonadhesive Foam 7/18/2019  8:00 PM   Dressing Changed Changed 7/18/2019  4:00 AM   Dressing Status Clean;Dry;Intact 7/18/2019  8:00 PM   NEXT Weekly Photo (Inpatient Only) 07/24/19 7/18/2019  4:00 AM       Wound 07/15/19 Skin Tear Arm (Active)   Site Assessment Dark edges;Granulation tissue;Brown 7/18/2019  8:00 PM   Sandee-wound Assessment Fragile;Red;Purple 7/18/2019  8:00 PM   Drainage Amount Scant 7/18/2019  8:00 PM   Dressing Options Transparent Film 7/18/2019  8:00 PM   Dressing Changed Changed 7/18/2019  4:00 AM   Dressing Status Intact;Dry;Clean 7/18/2019  8:00 PM   NEXT Weekly Photo (Inpatient Only) 07/24/19 7/18/2019  4:00 AM                  MENTAL STATUS ON TRANSFER  Level of Consciousness: Alert  Orientation : Disoriented to Time, Disoriented to Person, Disoriented to Event  Speech: Speech  Clear    CONSULTATIONS  Cardiology    PROCEDURES  DX-CHEST-LIMITED (1 VIEW)   Final Result      Small right pleural effusion with overlying atelectasis/consolidation.      Minimal left atelectasis.      Atherosclerotic plaque.      Minimal interstitial prominence may represent minimal edema.      DX-CHEST-PORTABLE (1 VIEW)   Final Result      1.  Blunting of the costophrenic angles likely represent small pleural effusions. Adjacent airspace opacification may be secondary to atelectasis or pneumonia.      2.  Increased perihilar interstitial markings are suggestive of mild edema.      DX-CHEST-PORTABLE (1 VIEW)   Final Result      1.  Increase in size of small bilateral pleural effusions.      2.  Unchanged cardiomegaly.      3.  Persistent diffuse interstitial opacity which represent interstitial edema, pneumonia, or fibrosis.      DX-CHEST-PORTABLE (1 VIEW)   Final Result      1.  Mild bibasilar atelectasis/scarring and trace right effusion.   2.  Unipolar pacer lead projects appropriately.      DX-CHEST-LIMITED (1 VIEW)   Final Result      1.  There is slight improvement in changes of interstitial edema with trace amount of pleural fluid.   2.  Pacemaker lead projects over the cardiac silhouette. There is no visible pneumothorax.      DX-CHEST-PORTABLE (1 VIEW)   Final Result         1.  Pulmonary edema and/or infiltrates are identified, which appear somewhat increased since the prior exam.   2.  Atherosclerosis      DX-CHEST-PORTABLE (1 VIEW)   Final Result      Mild worsening reticular opacification may indicate edema      New left pacer with no evidence of pneumothorax      EC-ECHOCARDIOGRAM COMPLETE W/O CONT   Final Result      DX-CHEST-PORTABLE (1 VIEW)   Final Result      No acute cardiac or pulmonary abnormality is noted.      CL-PERMANENT PACEMAKER INSERTION    (Results Pending)     Permanent Pacemaker Implantation: Pulse generator is a [employer]ad[employer] model AC0141  Serial # [id number]    LABORATORY  Lab Results    Component Value Date    SODIUM 138 07/17/2019    POTASSIUM 4.4 07/17/2019    CHLORIDE 104 07/17/2019    CO2 26 07/17/2019    GLUCOSE 88 07/17/2019    BUN 18 07/17/2019    CREATININE 0.84 07/17/2019    CREATININE 1.2 05/31/2005        Lab Results   Component Value Date    WBC 7.2 07/17/2019    HEMOGLOBIN 14.6 07/17/2019    HEMATOCRIT 44.6 07/17/2019    PLATELETCT 250 07/17/2019        Total time of the discharge process exceeds 34 minutes.

## 2019-07-19 NOTE — DISCHARGE INSTRUCTIONS
Discharge Instructions    Discharged to other by medical transportation with escort. Discharged via wheelchair, hospital escort: Yes.  Special equipment needed: Not Applicable    Be sure to schedule a follow-up appointment with your primary care doctor or any specialists as instructed.     Discharge Plan:   Diet Plan: Discussed  Activity Level: Discussed  Confirmed Follow up Appointment: Appointment Scheduled  Confirmed Symptoms Management: Discussed  Medication Reconciliation Updated: Yes  Influenza Vaccine Indication: Not indicated: Previously immunized this influenza season and > 8 years of age    I understand that a diet low in cholesterol, fat, and sodium is recommended for good health. Unless I have been given specific instructions below for another diet, I accept this instruction as my diet prescription.   Other diet: Regular Dysphagia 2 thin liquids      · Is patient discharged on Warfarin / Coumadin?   No     Depression / Suicide Risk    As you are discharged from this [facility] facility, it is important to learn how to keep safe from harming yourself.    Recognize the warning signs:  · Abrupt changes in personality, positive or negative- including increase in energy   · Giving away possessions  · Change in eating patterns- significant weight changes-  positive or negative  · Change in sleeping patterns- unable to sleep or sleeping all the time   · Unwillingness or inability to communicate  · Depression  · Unusual sadness, discouragement and loneliness  · Talk of wanting to die  · Neglect of personal appearance   · Rebelliousness- reckless behavior  · Withdrawal from people/activities they love  · Confusion- inability to concentrate     If you or a loved one observes any of these behaviors or has concerns about self-harm, here's what you can do:  · Talk about it- your feelings and reasons for harming yourself  · Remove any means that you might use to hurt yourself (examples: pills, rope, extension  cords, firearm)  · Get professional help from the community (Mental Health, Substance Abuse, psychological counseling)  · Do not be alone:Call your Safe Contact- someone whom you trust who will be there for you.  · Call your local CRISIS HOTLINE 494-2034 or 856-263-5990  · Call your local Children's Mobile Crisis Response Team [place] (858) 375-9856 or www.[employer]  · Call the toll free National Suicide Prevention Hotlines   · National Suicide Prevention Lifeline 926-970-OHQR (7846)  · [employer] [place] Hope Line Network 800-SUICIDE (397-7293)    Discharge Instructions    Discharged to home by car with relative. Discharged via wheelchair, hospital escort: Yes.  Special equipment needed: Not Applicable    Be sure to schedule a follow-up appointment with your primary care doctor or any specialists as instructed.     Discharge Plan:   Influenza Vaccine Indication: Not indicated: Previously immunized this influenza season and > 8 years of age    I understand that a diet low in cholesterol, fat, and sodium is recommended for good health. Unless I have been given specific instructions below for another diet, I accept this instruction as my diet prescription.   Other diet: Cardiac    Special Instructions:   Discharge Instructions per Lizzy Carcamo M.D.    F/u with EP and cards in 1-2 weeks    DIET: cardiac    ACTIVITY: as recom by EP    DIAGNOSIS: AV block, s/p pacer    Return to ER if chest pain or fever    · Is patient discharged on Warfarin / Coumadin?   No         Pacemaker Implantation, Care After  Refer to this sheet over the next few weeks. These instructions provide you with information on caring for yourself after the procedure. Your health care provider may also give you more specific instructions. Your treatment has been planned according to current medical practices, but problems sometimes occur. Call your health care provider if you have any problems or questions regarding your pacemaker.   WHAT TO EXPECT  AFTER THE PROCEDURE  · You may feel pain. Some pain is normal. It may last a few days.  · A slight bump may be seen over the skin where the device was placed. Sometimes, it is possible to feel the device under the skin. This is normal.  · In the months and years afterward, your health care provider will check the device, the leads, and the battery every few months. Eventually, when the battery is low, the device will be replaced.  HOME CARE INSTRUCTIONS  Medicines  · Take medicines only as directed by your health care provider.  · If you were prescribed an antibiotic medicine, finish it all even if you start to feel better.  · Do not take any other medicines without asking your health care provider first. Some medicines, including certain painkillers, can cause bleeding in your stomach after surgery.  Wound Care  · Do not remove the bandage on your chest until directed to do so by your health care provider.  · After your bandage is removed, you may see pieces of tape called skin adhesive strips over the area where the cut was made (incision site). Let them fall off on their own.  · Check the incision site every day to make sure it is not infected, bleeding, or starting to pull apart.  · Do not use lotions or ointments near the incision site unless directed to do so.  · Keep the incision area clean and dry for 2-3 days after the procedure or as directed by your health care provider. It takes several weeks for the incision site to completely heal.  · Do not take baths, swim, or use a hot tub until your health care provider approves.  Activities  · Try to walk a little every day. Exercising is important after this procedure. It is also important to use your shoulder on the side of the pacemaker in daily tasks that do not require exaggerated motion.  · Avoid sudden jerking, pulling, or chopping movements that pull your upper arm far away from your body for at least 6 weeks.  · Do not lift your upper arm above your  shoulders for at least 6 weeks. This means no tennis, golf, or swimming for this period of time. If you sleep with the arm above your head, use a restraint to prevent this from happening as you sleep.  · You may go back to work when your health care provider says it is okay. Check with your health care provider before you start to drive or play sports.  Other Instructions  · Follow diet instructions if they were provided. You should be able to eat what you usually do right away, but you may need to limit your salt intake.  · Weigh yourself every day. If you suddenly gain weight, fluid may be building up in your body.  · Always carry your pacemaker identification card with you. The card should list the implant date, device model, and . Consider wearing a medical alert bracelet or necklace.  · Tell all health care providers that you have a pacemaker. This may prevent them from giving you a magnetic resource imaging scan (MRI) because of the strong magnets used during that test.  · If you must pass through a metal detector, quickly walk through it. Do not stop under the detector or stand near it.  · Avoid places or objects with a strong electric or magnetic field, including:  ¨ Airport security diehl. When at the airport, let officials know you have a pacemaker. Your ID card will let you be checked in a way that is safe for you and that will not damage your pacemaker. Also, do not let a security person wave a magnetic wand near your pacemaker. That can make it stop working.  ¨ Power plants.  ¨ Large electrical generators.  ¨ Radiofrequency transmission towers, such as cell phone and radio towers.  · Do not use amateur (ham) radio equipment or electric (arc) welding torches. Some devices are safe to use if held at least 1 foot from your pacemaker. These include power tools, lawn mowers, and speakers. If you are unsure of whether something is safe to use, ask your health care provider.  · You may safely use  electric blankets, heating pads, computers, and microwave ovens.  · When using your cell phone, hold it to the ear opposite the pacemaker. Do not leave your cell phone in a pocket over the pacemaker.  · Keep all follow-up visits as directed by your health care provider. This is how your health care provider makes sure your chest is healing the way it should. Ask your health care provider when you should come back to have your stitches or staples taken out.  · Have your pacemaker checked every 3-6 months or as directed by your health care provider. Most pacemakers last for 4-8 years before a new one is needed.  SEEK MEDICAL CARE IF:  · You gain weight suddenly.  · Your legs or feet swell more than they have before.  · It feels like your heart is fluttering or skipping beats (heart palpitations).  · You have a fever.  SEEK IMMEDIATE MEDICAL CARE IF:  · You have chest pain.  · You feel more short of breath than you have felt before.  · You feel more light-headed than you have felt before.  · You have problems with your incision site, such as swelling or bleeding, or it starts to open up.  · You have drainage, redness, swelling, or pain at your incision site.     This information is not intended to replace advice given to you by your health care provider. Make sure you discuss any questions you have with your health care provider.     Document Released: 07/07/2006 Document Revised: 01/08/2016 Document Reviewed: 04/19/2013  [employer] Interactive Patient Education ©2016 [employer]    Depression / Suicide Risk    As you are discharged from this [facility] facility, it is important to learn how to keep safe from harming yourself.    Recognize the warning signs:  · Abrupt changes in personality, positive or negative- including increase in energy   · Giving away possessions  · Change in eating patterns- significant weight changes-  positive or negative  · Change in sleeping patterns- unable to sleep or sleeping all the  time   · Unwillingness or inability to communicate  · Depression  · Unusual sadness, discouragement and loneliness  · Talk of wanting to die  · Neglect of personal appearance   · Rebelliousness- reckless behavior  · Withdrawal from people/activities they love  · Confusion- inability to concentrate     If you or a loved one observes any of these behaviors or has concerns about self-harm, here's what you can do:  · Talk about it- your feelings and reasons for harming yourself  · Remove any means that you might use to hurt yourself (examples: pills, rope, extension cords, firearm)  · Get professional help from the community (Mental Health, Substance Abuse, psychological counseling)  · Do not be alone:Call your Safe Contact- someone whom you trust who will be there for you.  · Call your local CRISIS HOTLINE 750-2737 or 168-603-1769  · Call your local Children's Mobile Crisis Response Team [place] (856) 951-6555 or www.[employer]  · Call the toll free National Suicide Prevention Hotlines   · National Suicide Prevention Lifeline 652-390-UIKQ (1376)  · National Hope Line Network 800-SUICIDE (989-0790)

## 2019-07-19 NOTE — PROGRESS NOTES
REPORT ACCEPTED FROM Laura Choe R.N.: CARE ASSUMED  PLAN:  RESTRAINTS D/C'D @ 1000; D/C [facility] IN THE A.M.; COMPLETE 2 RN SKIN CHECK; FALL PRECAUTIONS

## 2019-07-19 NOTE — CARE PLAN
Problem: Discharge Barriers/Planning  Goal: Patient's continuum of care needs will be met  Outcome: PROGRESSING AS EXPECTED  PLAN : D/C IN A.M. TO [facility]; TEACHING REINFORCED, PATIENT [place] AND PLEASANTLY CONFUSED.

## 2019-07-19 NOTE — PROGRESS NOTES
Pt discharged to [facility] in stable condition. Report called to MARIBELL Moses and all questions answered. Script sent for seroquel. Dressings changed to chest pacemaker site, right arm skin tear and left arm skin tear. Plan for Follow up pace maker check July 30. All belongings left with patient. Transported via wheelchair with receiving facility escort.

## 2019-07-19 NOTE — PROGRESS NOTES
Assumed care of pt. @ 00:15, pt. Is comfortably asleep in bed. No restraints, safety precautions like bed alarm in place.

## 2019-07-28 NOTE — ADDENDUM NOTE
Encounter addended by: Rupa Espana M.D. on: 7/27/2019  7:38 PM<BR>    Actions taken: Delete clinical note

## 2019-07-30 ENCOUNTER — NON-PROVIDER VISIT (OUTPATIENT)
Dept: CARDIOLOGY | Facility: MEDICAL CENTER | Age: 84
End: 2019-07-30
Payer: MEDICARE

## 2019-07-30 ENCOUNTER — OFFICE VISIT (OUTPATIENT)
Dept: CARDIOLOGY | Facility: MEDICAL CENTER | Age: 84
End: 2019-07-30
Payer: MEDICARE

## 2019-07-30 VITALS — HEART RATE: 60 BPM | DIASTOLIC BLOOD PRESSURE: 70 MMHG | SYSTOLIC BLOOD PRESSURE: 138 MMHG | OXYGEN SATURATION: 93 %

## 2019-07-30 DIAGNOSIS — G30.1 LATE ONSET ALZHEIMER'S DISEASE WITH BEHAVIORAL DISTURBANCE (HCC): ICD-10-CM

## 2019-07-30 DIAGNOSIS — R53.1 GENERALIZED WEAKNESS: ICD-10-CM

## 2019-07-30 DIAGNOSIS — I44.2 AV BLOCK, COMPLETE (HCC): ICD-10-CM

## 2019-07-30 DIAGNOSIS — Z95.0 S/P PLACEMENT OF CARDIAC PACEMAKER: ICD-10-CM

## 2019-07-30 DIAGNOSIS — E03.9 HYPOTHYROIDISM, UNSPECIFIED TYPE: ICD-10-CM

## 2019-07-30 DIAGNOSIS — F02.818 LATE ONSET ALZHEIMER'S DISEASE WITH BEHAVIORAL DISTURBANCE (HCC): ICD-10-CM

## 2019-07-30 PROCEDURE — 93279 PRGRMG DEV EVAL PM/LDLS PM: CPT | Performed by: NURSE PRACTITIONER

## 2019-07-30 PROCEDURE — 99214 OFFICE O/P EST MOD 30 MIN: CPT | Mod: 25 | Performed by: NURSE PRACTITIONER

## 2019-07-30 RX ORDER — ALPRAZOLAM 0.25 MG/1
0.25 TABLET ORAL 3 TIMES DAILY
COMMUNITY

## 2019-07-30 RX ORDER — DIPHENHYDRAMINE HCL 25 MG
25 TABLET ORAL EVERY 6 HOURS PRN
COMMUNITY

## 2019-07-30 RX ORDER — IPRATROPIUM BROMIDE AND ALBUTEROL SULFATE 2.5; .5 MG/3ML; MG/3ML
SOLUTION RESPIRATORY (INHALATION)
Refills: 0 | COMMUNITY
Start: 2019-07-26

## 2019-07-30 ASSESSMENT — ENCOUNTER SYMPTOMS
NAUSEA: 0
HEADACHES: 0
ORTHOPNEA: 0
COUGH: 0
LOSS OF CONSCIOUSNESS: 0
CHILLS: 0
BRUISES/BLEEDS EASILY: 0
FALLS: 0
MEMORY LOSS: 1
FEVER: 0
DIZZINESS: 0
SHORTNESS OF BREATH: 0
ABDOMINAL PAIN: 0

## 2019-07-30 NOTE — PROGRESS NOTES
Chief Complaint   Patient presents with   • Hospital Follow-up   • Pacemaker Check/Dysfunction   • AV Block Complete   • Dementia       Subjective:   Charmaine Cordova is a 89 y.o. female who presents today for hospital follow-up for PM implantation.    Charmaine is a 89 year old female with history of COPD, hypothyroidism and dementia, who presented to [facility] after a ground level fall at her nursing home. She was transferred from [facility] after EKG showed second degree AV block. PM was recommended and implanted on 6/21/2019. She was also treated for a laceration to her head.    She is here today for follow-up, with two caretakers from her facility. She is non-communicative, and difficulty hearing. Caretakers state she is doing well: she does have a lot of itching, but not overt pain. No further falls or syncope. She is very sedentary; breathing is stable. No other history is able to be obtained.    Past Medical History:   Diagnosis Date   • Arrhythmia    • AV block, complete (HCC) 06/2019    Status post pacemaker placement.   • COPD (chronic obstructive pulmonary disease) (HCC)    • Dementia    • Disorder of thyroid    • Emphysema lung (HCC)    • Glaucoma      Past Surgical History:   Procedure Laterality Date   • PACEMAKER INSERTION Left 06/21/2019    [employer] MRI 1271 implanted by Dr. Phelps.   • GENERAL LUNG SURGERY  1972   • APPENDECTOMY     • HYSTERECTOMY LAPAROSCOPY     • THYROIDECTOMY       Family History   Problem Relation Age of Onset   • Diabetes Mother    • Lung Disease Father    • Diabetes Son    • Hypertension Son    • Paranoid behavior Son    • Diabetes Son    • Hypertension Son    • Diabetes Daughter    • Hypertension Daughter      Social History     Social History   • Marital status:      Spouse name: N/A   • Number of children: N/A   • Years of education: N/A     Occupational History   • Not on file.     Social History Main Topics   • Smoking status: Former Smoker      Start date: 6/20/1950     Quit date: 6/20/1972   • Smokeless tobacco: Never Used   • Alcohol use Yes   • Drug use: No   • Sexual activity: Not on file     Other Topics Concern   • Not on file     Social History Narrative   • No narrative on file     Allergies   Allergen Reactions   • Sulfa Drugs Itching     Outpatient Encounter Prescriptions as of 7/30/2019   Medication Sig Dispense Refill   • ALPRAZolam (XANAX) 0.25 MG Tab Take 0.25 mg by mouth 3 times a day.     • diphenhydrAMINE (BENADRYL) 25 MG Tab Take 25 mg by mouth every 6 hours as needed for Sleep.     • Docusate Calcium (STOOL SOFTENER PO) Take  by mouth.     • ipratropium-albuterol (DUONEB) 0.5-2.5 (3) MG/3ML nebulizer solution INHALE CONTENTS OF VIAL VIA NEBULIZER EVERY 6 HOURS FOR COPD  0   • traZODone (DESYREL) 50 MG Tab Take 1 Tab by mouth at bedtime as needed for Sleep. 30 Tab 3   • levothyroxine (SYNTHROID) 150 MCG Tab Take 1 Tab by mouth Every morning on an empty stomach. 30 Tab    • aspirin (ASA) 81 MG Chew Tab chewable tablet Take 81 mg by mouth every day.     • [DISCONTINUED] heparin 5000 UNIT/ML Solution Inject 1 mL as instructed every 8 hours.  0   • [DISCONTINUED] polyethylene glycol/lytes (MIRALAX) Pack Take 1 Packet by mouth 1 time daily as needed.  3   • [DISCONTINUED] citalopram (CELEXA) 20 MG Tab Take 20 mg by mouth every day.       No facility-administered encounter medications on file as of 7/30/2019.      Review of Systems   Constitutional: Negative for chills and fever.   HENT: Negative for congestion.    Respiratory: Negative for cough and shortness of breath.    Cardiovascular: Negative for orthopnea and leg swelling.   Gastrointestinal: Negative for abdominal pain and nausea.   Musculoskeletal: Negative for falls.   Skin: Positive for itching. Negative for rash.   Neurological: Negative for dizziness, loss of consciousness and headaches.   Endo/Heme/Allergies: Does not bruise/bleed easily.   Psychiatric/Behavioral: Positive for  memory loss.        Objective:   /70 (BP Location: Right arm, Patient Position: Sitting, BP Cuff Size: Adult)   Pulse 60   SpO2 93%     Physical Exam   Constitutional: She appears well-developed and well-nourished.   In a wheelchair, mumbling.   HENT:   Head: Normocephalic.   Eyes: EOM are normal.   Neck: Normal range of motion. Neck supple. No JVD present.   Cardiovascular: Normal rate, regular rhythm and normal heart sounds.    Pulmonary/Chest: Effort normal and breath sounds normal. No respiratory distress. She has no wheezes. She has no rales.   PM in left chest wall, well healed.   Musculoskeletal: She exhibits no edema.   Neurological: She is alert.   Skin: Skin is warm and dry. No rash noted.   Psychiatric: Cognition and memory are impaired. She is noncommunicative.   Not very communicative.     PM interrogation shows normal function. She is paced >99% of the time.    CONCLUSIONS OF ECHOCARDIOGRAM OF 6/21/2019:  No prior study is available for comparison.   Normal left ventricular systolic function.  Left ventricular ejection fraction is visually estimated to be 65%.  Mild aortic stenosis.  Vmax is 2.6 m/s. Transvalvular gradients are - Peak: 27 mmHg, Mean: 11   mmHg.  Mild tricuspid regurgitation.  Estimated right ventricular systolic pressure is 45 mmHg.    Lab Results   Component Value Date/Time    SODIUM 138 07/17/2019 04:00 AM    POTASSIUM 4.4 07/17/2019 04:00 AM    CHLORIDE 104 07/17/2019 04:00 AM    CO2 26 07/17/2019 04:00 AM    GLUCOSE 88 07/17/2019 04:00 AM    BUN 18 07/17/2019 04:00 AM    CREATININE 0.84 07/17/2019 04:00 AM    CREATININE 1.2 05/31/2005 10:52 AM     Lab Results   Component Value Date/Time    WBC 7.2 07/17/2019 04:00 AM    RBC 4.78 07/17/2019 04:00 AM    HEMOGLOBIN 14.6 07/17/2019 04:00 AM    HEMATOCRIT 44.6 07/17/2019 04:00 AM    MCV 93.3 07/17/2019 04:00 AM    MCH 30.5 07/17/2019 04:00 AM    MCHC 32.7 (L) 07/17/2019 04:00 AM    MPV 10.4 07/17/2019 04:00 AM        Assessment:      1. S/P placement of cardiac pacemaker     2. AV block, complete (HCC)     3. Generalized weakness     4. Late onset Alzheimer's disease with behavioral disturbance     5. Hypothyroidism, unspecified type         Medical Decision Making:  Today's Assessment / Status / Plan:     1. High AV block, status post single chamber pacemaker, which is working normally. RV output is lowered. Given she is more than one month post implantation, no further restrictions on her left arm.    2. Generalized weakness/dementia, in a wheelchair.    3. Hypothyroidism, treated with Synthroid.    Same medications for now. To FU in 3 months for next PM check in [place]; to follow-up sooner if clinical condition changes.    Collaborating MD: Bridget

## 2019-07-30 NOTE — LETTER
[facility]   [address]   [address]  [place] [address]  Phone: [phone]  Fax: [phone]              Charmaine Cordova  4/26/1925    Encounter Date: 7/30/2019    SALINAS Viramontes          PROGRESS NOTE:  Chief Complaint   Patient presents with   • Hospital Follow-up   • Pacemaker Check/Dysfunction   • AV Block Complete   • Dementia       Subjective:   Charmaine Cordova is a 89 y.o. female who presents today for hospital follow-up for PM implantation.    Charmaine is a 89 year old female with history of COPD, hypothyroidism and dementia, who presented to [facility] after a ground level fall at her nursing home. She was transferred from [facility] after EKG showed second degree AV block. PM was recommended and implanted on 6/21/2019. She was also treated for a laceration to her head.    She is here today for follow-up, with two caretakers from her facility. She is non-communicative, and difficulty hearing. Caretakers state she is doing well: she does have a lot of itching, but not overt pain. No further falls or syncope. She is very sedentary; breathing is stable. No other history is able to be obtained.    Past Medical History:   Diagnosis Date   • Arrhythmia    • AV block, complete (HCC) 06/2019    Status post pacemaker placement.   • COPD (chronic obstructive pulmonary disease) (HCC)    • Dementia    • Disorder of thyroid    • Emphysema lung (HCC)    • Glaucoma      Past Surgical History:   Procedure Laterality Date   • PACEMAKER INSERTION Left 06/21/2019    [employer] MRI 1271 implanted by Dr. Phelps.   • GENERAL LUNG SURGERY  1972   • APPENDECTOMY     • HYSTERECTOMY LAPAROSCOPY     • THYROIDECTOMY       Family History   Problem Relation Age of Onset   • Diabetes Mother    • Lung Disease Father    • Diabetes Son    • Hypertension Son    • Paranoid behavior Son    • Diabetes Son    • Hypertension Son    • Diabetes  Daughter    • Hypertension Daughter      Social History     Social History   • Marital status:      Spouse name: N/A   • Number of children: N/A   • Years of education: N/A     Occupational History   • Not on file.     Social History Main Topics   • Smoking status: Former Smoker     Start date: 6/20/1950     Quit date: 6/20/1972   • Smokeless tobacco: Never Used   • Alcohol use Yes   • Drug use: No   • Sexual activity: Not on file     Other Topics Concern   • Not on file     Social History Narrative   • No narrative on file     Allergies   Allergen Reactions   • Sulfa Drugs Itching     Outpatient Encounter Prescriptions as of 7/30/2019   Medication Sig Dispense Refill   • ALPRAZolam (XANAX) 0.25 MG Tab Take 0.25 mg by mouth 3 times a day.     • diphenhydrAMINE (BENADRYL) 25 MG Tab Take 25 mg by mouth every 6 hours as needed for Sleep.     • Docusate Calcium (STOOL SOFTENER PO) Take  by mouth.     • ipratropium-albuterol (DUONEB) 0.5-2.5 (3) MG/3ML nebulizer solution INHALE CONTENTS OF VIAL VIA NEBULIZER EVERY 6 HOURS FOR COPD  0   • traZODone (DESYREL) 50 MG Tab Take 1 Tab by mouth at bedtime as needed for Sleep. 30 Tab 3   • levothyroxine (SYNTHROID) 150 MCG Tab Take 1 Tab by mouth Every morning on an empty stomach. 30 Tab    • aspirin (ASA) 81 MG Chew Tab chewable tablet Take 81 mg by mouth every day.     • [DISCONTINUED] heparin 5000 UNIT/ML Solution Inject 1 mL as instructed every 8 hours.  0   • [DISCONTINUED] polyethylene glycol/lytes (MIRALAX) Pack Take 1 Packet by mouth 1 time daily as needed.  3   • [DISCONTINUED] citalopram (CELEXA) 20 MG Tab Take 20 mg by mouth every day.       No facility-administered encounter medications on file as of 7/30/2019.      Review of Systems   Constitutional: Negative for chills and fever.   HENT: Negative for congestion.    Respiratory: Negative for cough and shortness of breath.    Cardiovascular: Negative for orthopnea and leg swelling.   Gastrointestinal: Negative  for abdominal pain and nausea.   Musculoskeletal: Negative for falls.   Skin: Positive for itching. Negative for rash.   Neurological: Negative for dizziness, loss of consciousness and headaches.   Endo/Heme/Allergies: Does not bruise/bleed easily.   Psychiatric/Behavioral: Positive for memory loss.        Objective:   /70 (BP Location: Right arm, Patient Position: Sitting, BP Cuff Size: Adult)   Pulse 60   SpO2 93%     Physical Exam   Constitutional: She appears well-developed and well-nourished.   In a wheelchair, mumbling.   HENT:   Head: Normocephalic.   Eyes: EOM are normal.   Neck: Normal range of motion. Neck supple. No JVD present.   Cardiovascular: Normal rate, regular rhythm and normal heart sounds.    Pulmonary/Chest: Effort normal and breath sounds normal. No respiratory distress. She has no wheezes. She has no rales.   PM in left chest wall, well healed.   Musculoskeletal: She exhibits no edema.   Neurological: She is alert.   Skin: Skin is warm and dry. No rash noted.   Psychiatric: Cognition and memory are impaired. She is noncommunicative.   Not very communicative.     PM interrogation shows normal function. She is paced >99% of the time.    CONCLUSIONS OF ECHOCARDIOGRAM OF 6/21/2019:  No prior study is available for comparison.   Normal left ventricular systolic function.  Left ventricular ejection fraction is visually estimated to be 65%.  Mild aortic stenosis.  Vmax is 2.6 m/s. Transvalvular gradients are - Peak: 27 mmHg, Mean: 11   mmHg.  Mild tricuspid regurgitation.  Estimated right ventricular systolic pressure is 45 mmHg.    Lab Results   Component Value Date/Time    SODIUM 138 07/17/2019 04:00 AM    POTASSIUM 4.4 07/17/2019 04:00 AM    CHLORIDE 104 07/17/2019 04:00 AM    CO2 26 07/17/2019 04:00 AM    GLUCOSE 88 07/17/2019 04:00 AM    BUN 18 07/17/2019 04:00 AM    CREATININE 0.84 07/17/2019 04:00 AM    CREATININE 1.2 05/31/2005 10:52 AM     Lab Results   Component Value Date/Time     WBC 7.2 07/17/2019 04:00 AM    RBC 4.78 07/17/2019 04:00 AM    HEMOGLOBIN 14.6 07/17/2019 04:00 AM    HEMATOCRIT 44.6 07/17/2019 04:00 AM    MCV 93.3 07/17/2019 04:00 AM    MCH 30.5 07/17/2019 04:00 AM    MCHC 32.7 (L) 07/17/2019 04:00 AM    MPV 10.4 07/17/2019 04:00 AM        Assessment:     1. S/P placement of cardiac pacemaker     2. AV block, complete (HCC)     3. Generalized weakness     4. Late onset Alzheimer's disease with behavioral disturbance     5. Hypothyroidism, unspecified type         Medical Decision Making:  Today's Assessment / Status / Plan:     1. High AV block, status post single chamber pacemaker, which is working normally. RV output is lowered. Given she is more than one month post implantation, no further restrictions on her left arm.    2. Generalized weakness/dementia, in a wheelchair.    3. Hypothyroidism, treated with Synthroid.    Same medications for now. To FU in 3 months for next PM check in [place]; to follow-up sooner if clinical condition changes.    Collaborating MD: Bridget Rodriguez

## 2020-09-24 ENCOUNTER — NON-PROVIDER VISIT (OUTPATIENT)
Dept: CARDIOLOGY | Facility: PHYSICIAN GROUP | Age: 85
End: 2020-09-24
Payer: MEDICARE

## 2020-09-24 ENCOUNTER — OFFICE VISIT (OUTPATIENT)
Dept: CARDIOLOGY | Facility: PHYSICIAN GROUP | Age: 85
End: 2020-09-24
Payer: MEDICARE

## 2020-09-24 VITALS
HEART RATE: 65 BPM | BODY MASS INDEX: 21.46 KG/M2 | SYSTOLIC BLOOD PRESSURE: 110 MMHG | HEIGHT: 65 IN | DIASTOLIC BLOOD PRESSURE: 52 MMHG | OXYGEN SATURATION: 93 %

## 2020-09-24 VITALS
DIASTOLIC BLOOD PRESSURE: 52 MMHG | BODY MASS INDEX: 21.46 KG/M2 | SYSTOLIC BLOOD PRESSURE: 110 MMHG | HEART RATE: 65 BPM | OXYGEN SATURATION: 93 % | HEIGHT: 65 IN

## 2020-09-24 DIAGNOSIS — Z95.0 S/P PLACEMENT OF CARDIAC PACEMAKER: ICD-10-CM

## 2020-09-24 DIAGNOSIS — I44.2 AV BLOCK, COMPLETE (HCC): ICD-10-CM

## 2020-09-24 DIAGNOSIS — E03.9 HYPOTHYROIDISM, UNSPECIFIED TYPE: ICD-10-CM

## 2020-09-24 DIAGNOSIS — F02.818 LATE ONSET ALZHEIMER'S DISEASE WITH BEHAVIORAL DISTURBANCE (HCC): ICD-10-CM

## 2020-09-24 DIAGNOSIS — G30.1 LATE ONSET ALZHEIMER'S DISEASE WITH BEHAVIORAL DISTURBANCE (HCC): ICD-10-CM

## 2020-09-24 PROCEDURE — 93279 PRGRMG DEV EVAL PM/LDLS PM: CPT | Performed by: NURSE PRACTITIONER

## 2020-09-24 PROCEDURE — 99214 OFFICE O/P EST MOD 30 MIN: CPT | Performed by: NURSE PRACTITIONER

## 2020-09-24 RX ORDER — LEVOTHYROXINE SODIUM 88 UG/1
88 TABLET ORAL
COMMUNITY

## 2020-09-24 RX ORDER — SPIRONOLACTONE 25 MG/1
25 TABLET ORAL DAILY
COMMUNITY

## 2020-09-24 RX ORDER — DEXTROMETHORPHAN HBR, GUAIFENESIN 20; 400 MG/10ML; MG/10ML
SOLUTION ORAL
COMMUNITY

## 2020-09-24 RX ORDER — ACETAMINOPHEN 325 MG/1
650 TABLET ORAL EVERY 6 HOURS
COMMUNITY

## 2020-09-24 RX ORDER — CETIRIZINE HYDROCHLORIDE 10 MG/1
10 TABLET ORAL DAILY
COMMUNITY

## 2020-09-24 RX ORDER — MIRTAZAPINE 7.5 MG/1
7.5 TABLET, FILM COATED ORAL NIGHTLY
COMMUNITY

## 2020-09-24 RX ORDER — FUROSEMIDE 40 MG/1
40 TABLET ORAL DAILY
COMMUNITY

## 2020-09-24 RX ORDER — RISPERIDONE 0.25 MG/1
TABLET, ORALLY DISINTEGRATING ORAL EVERY EVENING
COMMUNITY

## 2020-09-24 RX ORDER — POLYETHYLENE GLYCOL 3350 17 G/17G
17 POWDER, FOR SOLUTION ORAL PRN
COMMUNITY

## 2020-09-24 RX ORDER — AMOXICILLIN 250 MG
1 CAPSULE ORAL DAILY
COMMUNITY

## 2020-09-24 ASSESSMENT — ENCOUNTER SYMPTOMS
FEVER: 0
FALLS: 0
CHILLS: 0
ABDOMINAL PAIN: 0
SHORTNESS OF BREATH: 0
LOSS OF CONSCIOUSNESS: 0
ORTHOPNEA: 0
HEADACHES: 0
DIZZINESS: 0
NAUSEA: 0
BRUISES/BLEEDS EASILY: 0
MEMORY LOSS: 1
COUGH: 0

## 2020-09-24 NOTE — PROGRESS NOTES
Chief Complaint   Patient presents with   • Follow-Up   • Pacemaker Check/Dysfunction   • AV Block Complete   • Dementia   • Hypothyroidism       Subjective:   Charmaine Cordova is a 89 y.o. female who presents today for overdue follow-up and PM check.     Charmaine is a 89 year old female with history of COPD, hypothyroidism and dementia, who had PM implanted in June 2019 after ground level fall due to high AV block.      She is here today for follow-up, with one caretaker from her facility. They do no have an up-to-date medication list today. She is non-communicative, and has difficulty hearing. Caretakers state she is doing well: she does have a lot of itching, but not overt pain. No further falls or syncope. She is very sedentary; breathing is stable. No fever. Appetite is fair. No other history is able to be obtained.    Past Medical History:   Diagnosis Date   • Arrhythmia    • AV block, complete (HCC) 06/2019    Status post pacemaker placement.   • COPD (chronic obstructive pulmonary disease) (HCC)    • Dementia (HCC)    • Disorder of thyroid    • Emphysema lung (HCC)    • Glaucoma      Past Surgical History:   Procedure Laterality Date   • PACEMAKER INSERTION Left 06/21/2019    [employer] MRI 1271 implanted by Dr. Phelps.   • GENERAL LUNG SURGERY  1972   • APPENDECTOMY     • HYSTERECTOMY LAPAROSCOPY     • THYROIDECTOMY       Family History   Problem Relation Age of Onset   • Diabetes Mother    • Lung Disease Father    • Diabetes Son    • Hypertension Son    • Paranoid behavior Son    • Diabetes Son    • Hypertension Son    • Diabetes Daughter    • Hypertension Daughter      Social History     Socioeconomic History   • Marital status:      Spouse name: Not on file   • Number of children: Not on file   • Years of education: Not on file   • Highest education level: Not on file   Occupational History   • Not on file   Social Needs   • Financial resource strain: Not on file   • Food insecurity      Worry: Not on file     Inability: Not on file   • Transportation needs     Medical: Not on file     Non-medical: Not on file   Tobacco Use   • Smoking status: Former Smoker     Start date: 1950     Quit date: 1972     Years since quittin.2   • Smokeless tobacco: Never Used   Substance and Sexual Activity   • Alcohol use: Yes   • Drug use: No   • Sexual activity: Not on file   Lifestyle   • Physical activity     Days per week: Not on file     Minutes per session: Not on file   • Stress: Not on file   Relationships   • Social connections     Talks on phone: Not on file     Gets together: Not on file     Attends [employer] service: Not on file     Active member of club or organization: Not on file     Attends meetings of clubs or organizations: Not on file     Relationship status: Not on file   • Intimate partner violence     Fear of current or ex partner: Not on file     Emotionally abused: Not on file     Physically abused: Not on file     Forced sexual activity: Not on file   Other Topics Concern   • Not on file   Social History Narrative   • Not on file     Allergies   Allergen Reactions   • Sulfa Drugs Itching     Outpatient Encounter Medications as of 2020   Medication Sig Dispense Refill   • ALPRAZolam (XANAX) 0.25 MG Tab Take 0.25 mg by mouth 3 times a day.     • diphenhydrAMINE (BENADRYL) 25 MG Tab Take 25 mg by mouth every 6 hours as needed for Sleep.     • Docusate Calcium (STOOL SOFTENER PO) Take  by mouth.     • ipratropium-albuterol (DUONEB) 0.5-2.5 (3) MG/3ML nebulizer solution INHALE CONTENTS OF VIAL VIA NEBULIZER EVERY 6 HOURS FOR COPD  0   • traZODone (DESYREL) 50 MG Tab Take 1 Tab by mouth at bedtime as needed for Sleep. 30 Tab 3   • levothyroxine (SYNTHROID) 150 MCG Tab Take 1 Tab by mouth Every morning on an empty stomach. 30 Tab    • aspirin (ASA) 81 MG Chew Tab chewable tablet Take 81 mg by mouth every day.       No facility-administered encounter medications on file as of  "9/24/2020.      Review of Systems   Constitutional: Negative for chills and fever.   HENT: Negative for congestion.    Respiratory: Negative for cough and shortness of breath.    Cardiovascular: Negative for orthopnea and leg swelling.   Gastrointestinal: Negative for abdominal pain and nausea.   Musculoskeletal: Negative for falls.   Skin: Positive for itching. Negative for rash.   Neurological: Negative for dizziness, loss of consciousness and headaches.   Endo/Heme/Allergies: Does not bruise/bleed easily.   Psychiatric/Behavioral: Positive for memory loss.        Objective:   /52 (BP Location: Left arm, Patient Position: Sitting, BP Cuff Size: Adult)   Pulse 65   Ht 1.651 m (5' 5\")   SpO2 93%   BMI 21.46 kg/m²     Physical Exam   Constitutional: She appears well-developed and well-nourished.   In a wheelchair, mumbling.  Asking to have her back scratched.  Thin   HENT:   Head: Normocephalic.   Eyes: EOM are normal.   Neck: Normal range of motion. Neck supple. No JVD present.   Cardiovascular: Normal rate, regular rhythm and normal heart sounds.   Pulmonary/Chest: Effort normal and breath sounds normal. No respiratory distress. She has no wheezes. She has no rales.   PM in left chest wall, well healed.   Musculoskeletal:         General: Edema present.      Comments: 1= edema to her shins bilaterally.   Neurological: She is alert.   Skin: Skin is warm and dry. No rash noted.   Psychiatric: Cognition and memory are impaired. She is noncommunicative.   Not very communicative.     PM is working normally. No ventricular high rate episodes. Battery longevity is 9.9-10.4 years.    Assessment:     1. S/P placement of cardiac pacemaker     2. AV block, complete (HCC)     3. Late onset Alzheimer's disease with behavioral disturbance (HCC)     4. Hypothyroidism, unspecified type         Medical Decision Making:  Today's Assessment / Status / Plan:     1. High AV block with PPM, which is working normally. She can " be followed annually by us.    2. Late onset dementia/Alzheimer's disease, progressive.    3. Hypothyroidism, treated.    4. COPD, treated, stable.    PM is working normally. She can be followed by us annually. Caretaker is asked to bring by up-to-date medication list for our records. FU sooner if clinical condition changes.
